# Patient Record
Sex: MALE | Race: WHITE | NOT HISPANIC OR LATINO | Employment: PART TIME | URBAN - METROPOLITAN AREA
[De-identification: names, ages, dates, MRNs, and addresses within clinical notes are randomized per-mention and may not be internally consistent; named-entity substitution may affect disease eponyms.]

---

## 2024-07-09 ENCOUNTER — LAB (OUTPATIENT)
Dept: LAB | Facility: CLINIC | Age: 49
End: 2024-07-09
Payer: COMMERCIAL

## 2024-07-09 ENCOUNTER — OFFICE VISIT (OUTPATIENT)
Dept: INTERNAL MEDICINE | Facility: CLINIC | Age: 49
End: 2024-07-09
Payer: COMMERCIAL

## 2024-07-09 VITALS
DIASTOLIC BLOOD PRESSURE: 85 MMHG | HEART RATE: 71 BPM | WEIGHT: 228.8 LBS | SYSTOLIC BLOOD PRESSURE: 124 MMHG | OXYGEN SATURATION: 98 %

## 2024-07-09 DIAGNOSIS — K57.32 DIVERTICULITIS OF COLON: ICD-10-CM

## 2024-07-09 DIAGNOSIS — K62.5 RECTAL BLEEDING: ICD-10-CM

## 2024-07-09 DIAGNOSIS — E78.5 HYPERLIPIDEMIA LDL GOAL <100: ICD-10-CM

## 2024-07-09 DIAGNOSIS — G57.81 NEUROPATHY OF RIGHT SURAL NERVE: ICD-10-CM

## 2024-07-09 DIAGNOSIS — Z11.4 SCREENING FOR HIV (HUMAN IMMUNODEFICIENCY VIRUS): ICD-10-CM

## 2024-07-09 DIAGNOSIS — L73.9 FOLLICULITIS: Primary | ICD-10-CM

## 2024-07-09 DIAGNOSIS — Z00.00 ENCOUNTER FOR HEALTH MAINTENANCE EXAMINATION IN ADULT: ICD-10-CM

## 2024-07-09 LAB
ANION GAP SERPL CALCULATED.3IONS-SCNC: 8 MMOL/L (ref 7–15)
BUN SERPL-MCNC: 11.9 MG/DL (ref 6–20)
CALCIUM SERPL-MCNC: 9.7 MG/DL (ref 8.6–10)
CHLORIDE SERPL-SCNC: 101 MMOL/L (ref 98–107)
CHOLEST SERPL-MCNC: 252 MG/DL
CREAT SERPL-MCNC: 0.93 MG/DL (ref 0.67–1.17)
CRP SERPL-MCNC: 7.72 MG/L
DEPRECATED HCO3 PLAS-SCNC: 29 MMOL/L (ref 22–29)
EGFRCR SERPLBLD CKD-EPI 2021: >90 ML/MIN/1.73M2
ERYTHROCYTE [DISTWIDTH] IN BLOOD BY AUTOMATED COUNT: 16.2 % (ref 10–15)
FASTING STATUS PATIENT QL REPORTED: YES
FASTING STATUS PATIENT QL REPORTED: YES
GLUCOSE SERPL-MCNC: 156 MG/DL (ref 70–99)
HCT VFR BLD AUTO: 52.4 % (ref 40–53)
HCV AB SERPL QL IA: NONREACTIVE
HDLC SERPL-MCNC: 42 MG/DL
HGB BLD-MCNC: 17 G/DL (ref 13.3–17.7)
HIV 1+2 AB+HIV1 P24 AG SERPL QL IA: NONREACTIVE
LDLC SERPL CALC-MCNC: 179 MG/DL
MCH RBC QN AUTO: 30.9 PG (ref 26.5–33)
MCHC RBC AUTO-ENTMCNC: 32.4 G/DL (ref 31.5–36.5)
MCV RBC AUTO: 95 FL (ref 78–100)
NONHDLC SERPL-MCNC: 210 MG/DL
PLATELET # BLD AUTO: 317 10E3/UL (ref 150–450)
POTASSIUM SERPL-SCNC: 5.5 MMOL/L (ref 3.4–5.3)
RBC # BLD AUTO: 5.5 10E6/UL (ref 4.4–5.9)
SODIUM SERPL-SCNC: 138 MMOL/L (ref 135–145)
TRIGL SERPL-MCNC: 157 MG/DL
TSH SERPL DL<=0.005 MIU/L-ACNC: 1.22 UIU/ML (ref 0.3–4.2)
WBC # BLD AUTO: 14.5 10E3/UL (ref 4–11)

## 2024-07-09 PROCEDURE — 85027 COMPLETE CBC AUTOMATED: CPT | Performed by: PATHOLOGY

## 2024-07-09 PROCEDURE — 90715 TDAP VACCINE 7 YRS/> IM: CPT

## 2024-07-09 PROCEDURE — 90480 ADMN SARSCOV2 VAC 1/ONLY CMP: CPT

## 2024-07-09 PROCEDURE — 90471 IMMUNIZATION ADMIN: CPT

## 2024-07-09 PROCEDURE — 84443 ASSAY THYROID STIM HORMONE: CPT | Performed by: PATHOLOGY

## 2024-07-09 PROCEDURE — 90746 HEPB VACCINE 3 DOSE ADULT IM: CPT

## 2024-07-09 PROCEDURE — 99204 OFFICE O/P NEW MOD 45 MIN: CPT | Mod: 25

## 2024-07-09 PROCEDURE — 87389 HIV-1 AG W/HIV-1&-2 AB AG IA: CPT | Performed by: INTERNAL MEDICINE

## 2024-07-09 PROCEDURE — 80048 BASIC METABOLIC PNL TOTAL CA: CPT | Performed by: PATHOLOGY

## 2024-07-09 PROCEDURE — 86803 HEPATITIS C AB TEST: CPT | Performed by: INTERNAL MEDICINE

## 2024-07-09 PROCEDURE — 80061 LIPID PANEL: CPT | Performed by: PATHOLOGY

## 2024-07-09 PROCEDURE — 91320 SARSCV2 VAC 30MCG TRS-SUC IM: CPT

## 2024-07-09 PROCEDURE — 99000 SPECIMEN HANDLING OFFICE-LAB: CPT | Performed by: PATHOLOGY

## 2024-07-09 PROCEDURE — 86140 C-REACTIVE PROTEIN: CPT | Performed by: PATHOLOGY

## 2024-07-09 PROCEDURE — 36415 COLL VENOUS BLD VENIPUNCTURE: CPT | Performed by: PATHOLOGY

## 2024-07-09 PROCEDURE — 90472 IMMUNIZATION ADMIN EACH ADD: CPT

## 2024-07-09 RX ORDER — KETOCONAZOLE 20 MG/ML
SHAMPOO TOPICAL DAILY PRN
Qty: 100 ML | Refills: 2 | Status: SHIPPED | OUTPATIENT
Start: 2024-07-09

## 2024-07-09 NOTE — PROGRESS NOTES
Assessment & Plan     Addendum:  Called pt back at 1055 following result of CBC with leukocytosis to 14.5, no diff was done so unclear if L shift. Slight hyperglycemia on labs to 157, but otherwise no indicators of acute infection on labs that have resulted thus far. Did add on a CRP to already drawn specimen but was unable to add on a Procalcitonin. Was able to speak to pt on phone and he is having no symptoms at this time. Given his hemodynamic stability in clinic today including normal HR and BP, lack of overt symptoms, and clinically benign exam, feel that it is appropriate to see patient in clinic next week at 0930. Through shared decision making he and I agreed to wait until next week for any appointment rather than going to the ED for further work up today.  However, I discussed with the pt that if he has a new fever, chills, acutely worsening abdominal pain or other new symptoms, he needs to go to the [facility] ED and have a repeat CT of his Abdomen done with c/f abscess vs other intraabdominal infection given recent h/o diverticulitis with some continued discomfort over the last few weeks. Otherwise, will see next week in clinic.    Jarod Quintero is a 47 yo male coming in to establish care and follow up from recent admission for first episode of diverticulitis. Additionally, discussed concerns for ~1 yr duration of R leg numbness and scalp pruritus. No acute concerns at this time. We addressed health maintenance to do list as below and pt is comfortable with completing ordered vaccines, labs, and referrals. Will see again in one month for general follow up.    (Z00.00) Encounter for medical examination to establish care  (primary encounter diagnosis)  Comment: Pt now with insurance and able to see a physician again. Notes several concerns as below, address HCM issues. Of note, currently is without a car but declined any resources or assistance with transportation.     (Z00.00) Encounter for health maintenance  examination in adult  Comment: Has not had basic labs drawn in several years. Agreeable to those ordered below, will see again in one month and can review together at this time. Will also receive vaccines as below.   Plan: CBC with platelets, Basic metabolic panel  (Ca,        Cl, CO2, Creat, Gluc, K, Na, BUN), TSH with         free T4 reflex, Lipid panel reflex to direct         LDL Non-fasting, Hepatitis C Screen Reflex to         HCV RNA Quant and Genotype, HEPATITIS B, ADULT         20+ (ENGERIX-B/RECOMBIVAX HB), TDAP 10-64Y         (ADACEL,BOOSTRIX), COVID-19 12+ (2023-24)         (PFIZER)    (Z11.4) Screening for HIV (human immunodeficiency virus)  Comment: Never done, pt agreeable to completing today. No known risk factors or exposures.   Plan: HIV Screening    (L73.9) Folliculitis  Comment: H/o of what sounds to be folliculitis of the scalp which has recurred. Ketoconazole has helped in the past and as OTC shampoos are not making a difference now, pt is requesting prescription for Ketoconazole shampoo again. Exam shows dry and flaking scalp c/w folliculitis vs dermatitis. No open wounds, erythema, etc c/f active infection.  Shampoo rx was sent and will check for improvement at follow up visit in one month.   Plan: ketoconazole (NIZORAL) 2 % external shampoo    (G57.81) Neuropathy of right sural nerve  Comment: Pt describes symptoms c/w sciatica vs R sural nerve neuropathy vs peroneal nerve as noted below. Given the distribution of sensory deficits on exam and his chronic low back pain described and paraspinal tenderness on exam. The numbness and burning pain, c/w neuropathy, are worse while he is working but alleviated by rest and not something he states he wants medications for at this time. Though he does cite some concerns for transportation for recurrent appointments, is very interested in PT referral to start strengthening and working on exercises to help with these symptoms. Referral therefore placed  "today. Do not feel that any imaging would change course of treatment at this time.   Follow up in one month on progression of symptoms.   Plan: Physical Therapy  Referral    (K62.5) Rectal bleeding  Comment: Describes intermittent hematochezia and BRBPR over the last few weeks. Was seen and admitted for his first episode of diverticulitis just prior to this (without perforation) and treated with Metronidazole and CTX with good relief of symptoms overall. Has never had a colonoscopy before and given that he is due for screening + severity of recent infection + intermittent BRBPR, agreed to go ahead with colonoscopy.  Follow up as able regarding this -- unsure if will be completed when I see him again in one month.   Plan: Adult GI  Referral - Procedure Only        Nicotine/Tobacco Cessation  He reports that he has been smoking cigarettes. He has never used smokeless tobacco.  Nicotine/Tobacco Cessation Plan  Information offered: Patient not interested at this time    Return in about 4 weeks (around 8/6/2024) for Follow up, with me.    Antonieta Blanco is a 48 year old, presenting for the following health issues:  Establish Care (Pt would like to establish care) and Numbness (Pt reports numbness on right knee that has persisted over the last year)    Pt prefers to go by \"Jarod.\"        7/9/2024     8:24 AM   Additional Questions   Roomed by BROOKE, EMT     ED follow up 5/01/2024  States that he was admitted for about three days and was told this was a \"very severe\" episode of diverticulitis given the severity of his symptoms and that he was \"close\" to \"having a rupture.\" Finished the CTX and Metronidazole. Has never had diverticulitis before. Was recommended to have a colonoscopy by the inpatient team and wanted to discuss this today, has never had one or a FIT test before.  Has seen bright red blood while wiping before and has seen it mixed into the stool as well. Has never had bety bleeding from " his rectum, no pain with bowel movements or rectal pain. Last week did see a less than tablespoons worth of blood while wiping and on his stool. Has also been having intermittent LLQ pain since the diverticulitis, but feels that this is improving. The pain is most noticeable before he has to have a BM. He feels that this discomfort is worse when he eats red meat.   No change in weight, no change in appetite. No night sweats, chills, fevers.   In terms of family history, states that his mother did have polyps removed but these were not cancerous. No family hx of colon cancer known otherwise.     R leg lateral numbness  Has noticed numbness across the lateral aspect of his R leg over the last year, just along the outside of his knee. The numbness is constant -- he has it right now. The numbness stays around the region of his knee, stopping mid-thigh superiorly and just below the knee inferiorly. 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 He is able to sit down and take breaks, but is overall fairly active at work. No radiation down to his toes. Does endorse lower back pain in the lumbar region.   No symptoms in the L leg.     Scalp concern  Was prescribed Ketoconazole in the past for scalp folliculitis. Has run out but notices that his scalp is very dry, flaking, and pruritic. This is patchy throughout his scalp. Has been using Head and Shoulders at home but feels that this is not helping. The ketoconazole helped the most.     ROS as per HPI.      Objective    /85 (BP Location: Right arm, Patient Position: Sitting, Cuff Size: Adult Regular)   Pulse 71   Wt 103.8 kg (228 lb 12.8 oz)   SpO2 98%   There is no height or weight on file to calculate BMI.  Physical Exam  Vitals and nursing note reviewed.   Constitutional:       Appearance: Normal appearance. He is obese. He is diaphoretic. He is not ill-appearing.       Comments: Smells of nicotine. Slightly diaphoretic.   HENT:      Head: Normocephalic.      Mouth/Throat:      Mouth: Mucous membranes are moist.   Eyes:      Extraocular Movements: Extraocular movements intact.      Conjunctiva/sclera: Conjunctivae normal.   Cardiovascular:      Rate and Rhythm: Normal rate and regular rhythm.      Pulses: Normal pulses.      Heart sounds: Normal heart sounds.   Pulmonary:      Effort: Pulmonary effort is normal. No respiratory distress.      Breath sounds: Normal breath sounds. No wheezing.   Abdominal:      General: Bowel sounds are normal. There is no distension.      Palpations: Abdomen is soft.      Tenderness: There is no abdominal tenderness. There is no guarding or rebound.      Comments: Obese   Musculoskeletal:         General: No swelling or tenderness. Normal range of motion.      Cervical back: Normal range of motion.      Right lower leg: No edema.      Left lower leg: No edema.   Skin:     General: Skin is warm.      Capillary Refill: Capillary refill takes 2 to 3 seconds.      Findings: No bruising, erythema or lesion.   Neurological:      Mental Status: He is oriented to person, place, and time. Mental status is at baseline.      Sensory: Sensory deficit (Sensory changes to temperature and pain along distribution of R sural nerve. Sensation intact to light touch. No deficits to other areas of leg, no deficits to L leg.) present.      Motor: No weakness.      Gait: Gait normal.      Deep Tendon Reflexes: Reflexes normal.        Nyla Pastor MD  Internal Medicine Resident, PGY1    Pt was seen and examined with Dr. Pastor.  I agree with his and her documentation as noted above.    My additional comments: None    Rhett Hidalgo MD

## 2024-07-12 ENCOUNTER — HOSPITAL ENCOUNTER (OUTPATIENT)
Facility: CLINIC | Age: 49
Setting detail: OBSERVATION
Discharge: HOME | End: 2024-07-13
Attending: EMERGENCY MEDICINE | Admitting: STUDENT IN AN ORGANIZED HEALTH CARE EDUCATION/TRAINING PROGRAM
Payer: COMMERCIAL

## 2024-07-12 ENCOUNTER — APPOINTMENT (OUTPATIENT)
Dept: CT IMAGING | Facility: CLINIC | Age: 49
End: 2024-07-12
Attending: EMERGENCY MEDICINE
Payer: COMMERCIAL

## 2024-07-12 DIAGNOSIS — R10.32 LEFT LOWER QUADRANT ABDOMINAL PAIN: ICD-10-CM

## 2024-07-12 DIAGNOSIS — K57.32 DIVERTICULITIS OF COLON: Primary | ICD-10-CM

## 2024-07-12 DIAGNOSIS — K52.9 ACUTE COLITIS: ICD-10-CM

## 2024-07-12 LAB
ALBUMIN SERPL BCG-MCNC: 4.2 G/DL (ref 3.5–5.2)
ALP SERPL-CCNC: 116 U/L (ref 40–150)
ALT SERPL W P-5'-P-CCNC: 11 U/L (ref 0–70)
ANION GAP SERPL CALCULATED.3IONS-SCNC: 9 MMOL/L (ref 7–15)
AST SERPL W P-5'-P-CCNC: 16 U/L (ref 0–45)
BASOPHILS # BLD AUTO: 0.1 10E3/UL (ref 0–0.2)
BASOPHILS NFR BLD AUTO: 1 %
BILIRUB SERPL-MCNC: 0.5 MG/DL
BUN SERPL-MCNC: 10.4 MG/DL (ref 6–20)
CALCIUM SERPL-MCNC: 9 MG/DL (ref 8.6–10)
CHLORIDE SERPL-SCNC: 102 MMOL/L (ref 98–107)
CREAT SERPL-MCNC: 0.84 MG/DL (ref 0.67–1.17)
CRP SERPL-MCNC: 7.76 MG/L
DEPRECATED HCO3 PLAS-SCNC: 27 MMOL/L (ref 22–29)
EGFRCR SERPLBLD CKD-EPI 2021: >90 ML/MIN/1.73M2
EOSINOPHIL # BLD AUTO: 0.6 10E3/UL (ref 0–0.7)
EOSINOPHIL NFR BLD AUTO: 6 %
ERYTHROCYTE [DISTWIDTH] IN BLOOD BY AUTOMATED COUNT: 15.9 % (ref 10–15)
GLUCOSE SERPL-MCNC: 110 MG/DL (ref 70–99)
HCT VFR BLD AUTO: 50.7 % (ref 40–53)
HGB BLD-MCNC: 16.3 G/DL (ref 13.3–17.7)
IMM GRANULOCYTES # BLD: 0 10E3/UL
IMM GRANULOCYTES NFR BLD: 0 %
LACTATE SERPL-SCNC: 1.5 MMOL/L (ref 0.7–2)
LYMPHOCYTES # BLD AUTO: 1.9 10E3/UL (ref 0.8–5.3)
LYMPHOCYTES NFR BLD AUTO: 22 %
MCH RBC QN AUTO: 30.9 PG (ref 26.5–33)
MCHC RBC AUTO-ENTMCNC: 32.1 G/DL (ref 31.5–36.5)
MCV RBC AUTO: 96 FL (ref 78–100)
MONOCYTES # BLD AUTO: 0.8 10E3/UL (ref 0–1.3)
MONOCYTES NFR BLD AUTO: 9 %
NEUTROPHILS # BLD AUTO: 5.4 10E3/UL (ref 1.6–8.3)
NEUTROPHILS NFR BLD AUTO: 62 %
NRBC # BLD AUTO: 0 10E3/UL
NRBC BLD AUTO-RTO: 0 /100
PLATELET # BLD AUTO: 319 10E3/UL (ref 150–450)
POTASSIUM SERPL-SCNC: 4.9 MMOL/L (ref 3.4–5.3)
PROCALCITONIN SERPL IA-MCNC: 0.05 NG/ML
PROT SERPL-MCNC: 7.4 G/DL (ref 6.4–8.3)
RADIOLOGIST FLAGS: NORMAL
RBC # BLD AUTO: 5.27 10E6/UL (ref 4.4–5.9)
SODIUM SERPL-SCNC: 138 MMOL/L (ref 135–145)
WBC # BLD AUTO: 8.8 10E3/UL (ref 4–11)

## 2024-07-12 PROCEDURE — 85025 COMPLETE CBC W/AUTO DIFF WBC: CPT | Performed by: EMERGENCY MEDICINE

## 2024-07-12 PROCEDURE — 74177 CT ABD & PELVIS W/CONTRAST: CPT

## 2024-07-12 PROCEDURE — 96374 THER/PROPH/DIAG INJ IV PUSH: CPT | Mod: 59

## 2024-07-12 PROCEDURE — 83605 ASSAY OF LACTIC ACID: CPT | Performed by: EMERGENCY MEDICINE

## 2024-07-12 PROCEDURE — 250N000011 HC RX IP 250 OP 636: Performed by: EMERGENCY MEDICINE

## 2024-07-12 PROCEDURE — G0378 HOSPITAL OBSERVATION PER HR: HCPCS

## 2024-07-12 PROCEDURE — 80053 COMPREHEN METABOLIC PANEL: CPT | Performed by: EMERGENCY MEDICINE

## 2024-07-12 PROCEDURE — 36415 COLL VENOUS BLD VENIPUNCTURE: CPT | Performed by: EMERGENCY MEDICINE

## 2024-07-12 PROCEDURE — 74177 CT ABD & PELVIS W/CONTRAST: CPT | Mod: 26 | Performed by: STUDENT IN AN ORGANIZED HEALTH CARE EDUCATION/TRAINING PROGRAM

## 2024-07-12 PROCEDURE — 96375 TX/PRO/DX INJ NEW DRUG ADDON: CPT

## 2024-07-12 PROCEDURE — 84145 PROCALCITONIN (PCT): CPT | Performed by: EMERGENCY MEDICINE

## 2024-07-12 PROCEDURE — 99285 EMERGENCY DEPT VISIT HI MDM: CPT | Performed by: EMERGENCY MEDICINE

## 2024-07-12 PROCEDURE — 86140 C-REACTIVE PROTEIN: CPT | Performed by: EMERGENCY MEDICINE

## 2024-07-12 PROCEDURE — 99285 EMERGENCY DEPT VISIT HI MDM: CPT | Mod: 25 | Performed by: EMERGENCY MEDICINE

## 2024-07-12 PROCEDURE — 99207 PR APP CREDIT; MD BILLING SHARED VISIT: CPT

## 2024-07-12 PROCEDURE — 99222 1ST HOSP IP/OBS MODERATE 55: CPT | Mod: FS | Performed by: STUDENT IN AN ORGANIZED HEALTH CARE EDUCATION/TRAINING PROGRAM

## 2024-07-12 RX ORDER — METRONIDAZOLE 500 MG/1
500 TABLET ORAL EVERY 8 HOURS
Status: DISCONTINUED | OUTPATIENT
Start: 2024-07-13 | End: 2024-07-13

## 2024-07-12 RX ORDER — CEFTRIAXONE 2 G/1
2 INJECTION, POWDER, FOR SOLUTION INTRAMUSCULAR; INTRAVENOUS ONCE
Status: COMPLETED | OUTPATIENT
Start: 2024-07-12 | End: 2024-07-12

## 2024-07-12 RX ORDER — ACETAMINOPHEN 650 MG/1
650 SUPPOSITORY RECTAL EVERY 4 HOURS PRN
Status: DISCONTINUED | OUTPATIENT
Start: 2024-07-12 | End: 2024-07-13 | Stop reason: HOSPADM

## 2024-07-12 RX ORDER — METRONIDAZOLE 500 MG/100ML
500 INJECTION, SOLUTION INTRAVENOUS ONCE
Status: COMPLETED | OUTPATIENT
Start: 2024-07-12 | End: 2024-07-12

## 2024-07-12 RX ORDER — IOPAMIDOL 755 MG/ML
135 INJECTION, SOLUTION INTRAVASCULAR ONCE
Status: COMPLETED | OUTPATIENT
Start: 2024-07-12 | End: 2024-07-12

## 2024-07-12 RX ORDER — CEFTRIAXONE 2 G/1
2 INJECTION, POWDER, FOR SOLUTION INTRAMUSCULAR; INTRAVENOUS EVERY 24 HOURS
Status: DISCONTINUED | OUTPATIENT
Start: 2024-07-13 | End: 2024-07-13

## 2024-07-12 RX ORDER — ACETAMINOPHEN 325 MG/1
650 TABLET ORAL EVERY 6 HOURS PRN
Status: DISCONTINUED | OUTPATIENT
Start: 2024-07-12 | End: 2024-07-13 | Stop reason: HOSPADM

## 2024-07-12 RX ADMIN — CEFTRIAXONE SODIUM 2 G: 2 INJECTION, POWDER, FOR SOLUTION INTRAMUSCULAR; INTRAVENOUS at 19:06

## 2024-07-12 RX ADMIN — METRONIDAZOLE 500 MG: 500 INJECTION, SOLUTION INTRAVENOUS at 21:32

## 2024-07-12 RX ADMIN — IOPAMIDOL 135 ML: 755 INJECTION, SOLUTION INTRAVENOUS at 14:39

## 2024-07-12 ASSESSMENT — ACTIVITIES OF DAILY LIVING (ADL)
ADLS_ACUITY_SCORE: 31
ADLS_ACUITY_SCORE: 35
ADLS_ACUITY_SCORE: 31
ADLS_ACUITY_SCORE: 35
ADLS_ACUITY_SCORE: 31
ADLS_ACUITY_SCORE: 35

## 2024-07-12 ASSESSMENT — COLUMBIA-SUICIDE SEVERITY RATING SCALE - C-SSRS
1. IN THE PAST MONTH, HAVE YOU WISHED YOU WERE DEAD OR WISHED YOU COULD GO TO SLEEP AND NOT WAKE UP?: NO
6. HAVE YOU EVER DONE ANYTHING, STARTED TO DO ANYTHING, OR PREPARED TO DO ANYTHING TO END YOUR LIFE?: NO
2. HAVE YOU ACTUALLY HAD ANY THOUGHTS OF KILLING YOURSELF IN THE PAST MONTH?: NO

## 2024-07-12 NOTE — H&P
[facility]    History and Physical - Hospitalist Service       Date of Admission:  7/12/2024    Assessment & Plan      Bella Quintero is a 48 year old male admitted on 7/12/2024. He has PMH of HLD, diverticulitis with perforation and peridiverticular phlegmon requiring admission in 5/2024. He was managed with CTX, Metronidazole and NPO diet with good response at that time. He was not able to complete outpatient colonoscopy after discharge.   He was seen by new PCP on 7/9 and complained of intermittent hematochezia for the last several weeks. Labs concerning at that time for leukocystosis of 14.5 and elevated CRP, though was asymptomatic and hemodynamically stable. Last night, he started having LLQ abdominal pain with BMs and slight pain sometimes where laying down similar to during previous diverticulitis episode which prompted ED presentation. Today labs are improved and CT with no sign of perforation, though does support diverticulitis/colitis.    Acute LLQ Abdominal Pain  Diverticulitis/Colitis  Hematochezia  Pt presents after abnormal labs a few days ago with worsening of LLQ pain last night, similar to previous diverticulitis episode from 5/24 which was associated with perforation.  He required admission at that time for IV antibiotics and improved.  Had not been able to obtain outpatient colonoscopy up to this point. CT with no sign of perforation, though does support diverticulitis/colitis with possible phlegmon. Abdominal exam reveals mild TTP in LUQ and LLQ, though no indication of acute abdomen.  - Given one dose of ceftriaxone and metronidazole in the ED, will continue   - Could consider consult to colorectal surgery vs GI in the AM  - Full liquid diet  - Hemoglobin currently stable, repeat in the morning     Nicotine Dependence, 1/2 ppd  No need for NRT at this time      Diet: NPO per Anesthesia Guidelines for Procedure/Surgery Except for: Meds    DVT  Prophylaxis: Low Risk/Ambulatory with no VTE prophylaxis indicated  Warren Catheter: Not present  Lines: None     Cardiac Monitoring: None  Code Status:  Full Code    Clinically Significant Risk Factors Present on Admission                                         Disposition Plan     Medically Ready for Discharge: Anticipated Tomorrow         The patient's care was discussed with the Patient.    Riana Quigley PA-C  Hospitalist Service  [facility]  Securely message with [employer] (more info)  Text page via [facility] Paging/Directory     ______________________________________________________________________    Chief Complaint   Abdominal pain    History is obtained from the patient    History of Present Illness   Bella Quintero is a 48 year old male who has PMH of HLD, diverticulitis with perforation and peridiverticular phlegmon requiring admission in 5/2024. He was managed with CTX, Metronidazole and NPO diet with good response at that time. He was not able to complete outpatient colonoscopy after discharge.     He reports having intermittent hematochezia since he was admitted with diverticulitis, would also have intermittent mild LLQ pain.  Has not felt quite back to normal.  He was seen by new PCP on 7/9 and complained of intermittent hematochezia for the last several weeks. Labs concerning at that time for leukocystosis of 14.5 and elevated CRP, though was asymptomatic and hemodynamically stable.     Last night, he started having LLQ abdominal pain with BMs and slight pain sometimes where laying down similar to during previous diverticulitis episode which prompted ED presentation. Today labs are improved and CT with no sign of perforation, though does support diverticulitis/colitis.    Denies N/V, reports constipation which he has not taken anything for at home.  Has been trying to do more of a liquid diet over the last several days to prevent any flaring, though pain  over the last 24 hours has increased.  He has pain 3-6 out of 10 in the LLQ mostly when he has a bowel movement, though he did notice that last night when he would turn in bed.  Denies fever/chills.    Past Medical History    Past Medical History:   Diagnosis Date    Bilateral low back pain with right-sided sciatica     Diverticulitis of colon 05/01/2024    History of alcohol use disorder     Tobacco use disorder        Past Surgical History   No past surgical history on file.    Prior to Admission Medications   Prior to Admission Medications   Prescriptions Last Dose Informant Patient Reported? Taking?   ketoconazole (NIZORAL) 2 % external shampoo   No No   Sig: Apply topically daily as needed for itching or irritation      Facility-Administered Medications: None        Review of Systems    The 10 point Review of Systems is negative other than noted in the HPI or here.      Physical Exam   Vital Signs: Temp: 98  F (36.7  C) Temp src: Oral BP: 125/70 Pulse: 75   Resp: 20 SpO2: 97 % O2 Device: None (Room air)    Weight: 0 lbs 0 oz    General Appearance: Pleasant, alert, just got to his room in obs  Respiratory: CTAB, no respiratory distress 8  Cardiovascular: RRR, no murmurs  GI: Soft, nondistended abdomen, mild TTP over RUQ, LUQ, LLQ, nonradiating.  No guarding or rebound tenderness    Medical Decision Making       75 MINUTES SPENT BY ME on the date of service doing chart review, history, exam, documentation & further activities per the note.      Data     I have personally reviewed the following data over the past 24 hrs:    8.8  \   16.3   / 319     138 102 10.4 /  110 (H)   4.9 27 0.84 \     ALT: 11 AST: 16 AP: 116 TBILI: 0.5   ALB: 4.2 TOT PROTEIN: 7.4 LIPASE: N/A     Procal: 0.05 CRP: 7.76 (H) Lactic Acid: 1.5         Imaging results reviewed over the past 24 hrs:   Recent Results (from the past 24 hour(s))   CT Abdomen Pelvis w Contrast   Result Value    Radiologist flags Colonoscopy post treatment     Narrative    EXAMINATION: CT ABDOMEN PELVIS W CONTRAST, 7/12/2024 3:04 PM    INDICATION: h/o ruptured diverticulitis w/persistent symptoms and  elevated WBC, r/o intraabdominal abscess    COMPARISON: None.    TECHNIQUE: CT scan of the abdomen and pelvis was performed on  multidetector CT scanner using volumetric acquisition technique and  images were reconstructed in multiple planes with variable thickness  and reviewed on dedicated workstations.     CONTRAST: Isovue 370.    FINDINGS:    Lower thorax: No effusion or consolidation.    Liver: No focal liver lesion. No intrahepatic biliary ductal dilation.       Biliary System: No calcified gallstone. No extrahepatic biliary ductal  dilation.    Pancreas: Within normal limits. No pancreatic ductal dilation.    Spleen: Borderline splenomegaly.    Adrenal glands: No nodule.    Kidneys: No obstructing calculus or hydronephrosis. Too small to  characterize left medial renal cortical hypodensity (6/77).    Pelvis: Urinary bladder is within normal limits.    Gastrointestinal tract: Mural thickening, eccentric appearing with  luminal narrowing and inner wall hyperenhancement, mild pericolonic  streakiness involving about 10 cm segment of sigmoid colon (4/357 and  6/57); ill marginated confluent density is seen along the right  lateral thickened sigmoid colon extending along lateral pelvic fascia  with adjacent fatty streakiness. No drainable fluid collection. Mild  thickening of the sigmoid mesocolon. Normal caliber small and large  bowel. Normal appendix.    Mesentery/peritoneum/retroperitoneum: No free air or fluid.    Lymph nodes: Prominent portacaval and shraddha hepatis lymph nodes,  measuring up to 1.2 and 0.9 cm (4/109, 4/123). Prominent pelvic lymph  node (4/273), possibly reactive.    Vasculature: Scattered atherosclerotic calcification of abdominal  aorta with no aneurysmal dilation.      Bones and soft tissues: Mild degenerative changes of the lumbosacral  spine.  Lucency seen along the anterior root of presumed L2 vertebral  body measuring about 12 mm (7/72), indeterminate.Small umbilical  hernia containing a portion of small bowel wall without evidence of  obstruction.        Impression    IMPRESSION:    1. Eccentric appearing mural thickening with luminal narrowing, in  metabolic enhancement and pericolonic streakiness involving 10 cm  segment of sigmoid colon, concerning for possible  colitis/diverticulitis with few adjacent prominent mesocolic nodes.  Recommend posttreatment colonoscopy to rule out underlying neoplastic  lesion  2. Confluent thickening right lateral to the thickened sigmoid colon  along the lateral pelvic fascia possibly phlegmon; no drainable fluid  collection.  3. Few prominent retroperitoneal lymph nodes, as above, consider  attention on follow-up.  4. Lucency along the anterosuperior L2 vertebral body, indeterminate,  possible demineralization recommend correlation with prior imaging if  available and/or attention on follow-up as clinically desired.  5. Borderline splenomegaly.      [Recommend Follow Up: Colonoscopy post treatment]    This report will be copied to the [facility] to ensure a  provider acknowledges the finding. [facility] Center is available Monday  through Friday 8am-3:30 pm.    I have personally reviewed the examination and initial interpretation  and I agree with the findings.    ANDI KISER MD         SYSTEM ID:  [id number]

## 2024-07-12 NOTE — ED PROVIDER NOTES
[place] EMERGENCY DEPARTMENT ([facility])    7/12/24       ED PROVIDER NOTE        History     Chief Complaint   Patient presents with    Abnormal Labs     HPI  Bella Quintero is a 48 year old male with a past medical history significant for perforated diverticulitis and HLD who presents to the Emergency Department for evaluation of abnormal labs and abdominal pain. Patient states he was admitted in May for three days for diverticulitis rupture and was discharged with antibiotics. He reports feeling fine since then and recently followed up on 7/9 to a PCP to establish care. He had labs drawn which were significant for leukocytosis to 14.5. Patient states his doctor called him about this and he had no symptoms at the time. He was told to return for another appointment next week for CT of abdomen and to present to ED if he has worsening symptoms. He states yesterday he began noticing 6/10 lower left abdominal pain with bowel movements which was similar to his pain when he had his diverticulitis. He only feels this pain with bowel movements and sometimes slightly when laying down. He denies blood in his stool. No recent nausea and vomiting. He believes he had a recent fever but did not measure this. He has not taken anything for the pain. No abdominal surgeries in the past. He reports feeling slightly bloated as well.       Past Medical History  Past Medical History:   Diagnosis Date    Bilateral low back pain with right-sided sciatica     Diverticulitis of colon 05/01/2024    History of alcohol use disorder     Tobacco use disorder      No past surgical history on file.  No current outpatient medications on file.    Allergies   Allergen Reactions    Codeine Swelling and Itching     PN: LW Reaction: EDEMA, GENERALIZED   Tolerated hydromorphone 5/1/24    Penicillins Other (See Comments)     Swelling, burning feeling in hands and feet. , PN: LW Reaction: EDEMA, GENERALIZED     Family History  Family  History   Problem Relation Age of Onset    Colon Polyps Mother     Alcoholism Father     Diabetes Father     Colon Cancer No family hx of      Social History   Social History     Tobacco Use    Smoking status: Every Day     Current packs/day: 1.00     Average packs/day: 1 pack/day for 30.0 years (30.0 ttl pk-yrs)     Types: Cigarettes     Start date: 7/9/1994    Smokeless tobacco: Never    Tobacco comments:     Revisit as pt is willing   Substance Use Topics    Alcohol use: Yes     Alcohol/week: 15.0 standard drinks of alcohol     Types: 15 Glasses of wine per week     Comment: 3 bottles of wine per week. H/o 1L EtOH every other day, self decreased several years ago.    Drug use: Yes     Types: Marijuana      Past medical history, past surgical history, medications, allergies, family history, and social history were reviewed with the patient. No additional pertinent items.     A medically appropriate review of systems was performed with pertinent positives and negatives noted in the HPI, and all other systems negative.    Physical Exam   BP: 125/70  Pulse: 75  Temp: 98  F (36.7  C)  Resp: 20  SpO2: 97 %  Physical Exam  Vitals and nursing note reviewed.   Constitutional:       General: He is not in acute distress.     Appearance: Normal appearance. He is not toxic-appearing.   HENT:      Head: Atraumatic.   Eyes:      General: No scleral icterus.     Conjunctiva/sclera: Conjunctivae normal.   Cardiovascular:      Rate and Rhythm: Normal rate and regular rhythm.      Heart sounds: Normal heart sounds.   Pulmonary:      Effort: Pulmonary effort is normal. No respiratory distress.      Breath sounds: Normal breath sounds.   Abdominal:      General: Bowel sounds are normal. There is no distension.      Palpations: Abdomen is soft.      Tenderness: There is abdominal tenderness in the left lower quadrant. There is no guarding or rebound.      Comments: Mild LLQ tenderness.   Musculoskeletal:         General: No deformity.       Cervical back: Neck supple.   Skin:     General: Skin is warm.   Neurological:      Mental Status: He is alert.         ED Course, Procedures, & Data      Procedures            Results for orders placed or performed during the hospital encounter of 07/12/24   CT Abdomen Pelvis w Contrast     Status: None   Result Value Ref Range    Radiologist flags Colonoscopy post treatment     Narrative    EXAMINATION: CT ABDOMEN PELVIS W CONTRAST, 7/12/2024 3:04 PM    INDICATION: h/o ruptured diverticulitis w/persistent symptoms and  elevated WBC, r/o intraabdominal abscess    COMPARISON: None.    TECHNIQUE: CT scan of the abdomen and pelvis was performed on  multidetector CT scanner using volumetric acquisition technique and  images were reconstructed in multiple planes with variable thickness  and reviewed on dedicated workstations.     CONTRAST: Isovue 370.    FINDINGS:    Lower thorax: No effusion or consolidation.    Liver: No focal liver lesion. No intrahepatic biliary ductal dilation.       Biliary System: No calcified gallstone. No extrahepatic biliary ductal  dilation.    Pancreas: Within normal limits. No pancreatic ductal dilation.    Spleen: Borderline splenomegaly.    Adrenal glands: No nodule.    Kidneys: No obstructing calculus or hydronephrosis. Too small to  characterize left medial renal cortical hypodensity (6/77).    Pelvis: Urinary bladder is within normal limits.    Gastrointestinal tract: Mural thickening, eccentric appearing with  luminal narrowing and inner wall hyperenhancement, mild pericolonic  streakiness involving about 10 cm segment of sigmoid colon (4/357 and  6/57); ill marginated confluent density is seen along the right  lateral thickened sigmoid colon extending along lateral pelvic fascia  with adjacent fatty streakiness. No drainable fluid collection. Mild  thickening of the sigmoid mesocolon. Normal caliber small and large  bowel. Normal  appendix.    Mesentery/peritoneum/retroperitoneum: No free air or fluid.    Lymph nodes: Prominent portacaval and shraddha hepatis lymph nodes,  measuring up to 1.2 and 0.9 cm (4/109, 4/123). Prominent pelvic lymph  node (4/273), possibly reactive.    Vasculature: Scattered atherosclerotic calcification of abdominal  aorta with no aneurysmal dilation.      Bones and soft tissues: Mild degenerative changes of the lumbosacral  spine. Lucency seen along the anterior root of presumed L2 vertebral  body measuring about 12 mm (7/72), indeterminate.Small umbilical  hernia containing a portion of small bowel wall without evidence of  obstruction.        Impression    IMPRESSION:    1. Eccentric appearing mural thickening with luminal narrowing, in  metabolic enhancement and pericolonic streakiness involving 10 cm  segment of sigmoid colon, concerning for possible  colitis/diverticulitis with few adjacent prominent mesocolic nodes.  Recommend posttreatment colonoscopy to rule out underlying neoplastic  lesion  2. Confluent thickening right lateral to the thickened sigmoid colon  along the lateral pelvic fascia possibly phlegmon; no drainable fluid  collection.  3. Few prominent retroperitoneal lymph nodes, as above, consider  attention on follow-up.  4. Lucency along the anterosuperior L2 vertebral body, indeterminate,  possible demineralization recommend correlation with prior imaging if  available and/or attention on follow-up as clinically desired.  5. Borderline splenomegaly.      [Recommend Follow Up: Colonoscopy post treatment]    This report will be copied to the [facility] to ensure a  provider acknowledges the finding. Access Center is available Monday  through Friday 8am-3:30 pm.    I have personally reviewed the examination and initial interpretation  and I agree with the findings.    ANDI KISER MD         SYSTEM ID:  [id number]   Comprehensive metabolic panel     Status: Abnormal   Result Value Ref  Range    Sodium 138 135 - 145 mmol/L    Potassium 4.9 3.4 - 5.3 mmol/L    Carbon Dioxide (CO2) 27 22 - 29 mmol/L    Anion Gap 9 7 - 15 mmol/L    Urea Nitrogen 10.4 6.0 - 20.0 mg/dL    Creatinine 0.84 0.67 - 1.17 mg/dL    GFR Estimate >90 >60 mL/min/1.73m2    Calcium 9.0 8.6 - 10.0 mg/dL    Chloride 102 98 - 107 mmol/L    Glucose 110 (H) 70 - 99 mg/dL    Alkaline Phosphatase 116 40 - 150 U/L    AST 16 0 - 45 U/L    ALT 11 0 - 70 U/L    Protein Total 7.4 6.4 - 8.3 g/dL    Albumin 4.2 3.5 - 5.2 g/dL    Bilirubin Total 0.5 <=1.2 mg/dL   Lactic acid whole blood with 1x repeat in 2 hr when >2     Status: Normal   Result Value Ref Range    Lactic Acid, Initial 1.5 0.7 - 2.0 mmol/L   Procalcitonin     Status: Normal   Result Value Ref Range    Procalcitonin 0.05 <0.50 ng/mL   CRP inflammation     Status: Abnormal   Result Value Ref Range    CRP Inflammation 7.76 (H) <5.00 mg/L   CBC with platelets and differential     Status: Abnormal   Result Value Ref Range    WBC Count 8.8 4.0 - 11.0 10e3/uL    RBC Count 5.27 4.40 - 5.90 10e6/uL    Hemoglobin 16.3 13.3 - 17.7 g/dL    Hematocrit 50.7 40.0 - 53.0 %    MCV 96 78 - 100 fL    MCH 30.9 26.5 - 33.0 pg    MCHC 32.1 31.5 - 36.5 g/dL    RDW 15.9 (H) 10.0 - 15.0 %    Platelet Count 319 150 - 450 10e3/uL    % Neutrophils 62 %    % Lymphocytes 22 %    % Monocytes 9 %    % Eosinophils 6 %    % Basophils 1 %    % Immature Granulocytes 0 %    NRBCs per 100 WBC 0 <1 /100    Absolute Neutrophils 5.4 1.6 - 8.3 10e3/uL    Absolute Lymphocytes 1.9 0.8 - 5.3 10e3/uL    Absolute Monocytes 0.8 0.0 - 1.3 10e3/uL    Absolute Eosinophils 0.6 0.0 - 0.7 10e3/uL    Absolute Basophils 0.1 0.0 - 0.2 10e3/uL    Absolute Immature Granulocytes 0.0 <=0.4 10e3/uL    Absolute NRBCs 0.0 10e3/uL   Basic metabolic panel     Status: Abnormal   Result Value Ref Range    Sodium 137 135 - 145 mmol/L    Potassium 4.2 3.4 - 5.3 mmol/L    Chloride 102 98 - 107 mmol/L    Carbon Dioxide (CO2) 26 22 - 29 mmol/L    Anion  Gap 9 7 - 15 mmol/L    Urea Nitrogen 9.6 6.0 - 20.0 mg/dL    Creatinine 0.78 0.67 - 1.17 mg/dL    GFR Estimate >90 >60 mL/min/1.73m2    Calcium 8.7 8.6 - 10.0 mg/dL    Glucose 111 (H) 70 - 99 mg/dL   CBC with platelets     Status: Abnormal   Result Value Ref Range    WBC Count 7.3 4.0 - 11.0 10e3/uL    RBC Count 4.93 4.40 - 5.90 10e6/uL    Hemoglobin 15.4 13.3 - 17.7 g/dL    Hematocrit 46.0 40.0 - 53.0 %    MCV 93 78 - 100 fL    MCH 31.2 26.5 - 33.0 pg    MCHC 33.5 31.5 - 36.5 g/dL    RDW 16.0 (H) 10.0 - 15.0 %    Platelet Count 275 150 - 450 10e3/uL   CBC with platelets differential     Status: Abnormal    Narrative    The following orders were created for panel order CBC with platelets differential.  Procedure                               Abnormality         Status                     ---------                               -----------         ------                     CBC with platelets and d...[[id number]]  Abnormal            Final result                 Please view results for these tests on the individual orders.     Medications   acetaminophen (TYLENOL) tablet 650 mg (has no administration in time range)     Or   acetaminophen (TYLENOL) Suppository 650 mg (has no administration in time range)   melatonin tablet 5 mg (has no administration in time range)   sodium chloride (PF) 0.9% PF flush 84 mL (84 mLs Intravenous $Given 7/12/24 1439)   iopamidol (ISOVUE-370) solution 135 mL (135 mLs Intravenous $Given 7/12/24 1439)   cefTRIAXone (ROCEPHIN) 2 g vial to attach to  ml bag for ADULTS or NS 50 ml bag for PEDS (2 g Intravenous $New Bag 7/12/24 1906)   metroNIDAZOLE (FLAGYL) infusion 500 mg (500 mg Intravenous $New Bag 7/12/24 2132)     Labs Ordered and Resulted from Time of ED Arrival to Time of ED Departure   COMPREHENSIVE METABOLIC PANEL - Abnormal       Result Value    Sodium 138      Potassium 4.9      Carbon Dioxide (CO2) 27      Anion Gap 9      Urea Nitrogen 10.4      Creatinine 0.84      GFR  Estimate >90      Calcium 9.0      Chloride 102      Glucose 110 (*)     Alkaline Phosphatase 116      AST 16      ALT 11      Protein Total 7.4      Albumin 4.2      Bilirubin Total 0.5     CRP INFLAMMATION - Abnormal    CRP Inflammation 7.76 (*)    CBC WITH PLATELETS AND DIFFERENTIAL - Abnormal    WBC Count 8.8      RBC Count 5.27      Hemoglobin 16.3      Hematocrit 50.7      MCV 96      MCH 30.9      MCHC 32.1      RDW 15.9 (*)     Platelet Count 319      % Neutrophils 62      % Lymphocytes 22      % Monocytes 9      % Eosinophils 6      % Basophils 1      % Immature Granulocytes 0      NRBCs per 100 WBC 0      Absolute Neutrophils 5.4      Absolute Lymphocytes 1.9      Absolute Monocytes 0.8      Absolute Eosinophils 0.6      Absolute Basophils 0.1      Absolute Immature Granulocytes 0.0      Absolute NRBCs 0.0     LACTIC ACID WHOLE BLOOD WITH 1X REPEAT IN 2 HR WHEN >2 - Normal    Lactic Acid, Initial 1.5     PROCALCITONIN - Normal    Procalcitonin 0.05       CT Abdomen Pelvis w Contrast   Final Result   IMPRESSION:      1. Eccentric appearing mural thickening with luminal narrowing, in   metabolic enhancement and pericolonic streakiness involving 10 cm   segment of sigmoid colon, concerning for possible   colitis/diverticulitis with few adjacent prominent mesocolic nodes.   Recommend posttreatment colonoscopy to rule out underlying neoplastic   lesion   2. Confluent thickening right lateral to the thickened sigmoid colon   along the lateral pelvic fascia possibly phlegmon; no drainable fluid   collection.   3. Few prominent retroperitoneal lymph nodes, as above, consider   attention on follow-up.   4. Lucency along the anterosuperior L2 vertebral body, indeterminate,   possible demineralization recommend correlation with prior imaging if   available and/or attention on follow-up as clinically desired.   5. Borderline splenomegaly.         [Recommend Follow Up: Colonoscopy post treatment]      This report will  be copied to the [place] to ensure a   provider acknowledges the finding. Access Center is available Monday   through Friday 8am-3:30 pm.      I have personally reviewed the examination and initial interpretation   and I agree with the findings.      ANDI KISER MD            SYSTEM ID:  [id number]             Critical care was not performed.     Medical Decision Making  The patient's presentation was of moderate complexity (an acute complicated injury).    The patient's evaluation involved:  ordering and/or review of 3+ test(s) in this encounter (see separate area of note for details)    The patient's management necessitated high risk (a decision regarding hospitalization).    Assessment & Plan    49 yo male here with LLQ abd pain s/p ruptured diverticulitis 6 wks prior treated conservatively with abx. He now has pain with bowel movements as well. Labs and CT ordered per recommendation of primary and exam and history.    Labs show CRP elevated at 7.76, otherwise unremarkable with normal WBC and lactate.  CT abd/pelvis shows:  1. Eccentric appearing mural thickening with luminal narrowing, in  metabolic enhancement and pericolonic streakiness involving 10 cm  segment of sigmoid colon, concerning for possible  colitis/diverticulitis with few adjacent prominent mesocolic nodes.  Recommend posttreatment colonoscopy to rule out underlying neoplastic  lesion  2. Confluent thickening right lateral to the thickened sigmoid colon  along the lateral pelvic fascia possibly phlegmon; no drainable fluid  collection.  3. Few prominent retroperitoneal lymph nodes, as above, consider  attention on follow-up.  4. Lucency along the anterosuperior L2 vertebral body, indeterminate,  possible demineralization recommend correlation with prior imaging if  available and/or attention on follow-up as clinically desired.  5. Borderline splenomegaly.    He will be admitted to medicine on observation for treatment of his  colitis and will likely be discharged with outpatient follow up for colonoscopy.    I have reviewed the nursing notes. I have reviewed the findings, diagnosis, plan and need for follow up with the patient.    Current Discharge Medication List          Final diagnoses:   Acute colitis   Left lower quadrant abdominal pain   IRIP, am serving as a trained medical scribe to document services personally performed by Jose Kaiser MD, based on the provider's statements to me.      I, Jose Kaiser MD, was physically present and have reviewed and verified the accuracy of this note documented by RIP BURLESON.     Jose Kaiser MD  [facility] EMERGENCY DEPARTMENT  7/12/2024        Jose Kaiser MD  07/13/24 1017

## 2024-07-12 NOTE — ED TRIAGE NOTES
Triage Assessment (Adult)       Row Name 07/12/24 1207          Triage Assessment    Airway WDL WDL        Respiratory WDL    Respiratory WDL WDL        Skin Circulation/Temperature WDL    Skin Circulation/Temperature WDL WDL        Cardiac WDL    Cardiac WDL WDL        Peripheral/Neurovascular WDL    Peripheral Neurovascular WDL WDL        Cognitive/Neuro/Behavioral WDL    Cognitive/Neuro/Behavioral WDL WDL

## 2024-07-12 NOTE — ED TRIAGE NOTES
Pt arrives ambulatory to triage w/ c/o abnormal labs, abd pain. Pt states recent hospitalization for diverticulitis w/ perf- follow up outpatient appt labs shown to be abnormal. Notably, pt states K+ and WBC high. Abd pain LLQ, happening prior to and while having bm.

## 2024-07-13 VITALS
SYSTOLIC BLOOD PRESSURE: 107 MMHG | DIASTOLIC BLOOD PRESSURE: 64 MMHG | TEMPERATURE: 98.1 F | HEART RATE: 60 BPM | OXYGEN SATURATION: 95 % | RESPIRATION RATE: 18 BRPM

## 2024-07-13 LAB
ANION GAP SERPL CALCULATED.3IONS-SCNC: 9 MMOL/L (ref 7–15)
BUN SERPL-MCNC: 9.6 MG/DL (ref 6–20)
CALCIUM SERPL-MCNC: 8.7 MG/DL (ref 8.6–10)
CHLORIDE SERPL-SCNC: 102 MMOL/L (ref 98–107)
CREAT SERPL-MCNC: 0.78 MG/DL (ref 0.67–1.17)
DEPRECATED HCO3 PLAS-SCNC: 26 MMOL/L (ref 22–29)
EGFRCR SERPLBLD CKD-EPI 2021: >90 ML/MIN/1.73M2
ERYTHROCYTE [DISTWIDTH] IN BLOOD BY AUTOMATED COUNT: 16 % (ref 10–15)
GLUCOSE SERPL-MCNC: 111 MG/DL (ref 70–99)
HCT VFR BLD AUTO: 46 % (ref 40–53)
HGB BLD-MCNC: 15.4 G/DL (ref 13.3–17.7)
MCH RBC QN AUTO: 31.2 PG (ref 26.5–33)
MCHC RBC AUTO-ENTMCNC: 33.5 G/DL (ref 31.5–36.5)
MCV RBC AUTO: 93 FL (ref 78–100)
PLATELET # BLD AUTO: 275 10E3/UL (ref 150–450)
POTASSIUM SERPL-SCNC: 4.2 MMOL/L (ref 3.4–5.3)
RBC # BLD AUTO: 4.93 10E6/UL (ref 4.4–5.9)
SODIUM SERPL-SCNC: 137 MMOL/L (ref 135–145)
WBC # BLD AUTO: 7.3 10E3/UL (ref 4–11)

## 2024-07-13 PROCEDURE — 250N000013 HC RX MED GY IP 250 OP 250 PS 637

## 2024-07-13 PROCEDURE — 85027 COMPLETE CBC AUTOMATED: CPT

## 2024-07-13 PROCEDURE — 250N000011 HC RX IP 250 OP 636

## 2024-07-13 PROCEDURE — 82374 ASSAY BLOOD CARBON DIOXIDE: CPT

## 2024-07-13 PROCEDURE — 99239 HOSP IP/OBS DSCHRG MGMT >30: CPT | Mod: FS | Performed by: PHYSICIAN ASSISTANT

## 2024-07-13 PROCEDURE — 36415 COLL VENOUS BLD VENIPUNCTURE: CPT

## 2024-07-13 PROCEDURE — G0378 HOSPITAL OBSERVATION PER HR: HCPCS

## 2024-07-13 PROCEDURE — 96376 TX/PRO/DX INJ SAME DRUG ADON: CPT

## 2024-07-13 PROCEDURE — 99207 PR APP CREDIT; MD BILLING SHARED VISIT: CPT | Mod: FS | Performed by: STUDENT IN AN ORGANIZED HEALTH CARE EDUCATION/TRAINING PROGRAM

## 2024-07-13 RX ADMIN — METRONIDAZOLE 500 MG: 500 TABLET ORAL at 05:41

## 2024-07-13 RX ADMIN — CEFTRIAXONE SODIUM 2 G: 2 INJECTION, POWDER, FOR SOLUTION INTRAMUSCULAR; INTRAVENOUS at 05:41

## 2024-07-13 ASSESSMENT — ACTIVITIES OF DAILY LIVING (ADL)
ADLS_ACUITY_SCORE: 31

## 2024-07-13 NOTE — PROGRESS NOTES
Patient discharged from the hospital to home at 1330, transportation (cab) arranged at 1345. PIV removed, discharge instructions reviewed and given to pt. Pt to  his discharge prescription from 2nd floor pharmacy.    The patient does have evidence of prediabetes on her current lab work. Her blood sugar and hemoglobin A1c have not improved despite diet and exercise. We will start her on the once daily metformin as indicated which may also help with weight loss. We will monitor her closely. We will see her back as scheduled and she will get a CMP and hemoglobin A1c prior to that visit.

## 2024-07-13 NOTE — DISCHARGE SUMMARY
[facility]  Hospitalist Discharge Summary      Date of Admission:  7/12/2024  Date of Discharge:  7/13/2024  Discharging Provider: Manuela Villeda PA-C  Discharge Service: Hospitalist Service    Discharge Diagnoses   Acute abdominal pain  Diverticulitis  Hematochezia    Clinically Significant Risk Factors          Follow-ups Needed After Discharge   Follow up with primary care provider as scheduled next week.     Needs colonoscopy after completion of antibiotics      Discharge Disposition   Discharged to home  Condition at discharge: Stable    Hospital Course   Bella Quintero is a 48 year old man with a history of tobacco use, dyslipidemia, and recent admission in May for diverticulitis c/b perforation and peridiverticular phlegmon. He was now admitted to Medicine Observation with acute onset LLQ pain and hematochezia consistent with recurrent bout of diverticulitis. CT demonstrated eccentric mural thickening with luminal narrowing involving 10 cm segment of sigmoid colon, few adjacent prominent mesocolic nodes, confluence thickening lateral to thickened sigmoid colon with no drainable fluid collection. Pain resolved the following morning and he was tolerating a regular diet. Started on IV antibiotics and discharged on Augmentin to complete a 10-day course.     He will need a colonoscopy after antibiotic course completed. He already has a referral from his PCP. Has PCP follow-up scheduled on 7/16.     Consultations This Hospital Stay   None    Code Status   Full Code    Time Spent on this Encounter   I, Manuela Villeda PA-C, personally saw the patient today and spent greater than 30 minutes discharging this patient.       DAMION Brady [facility] [address] OBSERVATION  [address] [address]  Phone: [phone]  Fax:  [phone]  ______________________________________________________________________    Physical Exam   Vital Signs: Temp: 98.3  F (36.8  C) Temp src: Oral BP: 97/64 Pulse: 57   Resp: 18 SpO2: 93 % O2 Device: None (Room air)    Weight: 0 lbs 0 oz  Constitutional: Awake and alert, in no apparent distress.   Eyes: Sclera clear, anicteric   Respiratory: Breathing non-labored. CTAB  Cardiovascular:  RRR, normal S1/S2. No rubs or murmurs. No peripheral edema.   GI: Soft, non-tender, non-distended. Normoactive bowel sounds.   Skin:  Good color. No jaundice. No visible rashes, lesions, or bruising of concern.   Neurologic: Alert and oriented.       Primary Care Physician   Nyla Pastor    Discharge Orders      Reason for your hospital stay    Dear Bella Quintero,    You were hospitalized at [facility] with abdominal pain caused by a recurrent bout of diverticulitis. Your CT scan shows inflammatory changes but no abscess. You will need to complete a course of antibiotics. After your antibiotics are done you need to have a colonoscopy to make sure the inflammation is gone and to rule out any possibility of cancer.  You should continue to improve but if you develop fever, shortness of breath, light headedness, chest pain or otherwise worsen please seek medical attention.      Take care!    Manuela Villeda PA-C   Hospitalist Service     Activity    Your activity upon discharge: activity as tolerated     Adult [facility] Follow-up and recommended labs and tests    Follow up with primary care provider as scheduled next week.     Please have a colonoscopy done at the next available appointment    Appointments on [place] and/or [facility] (with [facility] or [facility] provider or service). Call [phone] if you haven't heard regarding these appointments within 7 days of discharge.     Diet    Follow this diet upon discharge: Continue a regular diet. Work on increasing fiber in your diet and  continue with the positive changes you have made already.       Significant Results and Procedures   ROUTINE IP LABS (Last four results)  Recent Labs   Lab 07/13/24  0536 07/12/24  1236 07/09/24  0948    138 138   POTASSIUM 4.2 4.9 5.5*   CHLORIDE 102 102 101   CO2 26 27 29   ANIONGAP 9 9 8   * 110* 156*   BUN 9.6 10.4 11.9   CR 0.78 0.84 0.93   NORMA 8.7 9.0 9.7   PROTTOTAL  --  7.4  --    ALBUMIN  --  4.2  --    BILITOTAL  --  0.5  --    ALKPHOS  --  116  --    AST  --  16  --    ALT  --  11  --      Recent Labs   Lab 07/13/24 0536 07/12/24  1236 07/09/24  0948   WBC 7.3 8.8 14.5*   RBC 4.93 5.27 5.50   HGB 15.4 16.3 17.0   HCT 46.0 50.7 52.4   MCV 93 96 95   MCH 31.2 30.9 30.9   MCHC 33.5 32.1 32.4   RDW 16.0* 15.9* 16.2*    319 317     No lab results found in last 7 days.         Discharge Medications   Current Discharge Medication List        START taking these medications    Details   amoxicillin-clavulanate (AUGMENTIN) 875-125 MG tablet Take 1 tablet by mouth 2 times daily  Qty: 19 tablet, Refills: 0    Associated Diagnoses: Diverticulitis of colon           CONTINUE these medications which have NOT CHANGED    Details   ketoconazole (NIZORAL) 2 % external shampoo Apply topically daily as needed for itching or irritation  Qty: 100 mL, Refills: 2    Associated Diagnoses: Folliculitis           Allergies   Allergies   Allergen Reactions    Codeine Swelling and Itching     PN: LW Reaction: EDEMA, GENERALIZED   Tolerated hydromorphone 5/1/24    Penicillins Other (See Comments)     Swelling, burning feeling in hands and feet. , PN: LW Reaction: EDEMA, GENERALIZED

## 2024-07-13 NOTE — PROGRESS NOTES
Goal Outcome Evaluation:    -diagnostic tests and consults completed and resulted Not met  -vital signs normal or at patient baseline Met  -tolerating oral intake to maintain hydration Met  -adequate pain control on oral analgesics Met- Pt denies pain  -tolerating oral antibiotics or has plans for home infusion setup Not met  -returns to baseline functional status Not met  -safe disposition plan has been identified Not met

## 2024-07-13 NOTE — PROGRESS NOTES
Care Management Discharge Note    Discharge Date: 07/13/2024     Discharge Disposition: Home    Discharge Services: None    Discharge DME: None    Discharge Transportation: Taxi    Additional Information:  This writer received a message from bedside RN that pt is medically ready for discharge home today but needs help with arranging a ride. Pt typically uses [employer] but they are closed on the weekend. Pt states he does not have the ability to pay for a ride home. Per bedside RN, pt is independent and safe to take a standard cab ride. Bedside RN confirmed home address and pt's cell phone number.    [employer]  Phone: [phone]  Cab ride arranged to home. Bedside RN aware.     CC will continue to monitor patient's medical condition and progress towards discharge.  Madeline Fernando RN BSN  6C Unit Care Coordinator  Phone number: [phone]  Pager: [phone]

## 2024-07-13 NOTE — PROGRESS NOTES
Goal Outcome Evaluation:     -diagnostic tests and consults completed and resulted: Met  -vital signs normal or at patient baseline Met  -tolerating oral intake to maintain hydration Met  -adequate pain control on oral analgesics Met- Pt denies pain  -tolerating oral antibiotics or has plans for home infusion setup: Met  -returns to baseline functional status: Met  -safe disposition plan has been identified: Met: discharging to home today once transportation is arranged.

## 2024-07-15 ENCOUNTER — PATIENT OUTREACH (OUTPATIENT)
Dept: CARE COORDINATION | Facility: CLINIC | Age: 49
End: 2024-07-15
Payer: COMMERCIAL

## 2024-07-15 NOTE — PROGRESS NOTES
Clinic Care Coordination Contact  Transitions of Care Outreach    Chief Complaint   Patient presents with    Clinic Care Coordination - Post Hospital       Most Recent Admission Date: 7/12/2024   Most Recent Admission Diagnosis: Acute colitis - K52.9  Left lower quadrant abdominal pain - R10.32     Most Recent Discharge Date: 7/13/2024   Most Recent Discharge Diagnosis: Acute colitis - K52.9  Left lower quadrant abdominal pain - R10.32  Diverticulitis of colon - K57.32     Transitions of Care Assessment    Discharge Assessment  How are you doing now that you are home?: RN called and spoke with patient. Pt states that he is doing alright. Pt has all of his medications, declines further care coordination needs. Pt has appt scheduled with PCP tomorrow and RN reviewed with patient- no concerns.  How are your symptoms? (Red Flag symptoms escalate to triage hotline per guidelines): Improved  Do you know how to contact your clinic care team if you have future questions or changes to your health status? : Yes  Does the patient have their discharge instructions? : Yes  Does the patient have questions regarding their discharge instructions? : No  Were you started on any new medications or were there changes to any of your previous medications? : Yes  Does the patient have all of their medications?: Yes  Do you have questions regarding any of your medications? : No  Do you have all of your needed medical supplies or equipment (DME)?  (i.e. oxygen tank, CPAP, cane, etc.): Yes                Follow up Plan     Discharge Follow-Up  Discharge follow up appointment scheduled in alignment with recommended follow up timeframe or Transitions of Risk Category? (Low = within 30 days; Moderate= within 14 days; High= within 7 days): Yes  Discharge Follow Up Appointment Date: 07/16/24  Discharge Follow Up Appointment Scheduled with?: Primary Care Provider    Future Appointments   Date Time Provider Department Center   7/16/2024  9:30 AM  Nyla Pastor MD [facility]       Outpatient Plan as outlined on AVS reviewed with patient.      For any urgent concerns, please contact our 24 hour nurse triage line: [phone]       VAUGHN CARRILLO RN

## 2024-07-16 ENCOUNTER — OFFICE VISIT (OUTPATIENT)
Dept: INTERNAL MEDICINE | Facility: CLINIC | Age: 49
End: 2024-07-16
Payer: COMMERCIAL

## 2024-07-16 VITALS
SYSTOLIC BLOOD PRESSURE: 151 MMHG | OXYGEN SATURATION: 95 % | DIASTOLIC BLOOD PRESSURE: 95 MMHG | HEART RATE: 78 BPM | WEIGHT: 232.2 LBS

## 2024-07-16 DIAGNOSIS — F17.200 TOBACCO USE DISORDER: ICD-10-CM

## 2024-07-16 DIAGNOSIS — F10.90 ALCOHOL USE DISORDER: ICD-10-CM

## 2024-07-16 DIAGNOSIS — K57.32 DIVERTICULITIS OF COLON: Primary | ICD-10-CM

## 2024-07-16 DIAGNOSIS — E78.5 HYPERLIPIDEMIA LDL GOAL <100: ICD-10-CM

## 2024-07-16 DIAGNOSIS — Z12.11 SCREEN FOR COLON CANCER: ICD-10-CM

## 2024-07-16 PROCEDURE — 99496 TRANSJ CARE MGMT HIGH F2F 7D: CPT | Mod: GC

## 2024-07-16 RX ORDER — ATORVASTATIN CALCIUM 10 MG/1
20 TABLET, FILM COATED ORAL DAILY
Qty: 30 TABLET | Refills: 3 | Status: SHIPPED | OUTPATIENT
Start: 2024-07-16

## 2024-07-16 NOTE — PROGRESS NOTES
Assessment & Plan     Jarod is a 49 yo returning today for hospital follow up following his second admission in three months with diverticulitis of the sigmoid colon (see below). We also discussed his cholesterol and alcohol use briefly. Overall, we agreed to take things one step at a time for him with several follow ups planned in the future - he is glad is able to see a provider now that he has reliable insurance and feels comfortable coming here.     Diverticulitis of colon  Screen for colon cancer - working on scheduling per pt, referral placed 7/9/2024  Pt with 2 episodes of diverticulitis 5/2024 and again 7/2024, both requiring admission and IV antibiotics. He is currently finishing his rx for Augmentin with EoT 7/20 and, while he is experiencing some post-treatment bloating, is otherwise doing well and tolerating food well. He is having no more diarrhea or hematochezia. Jarod acknowledges the importance of scheduling his colonoscopy now that this has occurred twice in the last three months; he has the number to call to schedule his and will do so today after his appt. He will MyChart me when this is done so we are on the same page with progression of his care. No other concerns at this time, discussed with him the importance of completing his course of antibiotics and good dietary choices; he has handouts from the hospital on diets iso diverticulitis and is working on following this.     Hyperlipidemia LDL goal <100  Pt with elevated lipids (see below) on recent labs. Thinks he has been treated for this in the past but is not sure, acknowledges that his diet needs work and he could be more active. Would prefer to start treatment with a medication in addition to working on lifestyle changes after we discussed his ASCVD risk together (15.7% - Intermediate) in addition to his smoking and diet.   Will recheck his lipids again in three months (10/2024). Plan to check A1c at that time as well.   -     atorvastatin  (LIPITOR) 10 MG tablet; Take 2 tablets (20 mg) by mouth daily    Alcohol use disorder  Discussed with Jarod his alcohol use, particularly in the context of his recent colonic diagnoses, blood pressure today, and his cholesterol levels. He understands the risk that increased alcohol intake poses to his health, in general, including the toxic effects that this has throughout his body. We reviewed his current plan for cutting back together and I congratulated him on his progress thus far - he seems to have good insight into how to make these changes and the importance of doing so.   Would like to revisit this discuss again in one month as we work on his health maintenance together now that he is routinely able to follow with a physician.   He declined any referrals to speak with someone about his substance use. His previously elevated use seems to align with the stress he was dealing with related to his 's worsening health and now that this has improved slightly he is more aware of his use and willing to decrease it.   -- Consider PHQ9 at next visit given discussion surrounding substance use and stress  -- Discuss with him therapy to help manage his stressors better   -- Review guidelines for EtOH use with pt again and try to form action plan together     Tobacco use disorder  He declined to discuss this further today given that he would prefer to work on one thing at a time when it comes to decreasing his substance use, which is reasonable. He understands that his long term tobacco use poses elevated risks to his health and does want to eventually discuss cessation - he vocalizes willingness to do so at our next appointment once he has his current health conditions under better control.   -- Work on smoking cessation plan, consider Chantix or other adjuncts to assist with cessation   -- Give info on quit resources at next visit     Follow up plan for 8/2024:  Return in about 4 weeks (around 8/13/2024) for with  resident colleague to discuss 1) physical therapy and R knee discomfort 2) alcohol use and planning for decrease 3) tobacco use disorder and planning for cessation/help with cessation. Please discuss PCV20 with patient at this visit. Dr. Pastor inpatient and will not be able to see patient. Would like to see patient in October 2023 to recheck lifestyle and lipids - please schedule this with JarodChong Blanco is a 48 year old, presenting for the following health issues:      7/16/2024     9:27 AM   Additional Questions   Roomed by R     Hospital follow up 7/:  Saw pt in clinic to establish care 7/9 after his first hospitalization with diverticulitis 5/2024. Had ordered a colonoscopy for pt at that time in addition to basic labs. He was asymptomatic at his visit but labs suprisingly returned with leukocytosis to 14.5. Upon calling pt, he was asymptomatic but agreed to either 1) come into clinic the following week for CT of his abdomen given c/f forming abscess + recheck of his symptoms or 2) if symptoms formed over the interim to be seen at the [facility] ED. Unfortunately, he presented to the [facility] ED 7/12 with worsening LLQ pain and subjective fevers in addition to hematochezia. CT of his abdomen as below showing sigmoid diverticulitis without abscess. Pt was started on IV antibiotics and by 7/13 his pain was resolved and he was tolerating PO. He was discharged on PO Augmentin x10 days (EoT 7/20) with instructions for follow up here and scheduling a colonoscopy sooner rather than later given recurrence.     Hospital Follow-up Visit:    Hospital/Nursing Home/IP Rehab Facility: [facility]  Date of Admission: 7/12     Date of Discharge: 7/13   Reason(s) for Admission: Diverticulitis   Was the patient in the ICU or did the patient experience delirium during hospitalization?  No  Do you have any other stressors you would like to discuss with your provider? Health  Concerns    Problems taking medications regularly:  None  Medication changes since discharge: Augmentin   Problems adhering to non-medication therapy:  None    Summary of hospitalization:  [facility] discharge summary reviewed  Diagnostic Tests/Treatments reviewed.    CT Abdomen Pelvis w Contrast 7/12/2024  IMPRESSION:     1. Eccentric appearing mural thickening with luminal narrowing, in  metabolic enhancement and pericolonic streakiness involving 10 cm  segment of sigmoid colon, concerning for possible  colitis/diverticulitis with few adjacent prominent mesocolic nodes.  Recommend posttreatment colonoscopy to rule out underlying neoplastic  lesion  2. Confluent thickening right lateral to the thickened sigmoid colon  along the lateral pelvic fascia possibly phlegmon; no drainable fluid  collection.  3. Few prominent retroperitoneal lymph nodes, as above, consider  attention on follow-up.  4. Lucency along the anterosuperior L2 vertebral body, indeterminate,  possible demineralization recommend correlation with prior imaging if  available and/or attention on follow-up as clinically desired.  5. Borderline splenomegaly.        [Recommend Follow Up: Colonoscopy post treatment]     This report will be copied to the [facility] to ensure a  provider acknowledges the finding. [facility] is available Monday  through Friday 8am-3:30 pm.     I have personally reviewed the examination and initial interpretation  and I agree with the findings.     ANDI KISER MD   Follow up needed: Patient will be calling to schedule the colonoscopy today or tomorrow - he has the number to call. The plan is to schedule it 4-6 weeks out to let things calm down and then revisit these results together.   Other Healthcare Providers Involved in Patient s Care:         None  Update since discharge: improved.     Hyperlipidemia   Pt had lipid panel drawn at [facility] establishing care 7/9 as below. Thinks he was  prescribed Lipitor in the distant past but is not currently taking. No immediate family hx of hyperlipidemia or ACS that he is aware of. He notes that his current diet could be better given that he eats at work often. Additionally, he is working on improving his alcohol intake given his recent episodes of diverticulitis in addition to recognizing his 's health issues. Pt would like to focus on both lifestyle changes and starting a medication given his risk factors and cholesterol levels.     Lab Results   Component Value Date    CHOL 252 07/09/2024     Lab Results   Component Value Date    HDL 42 07/09/2024     Lab Results   Component Value Date     07/09/2024     Lab Results   Component Value Date    TRIG 157 07/09/2024     The 10-year ASCVD risk score (Garry SANTOS, et al., 2019) is: 15.7%    Values used to calculate the score:      Age: 48 years      Sex: Male      Is Non- : No      Diabetic: No      Tobacco smoker: Yes      Systolic Blood Pressure: 151 mmHg      Is BP treated: No      HDL Cholesterol: 42 mg/dL      Total Cholesterol: 252 mg/dL         Alcohol Use  Was drinking 3 bottles of wine per week prior to his episodes of diverticulitis but has cut down on his drinking after his two recurrent episodes of diverticulitis and is down to 1-2 bottles of wine per week when he is not on antibiotics. 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     His  had necrotizing pancreatitis in the past and now is dealing with the after effects from this including diabetes. This was very stressful for the patient, in addition to his 's drug use. Now, his  has stopped using drugs and is seeing his doctors more regularly. Because of this, [place]'s stress has lessened  significantly and that decline in stress has also decreased his desire to drink alcohol as much.    Tobacco use  Would like to discuss this at a later time.         Objective    BP (!) 151/95 (BP Location: Right arm, Patient Position: Sitting, Cuff Size: Adult Regular)   Pulse 78   Wt 105.3 kg (232 lb 3.2 oz)   SpO2 95%   There is no height or weight on file to calculate BMI.    Physical Exam  Vitals and nursing note reviewed.   Constitutional:       Appearance: Normal appearance. He is obese.      Comments: Smells of tobacco   Cardiovascular:      Rate and Rhythm: Normal rate and regular rhythm.      Pulses: Normal pulses.      Heart sounds: Normal heart sounds.   Pulmonary:      Effort: Pulmonary effort is normal.      Breath sounds: Normal breath sounds. No wheezing.   Abdominal:      General: Abdomen is flat. Bowel sounds are normal. There is no distension.      Palpations: Abdomen is soft.      Tenderness: There is no abdominal tenderness. There is no guarding or rebound.   Neurological:      General: No focal deficit present.      Mental Status: He is alert.   Psychiatric:         Mood and Affect: Mood normal.         Behavior: Behavior normal.         Thought Content: Thought content normal.              Nyla Pastor MD  Internal Medicine Resident, PGY1

## 2024-07-17 RX ORDER — ATORVASTATIN CALCIUM 10 MG/1
10 TABLET, FILM COATED ORAL DAILY
Qty: 30 TABLET | Refills: 3 | Status: SHIPPED | OUTPATIENT
Start: 2024-07-17

## 2024-07-18 ENCOUNTER — HOSPITAL ENCOUNTER (OUTPATIENT)
Facility: AMBULATORY SURGERY CENTER | Age: 49
End: 2024-07-18
Attending: INTERNAL MEDICINE | Admitting: INTERNAL MEDICINE
Payer: COMMERCIAL

## 2024-07-18 ENCOUNTER — TELEPHONE (OUTPATIENT)
Dept: GASTROENTEROLOGY | Facility: CLINIC | Age: 49
End: 2024-07-18
Payer: COMMERCIAL

## 2024-07-18 DIAGNOSIS — K57.32 DIVERTICULITIS OF COLON: Primary | ICD-10-CM

## 2024-07-18 NOTE — LETTER
2024      Bella Quintero  [address]  [facility] [address]              Golytely Extended Bowel Prep   Prep instructions for your colonoscopy   For prep questions, please call: [facility] [address] [place] [address], [address] Floor, [place] [address] - [phone] option 2    Bowel prep was sent 2024 to    [place], [place] - [address] [place] [address] [id number]    Please read these instructions carefully at least 7 days prior to your colonoscopy procedure. Be sure to follow all directions completely. The inside of your colon must be clean to allow for a complete examination for the presence of any growths, polyps, and/or abnormalities, as well as their biopsy or removal. A number of tips are included in order to make this part of the procedure as comfortable as possible.    Getting ready      prescription at the pharmacy. Endoscopy team will order this about 2 weeks before to your scheduled procedure. Included in the kit will be the followin  Dulcolax (Bisacodyl) 5mg tablets  Two containers of Polyethylene glycol (Golytely, Colyte, Nulytely, Gavilyte-G)    A nurse will call you to go over your appointment details and prep instructions. Not completing the nurse call could result with your appointment being cancelled.    You must arrange for an adult to drive you home after your exam. Your colonoscopy cannot be done unless you have a ride. If you need to use public transportation, someone must ride with you and stay with you for up to 24 hours.       7 days before procedure     Consult with your prescribing provider about stopping any:     Diabetic/Weight Loss Injectable Medication GLP-1 agonist (such as Exenatide (Byetta, Bydureon),  Mounjaro (Tirzepatide).  Ozempic (Semaglutide). Semaglutide. Symlin (Pamlintide), Tanzeum (Albiglutide). Tirzepatide-Weight Management (Zepbound), Trulicity (Dulaglutide), Victoza (Saxenda, Liraglutide),  Wegovy (Semaglutide) please follow below guidelines for holding:    For weekly injection HOLD 7 days before procedure.  For once or twice a day injection HOLD the day before procedure and day of procedure.  For oral, daily dosing (Rybelsus) HOLD 7 days before procedure.    Blood thinning and/or anti-platelet medications: such as Coumadin, Plavix, Xarelto, Eliquis, Lovenox or others, these medications may need to be stopped temporarily before your procedure.     If you take insulin for diabetes, ask your prescribing provider for instructions on how to manage this medication while preparing for a colonoscopy.      Stop taking iron (ferrous sulfate), multivitamins that contain iron, and/or fiber supplements (Metamucil, Benefiber, Psyllium husk powder, Fibercon, Bran, etc.).      Stop eating whole kernel corn, popcorn, nuts, and foods that contain seeds. These can stay in the colon for many days, and they can clog up the colonoscope.    Begin a low-fiber diet. (See examples below). No Olestra (a fat substitute).   Consume no more than 10-15 grams of fiber each day.         LOW FIBER DIET   You may have: Do not have:    Starches: White bread, rolls, biscuits, croissants, [place] toast, white flour tortillas, waffles, pancakes, [place] toast; white rice, noodles, pasta, macaroni; cooked and peeled potatoes; plain crackers, saltines; cooked farina or cream of rice; puffed rice, corn flakes, Rice Krispies, Special K      Vegetables: tender cooked and canned, vegetable broths     Fruits and fruit juices: Strained fruit juice, canned fruit without seeds or skin (not pineapple), applesauce, pear sauce, ripe bananas, melons (not watermelon)     Milk products: Milk (plain or flavored), cheese, cottage cheese, yogurt (no berries), custard, ice cream       Proteins: Tender, well-cooked ground beef, lamb, veal, ham, pork, chicken, turkey, fish or organ meat, Tofu, eggs, creamy peanut butter      Fats and condiments: Margarine, butter,  oils, mayonnaise, sour cream, salad dressing, plain gravy; spices, cooked herbs; sugar, clear jelly, honey, syrup      Snacks, sweets and drinks: Pretzels, hard candy; plain cakes and cookies (no nuts or seeds); gelatin, plain pudding, sherbet, Popsicles; coffee, tea, carbonated ( fizzy ) drinks     Starches: Breads or rolls that contain nuts, seeds or fruit; whole wheat or whole grain breads that contain more than 2 grams of fiber per serving; cornbread; corn or whole wheat tortillas; potatoes with skin; brown rice, wild rice, quinoa, kasha (buckwheat), and oatmeal      Vegetables: Any raw or steamed vegetables; vegetables with seeds; corn in any form      Fruits and fruit juices: Prunes, prune juice, raisins and other dried fruits, berries and other fruits with seeds, canned pineapple juices with pulp such as orange, grapefruit, pineapple or tomato juice     Milk products: Any yogurt with nuts, seeds or berries      Proteins: Tough, fibrous meats with gristle; cooked dried beans, peas or lentils; crunchy peanut butter     Fats and condiments: Pickles, olives, relish, horseradish; jam, marmalade, preserves      Snacks, sweets and drinks: Popcorn, nuts, seeds, granola, coconut, candies made with nuts or seeds; all desserts that contain nuts, seeds, raisins and other dried fruits, coconut, whole grains or bran.       2 days before procedure       You may have a light, low fiber breakfast and lunch. Begin a clear liquid diet AFTER 1 PM. Do not eat solid foods. (See examples below).    Drink at least eight to ten 8-ounce glasses of water throughout the day  ? ? ? ? ? ? ? ?    Fill one container of Golytely with a full gallon of warm water. Cover and shake until well mixed. Chill in refrigerator until it is time to drink solution.     CLEAR LIQUID DIET:  You can have: Do not have:    Water, tea, coffee (no milk or cream)   Soda pop, Gatorade (not red or purple)   Coconut water   Jell-O, Popsicles (no milk or fruit  pieces - not red or purple)   Fat-free soup broth or bouillon   Plain hard candy, such as clear life savers (not red or purple)   Clear juices and fruit-flavored drinks, such as apple juice, white grape juice, Hi-C, and Chip-Aid (not red or purple)    Milk or milk products such as ice cream, malts or shakes, or coffee creamer   Red or purple drinks of any kind such as cranberry juice, grape juice, or Chip-Aid. Avoid red or purple Jell-O, Popsicles, sorbet, sherbet and candy.   Juices with pulp such as orange, grapefruit, pineapple, or tomato juice   Cream soups of any kind   Alcohol and beer   Protein drinks or protein powder     Step 1     At 4 PM, take 2 Dulcolax (Bisacodyl) tablets.  At 5 PM, start drinking one 8-ounce glass of Golytely mixture every 15 minutes until the container is HALF empty. Drink each glass quickly. Store the rest in the refrigerator.   Continue to drink clear liquids.      Reminders While Drinking Laxatives:     After you start drinking the solution, stay near a toilet. You may have watery stools (diarrhea), mild cramping, bloating, and nausea. You may want to use Vaseline on the skin around your anus after each bowel movement or use wet wipes to prevent irritation. Bowel movements will be liquid and dark in color at first and then should turn clear yellow in color. Drinking the prepared solution may make you cold, wearing warm clothing can be helpful.    Some find it helpful to:  For added flavor, include a crystal light lemonade packet in each glass of Golytely, rather than mixing it into the entire prepared mixture.  In between each glass, try sucking on a lemon or a piece of hard candy to help diminish the flavor of the preparation.  Drinking from a straw can help minimize the taste of the prepared mixture.    If you have nausea or vomiting during drinking the solution, rinse your mouth with water and take a 15-30 minute break and then continue drinking solution.         1 day before  procedure       Continue on a clear liquid diet. Do not eat solid foods.    Drink at least eight to ten 8-ounce glasses of water throughout the day  ? ? ? ? ? ? ? ?    Step 2     At 4 PM, take 2 Dulcolax (Bisacodyl) tablets.  At 5 PM, start drinking the 2nd half of Golytely mixture. Drink one 8-ounce glass of Golytely mixture every 15 minutes until the container is empty.  Drink each glass quickly.   Continue to drink clear liquids.    Before you go to bed mix the second container of Golytely and place in the refrigerator for the morning.     Day of procedure     Step 3     6 hours before your arrival time, start drinking one 8-ounce glass of Golytely mixture every 15 minutes until the container is HALF empty. You will not drink the entire container. The remaining solution can be discarded.     2 hours before your arrival time stop drinking all liquids, including water.   Do not smoke or swallow anything, including water or gum for at least 2 hours before your arrival time. This is a safety issue. Your procedure could be cancelled if you do not follow directions.  No chewing tobacco 6 hours prior to procedure arrival time.     You may take your necessary morning medications with sips of water (4 ounces).   Do not take diabetes medicine by mouth until after your exam.  If you have asthma, bring your inhalers.  Please perform your nebulizer treatments and airway clearance therapy in the morning prior to the procedure (if applicable).    Arrive with a responsible adult who can drive you home and stay with you for a minimum of 24 hours. The medications used during the procedure will make you sleepy, so you won't be able to drive yourself home.   You cannot use public transportation, ride-share services, or non-medical taxi services without a responsible caregiver. Medical transport services are okay, but a caregiver must be there to receive you at your destination.  Please check in with your  when you arrive.  Drivers should stay on campus.    Expect to be at the procedure center for about 1.5-2.5 hours.    Do not wear jewelry (i.e. earrings, rings, necklaces, watches, etc.). Leave your purse, billfold, credit cards, and other valuables at home.      Bring insurance card and ID.         Answers to Commonly Asked Questions     How soon can I eat after the procedure?  You may resume your normal diet when you feel ready, unless advised otherwise by the doctor performing your procedure. We recommend starting with a light meal.   Do not drink alcohol for 24 hours after your procedure.  You may resume normal activities (work, exercise, etc.) after 24 hours.    How will I feel after the procedure?  It is normal to feel bloated and gassy after your procedure. Walking will help move the air through your colon. You can take non-aspirin pain relievers that contain acetaminophen (Tylenol).  If you are having sedation, we require a responsible adult to take you home for your safety. The sedation medicines used to relax you during the procedure can impair your judgement and reaction time and make you forgetful and possibly a little unsteady.  Do not drive, make any important decisions, or sign any legal documents for 24 hours after your procedure.    When will I get my test results?  You should have your procedure results and any lab results (if applicable) by letter, [employer] message, or phone call within 2 weeks. If you have any questions, please call the doctor that referred you for the procedure.    How do I know if my colon is cleaned out?   After completing the bowel prep, your bowel movements should be all liquid and yellow. Your bowel movements will look similar to urine in the toilet. If there are pieces of stool (poop) in the toilet, or if you can't see to the bottom of the toilet, please call our office for advice. Call [phone] and ask to speak with a nurse.    Why is the Golytely bowel prep taken in several steps?   The  stool is flushed out by a large wave of fluid going through the colon. Just sipping a large volume of the solution will not achieve the desired result. Studies have shown that two smaller waves (or more in some cases) are better than one large one.      What if I need to cancel or reschedule my procedure?  Contact our endoscopy scheduling team at [phone], option 2. Monday through Friday, 7:00am-5:00pm.

## 2024-07-18 NOTE — TELEPHONE ENCOUNTER
"Endoscopy Scheduling Screen    Have you had a positive Covid test in the last 14 days?  No    What is your communication preference for Instructions and/or Bowel Prep?   Mail/USPS    What insurance is in the chart?  Other:  [employer]    Ordering/Referring Provider: NITA TREVINO    (If ordering provider performs procedure, schedule with ordering provider unless otherwise instructed. )    BMI: There is no height or weight on file to calculate BMI.     Sedation Ordered  moderate sedation.   If patient BMI > 50 do not schedule in [facility].    If patient BMI > 45 do not schedule at [facility].    Are you taking methadone or Suboxone?  No    Have you had difficulties, pain, or discomfort during past endoscopy procedures?  No    Are you taking any prescription medications for pain 3 or more times per week?   NO, No RN review required.    Do you have a history of malignant hyperthermia?  No       Have you been diagnosed or told you have pulmonary hypertension?   No    Do you have an LVAD?  No    Have you been told you have moderate to severe sleep apnea?  Yes (RN Review required for scheduling unless scheduling in Hospital.)    Have you been told you have COPD, asthma, or any other lung disease?  No    Do you have any heart conditions?  No     Have you ever had or are you waiting for an organ transplant?  No. Continue scheduling, no site restrictions.    Have you had a stroke or transient ischemic attack (TIA aka \"mini stroke\" in the last 6 months?   No    Have you been diagnosed with or been told you have cirrhosis of the liver?   No    Are you currently on dialysis?   No    Do you need assistance transferring?   No    BMI: There is no height or weight on file to calculate BMI.     Is patients BMI > 40 and scheduling location [facility]?  No    Do you take an injectable medication for weight loss or diabetes (excluding insulin)?  No    Do you take the medication Naltrexone?  No    Do you take blood thinners?  Yes     Are " you taking Effient/Prasugrel?  No, you must contact your prescribing provider for direction on holding or bridging with a different medication.       Prep   Are you currently on dialysis or do you have chronic kidney disease?  No    Do you have a diagnosis of diabetes?  No    Do you have a diagnosis of cystic fibrosis (CF)?  No    On a regular basis do you go 3 -5 days between bowel movements?  No    BMI > 40?  No    Preferred Pharmacy:  No Pharmacies Listed    [address] [phone] [[id number]]     Final Scheduling Details     Procedure scheduled  Colonoscopy    Surgeon:  Tiffani     Date of procedure:  10/18/24     Pre-OP / PAC:   No - Not required for this site.    Location  [facility] - Patient preference.    Sedation   MAC/Deep Sedation - Per RN assessment.      Patient Reminders:   You will receive a call from a Nurse to review instructions and health history.  This assessment must be completed prior to your procedure.  Failure to complete the Nurse assessment may result in the procedure being cancelled.      On the day of your procedure, please designate an adult(s) who can drive you home stay with you for the next 24 hours. The medicines used in the exam will make you sleepy. You will not be able to drive.      You cannot take public transportation, ride share services, or non-medical taxi service without a responsible caregiver.  Medical transport services are allowed with the requirement that a responsible caregiver will receive you at your destination.  We require that drivers and caregivers are confirmed prior to your procedure.

## 2024-07-18 NOTE — TELEPHONE ENCOUNTER
Pre Assessment RN Review    Per patient's chart, he has not had a sleep study done in the past. Patient reports to scheduling he does not use home O2. Sleep apnea possible per pt chart, but no info to corroborate. 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    [facility] approved. MAC due to hx drug use.

## 2024-07-28 ENCOUNTER — HEALTH MAINTENANCE LETTER (OUTPATIENT)
Age: 49
End: 2024-07-28

## 2024-08-03 ENCOUNTER — HOSPITAL ENCOUNTER (EMERGENCY)
Facility: CLINIC | Age: 49
Discharge: HOME | End: 2024-08-03
Attending: EMERGENCY MEDICINE | Admitting: EMERGENCY MEDICINE
Payer: COMMERCIAL

## 2024-08-03 ENCOUNTER — APPOINTMENT (OUTPATIENT)
Dept: CT IMAGING | Facility: CLINIC | Age: 49
End: 2024-08-03
Attending: EMERGENCY MEDICINE
Payer: COMMERCIAL

## 2024-08-03 VITALS
TEMPERATURE: 98.3 F | RESPIRATION RATE: 18 BRPM | DIASTOLIC BLOOD PRESSURE: 82 MMHG | SYSTOLIC BLOOD PRESSURE: 122 MMHG | OXYGEN SATURATION: 97 % | HEART RATE: 76 BPM

## 2024-08-03 DIAGNOSIS — R10.84 GENERALIZED ABDOMINAL PAIN: ICD-10-CM

## 2024-08-03 LAB
ALBUMIN SERPL BCG-MCNC: 4.1 G/DL (ref 3.5–5.2)
ALBUMIN UR-MCNC: NEGATIVE MG/DL
ALP SERPL-CCNC: 96 U/L (ref 40–150)
ALT SERPL W P-5'-P-CCNC: 11 U/L (ref 0–70)
ANION GAP SERPL CALCULATED.3IONS-SCNC: 9 MMOL/L (ref 7–15)
APPEARANCE UR: CLEAR
AST SERPL W P-5'-P-CCNC: 13 U/L (ref 0–45)
BASOPHILS # BLD AUTO: 0.1 10E3/UL (ref 0–0.2)
BASOPHILS NFR BLD AUTO: 1 %
BILIRUB SERPL-MCNC: 0.4 MG/DL
BILIRUB UR QL STRIP: NEGATIVE
BUN SERPL-MCNC: 11.6 MG/DL (ref 6–20)
CALCIUM SERPL-MCNC: 8.8 MG/DL (ref 8.8–10.4)
CHLORIDE SERPL-SCNC: 104 MMOL/L (ref 98–107)
COLOR UR AUTO: ABNORMAL
CREAT SERPL-MCNC: 0.73 MG/DL (ref 0.67–1.17)
EGFRCR SERPLBLD CKD-EPI 2021: >90 ML/MIN/1.73M2
EOSINOPHIL # BLD AUTO: 0.3 10E3/UL (ref 0–0.7)
EOSINOPHIL NFR BLD AUTO: 4 %
ERYTHROCYTE [DISTWIDTH] IN BLOOD BY AUTOMATED COUNT: 14.8 % (ref 10–15)
GLUCOSE SERPL-MCNC: 101 MG/DL (ref 70–99)
GLUCOSE UR STRIP-MCNC: NEGATIVE MG/DL
HCO3 SERPL-SCNC: 26 MMOL/L (ref 22–29)
HCT VFR BLD AUTO: 47.9 % (ref 40–53)
HGB BLD-MCNC: 15.7 G/DL (ref 13.3–17.7)
HGB UR QL STRIP: NEGATIVE
IMM GRANULOCYTES # BLD: 0.1 10E3/UL
IMM GRANULOCYTES NFR BLD: 1 %
KETONES UR STRIP-MCNC: NEGATIVE MG/DL
LACTATE SERPL-SCNC: 1.1 MMOL/L (ref 0.7–2)
LEUKOCYTE ESTERASE UR QL STRIP: NEGATIVE
LYMPHOCYTES # BLD AUTO: 2.2 10E3/UL (ref 0.8–5.3)
LYMPHOCYTES NFR BLD AUTO: 24 %
MCH RBC QN AUTO: 31.3 PG (ref 26.5–33)
MCHC RBC AUTO-ENTMCNC: 32.8 G/DL (ref 31.5–36.5)
MCV RBC AUTO: 95 FL (ref 78–100)
MONOCYTES # BLD AUTO: 0.7 10E3/UL (ref 0–1.3)
MONOCYTES NFR BLD AUTO: 8 %
MUCOUS THREADS #/AREA URNS LPF: PRESENT /LPF
NEUTROPHILS # BLD AUTO: 5.8 10E3/UL (ref 1.6–8.3)
NEUTROPHILS NFR BLD AUTO: 62 %
NITRATE UR QL: NEGATIVE
NRBC # BLD AUTO: 0 10E3/UL
NRBC BLD AUTO-RTO: 0 /100
PH UR STRIP: 6.5 [PH] (ref 5–7)
PLATELET # BLD AUTO: 302 10E3/UL (ref 150–450)
POTASSIUM SERPL-SCNC: 4.6 MMOL/L (ref 3.4–5.3)
PROT SERPL-MCNC: 7.1 G/DL (ref 6.4–8.3)
RBC # BLD AUTO: 5.02 10E6/UL (ref 4.4–5.9)
RBC URINE: 0 /HPF
SODIUM SERPL-SCNC: 139 MMOL/L (ref 135–145)
SP GR UR STRIP: 1.01 (ref 1–1.03)
SQUAMOUS EPITHELIAL: <1 /HPF
UROBILINOGEN UR STRIP-MCNC: NORMAL MG/DL
WBC # BLD AUTO: 9.3 10E3/UL (ref 4–11)
WBC URINE: 0 /HPF

## 2024-08-03 PROCEDURE — 99284 EMERGENCY DEPT VISIT MOD MDM: CPT | Mod: 25 | Performed by: EMERGENCY MEDICINE

## 2024-08-03 PROCEDURE — 85025 COMPLETE CBC W/AUTO DIFF WBC: CPT | Performed by: EMERGENCY MEDICINE

## 2024-08-03 PROCEDURE — 74177 CT ABD & PELVIS W/CONTRAST: CPT | Mod: 26 | Performed by: STUDENT IN AN ORGANIZED HEALTH CARE EDUCATION/TRAINING PROGRAM

## 2024-08-03 PROCEDURE — 250N000011 HC RX IP 250 OP 636: Performed by: EMERGENCY MEDICINE

## 2024-08-03 PROCEDURE — 81001 URINALYSIS AUTO W/SCOPE: CPT | Performed by: EMERGENCY MEDICINE

## 2024-08-03 PROCEDURE — 83605 ASSAY OF LACTIC ACID: CPT | Performed by: EMERGENCY MEDICINE

## 2024-08-03 PROCEDURE — 36415 COLL VENOUS BLD VENIPUNCTURE: CPT | Performed by: EMERGENCY MEDICINE

## 2024-08-03 PROCEDURE — 80053 COMPREHEN METABOLIC PANEL: CPT | Performed by: EMERGENCY MEDICINE

## 2024-08-03 PROCEDURE — 99284 EMERGENCY DEPT VISIT MOD MDM: CPT | Performed by: EMERGENCY MEDICINE

## 2024-08-03 PROCEDURE — 74177 CT ABD & PELVIS W/CONTRAST: CPT

## 2024-08-03 RX ORDER — METRONIDAZOLE 500 MG/1
500 TABLET ORAL 2 TIMES DAILY
Qty: 10 TABLET | Refills: 0 | Status: SHIPPED | OUTPATIENT
Start: 2024-08-03 | End: 2024-08-08

## 2024-08-03 RX ORDER — IOPAMIDOL 755 MG/ML
135 INJECTION, SOLUTION INTRAVASCULAR ONCE
Status: COMPLETED | OUTPATIENT
Start: 2024-08-03 | End: 2024-08-03

## 2024-08-03 RX ORDER — CIPROFLOXACIN 500 MG/1
500 TABLET, FILM COATED ORAL 2 TIMES DAILY
Qty: 6 TABLET | Refills: 0 | Status: SHIPPED | OUTPATIENT
Start: 2024-08-03 | End: 2024-08-08

## 2024-08-03 RX ADMIN — IOPAMIDOL 135 ML: 755 INJECTION, SOLUTION INTRAVENOUS at 13:43

## 2024-08-03 ASSESSMENT — COLUMBIA-SUICIDE SEVERITY RATING SCALE - C-SSRS
6. HAVE YOU EVER DONE ANYTHING, STARTED TO DO ANYTHING, OR PREPARED TO DO ANYTHING TO END YOUR LIFE?: NO
2. HAVE YOU ACTUALLY HAD ANY THOUGHTS OF KILLING YOURSELF IN THE PAST MONTH?: NO
1. IN THE PAST MONTH, HAVE YOU WISHED YOU WERE DEAD OR WISHED YOU COULD GO TO SLEEP AND NOT WAKE UP?: NO

## 2024-08-03 ASSESSMENT — ACTIVITIES OF DAILY LIVING (ADL)
ADLS_ACUITY_SCORE: 35

## 2024-08-03 NOTE — DISCHARGE INSTRUCTIONS
Please make an appointment to follow up with Your Primary Care Provider as you have planned to discuss your colonoscopy.    If you develop fevers, worsening pain or if you have any further concerns return to the emergency department

## 2024-08-03 NOTE — ED TRIAGE NOTES
Patient presents with lower abd pain that started Wednesday morning, patient has a history of diverticulitis and this is a typical presentation for him.  No nausea, vomiting, diarrhea, fevers or chills.  Last seen about a month ago for same.     Triage Assessment (Adult)       Row Name 08/03/24 1050          Triage Assessment    Airway WDL WDL        Respiratory WDL    Respiratory WDL WDL        Skin Circulation/Temperature WDL    Skin Circulation/Temperature WDL WDL        Cardiac WDL    Cardiac WDL WDL        Peripheral/Neurovascular WDL    Peripheral Neurovascular WDL WDL        Cognitive/Neuro/Behavioral WDL    Cognitive/Neuro/Behavioral WDL WDL

## 2024-08-03 NOTE — ED PROVIDER NOTES
[place] EMERGENCY DEPARTMENT ([facility])    8/03/24       ED PROVIDER NOTE  History     Chief Complaint   Patient presents with    Abdominal Pain     The history is provided by the patient and medical records.     Bella Quintero is a 48 year old male with a past medical history of diverticulitis of colon, acute respiratory failure with hypoxia, and aspiration pneumonitis who presents to the emergency department for abdominal pain. Patient reports LLQ abdominal pain since Wednesday morning. His abdomen feels bloated and as if there is a lot of pressure in the LLQ of abdomen. He has had a light appetite due to the pain and notes a difficulty of bowel movements. He states he was previously admitted for diverticulitis earlier this month and current abdominal pain feels similar. However, current abdominal pain is lower than previous abdominal pain. He was last on antibiotics a few weeks ago and states symptoms went away when he was on amoxicillin. Denies fevers, chills, body aches, nausea, vomiting, urinary symptoms, and diarrhea.     Per chart review, patient was admitted from 5/1/24 - 5/3/24 and 7/12/24 - 7/13/24 for abdominal pain related to diverticulitis with perforation. He has a upcoming colonoscopy scheduled on 10/18/24.     CT ABDOMEN PELVIS W CONTRAST 7/12/2024  Impression:   1. Eccentric appearing mural thickening with luminal narrowing, in metabolic enhancement and pericolonic streakiness involving 10 cm segment of sigmoid colon, concerning for possible colitis/diverticulitis with few adjacent prominent mesocolic nodes. Recommend posttreatment colonoscopy to rule out underlying neoplastic lesion  2. Confluent thickening right lateral to the thickened sigmoid colon along the lateral pelvic fascia possibly phlegmon; no drainable fluid collection.  3. Few prominent retroperitoneal lymph nodes, as above, consider attention on follow-up.  4. Lucency along the anterosuperior L2 vertebral  body, indeterminate, possible demineralization recommend correlation with prior imaging if available and/or attention on follow-up as clinically desired.  5. Borderline splenomegaly.     Past Medical History  Past Medical History:   Diagnosis Date    Bilateral low back pain with right-sided sciatica     Diverticulitis of colon 05/01/2024    History of alcohol use disorder     Tobacco use disorder      No past surgical history on file.  amoxicillin-clavulanate (AUGMENTIN) 875-125 MG tablet  atorvastatin (LIPITOR) 10 MG tablet  atorvastatin (LIPITOR) 10 MG tablet  ketoconazole (NIZORAL) 2 % external shampoo      Allergies   Allergen Reactions    Codeine Swelling and Itching     PN: LW Reaction: EDEMA, GENERALIZED   Tolerated hydromorphone 5/1/24    Penicillins Other (See Comments)     Swelling, burning feeling in hands and feet. , PN: LW Reaction: EDEMA, GENERALIZED     Family History  Family History   Problem Relation Age of Onset    Colon Polyps Mother     Alcoholism Father     Diabetes Father     Colon Cancer No family hx of      Social History   Social History     Tobacco Use    Smoking status: Every Day     Current packs/day: 1.00     Average packs/day: 1 pack/day for 30.1 years (30.1 ttl pk-yrs)     Types: Cigarettes     Start date: 7/9/1994    Smokeless tobacco: Never    Tobacco comments:     Revisit as pt is willing   Substance Use Topics    Alcohol use: Yes     Alcohol/week: 15.0 standard drinks of alcohol     Types: 15 Glasses of wine per week     Comment: 3 bottles of wine per week. H/o 1L EtOH every other day, self decreased several years ago.    Drug use: Yes     Types: Marijuana      Past medical history, past surgical history, medications, allergies, family history, and social history were reviewed with the patient. No additional pertinent items.   ROS: 14 point ROS neg other than the symptoms noted above in the HPI.     Physical Exam   BP: 117/77  Pulse: 75  Temp: 98.3  F (36.8  C)  Resp: 18  SpO2: 97  %  Physical Exam  Physical Exam   Constitutional: oriented to person, place, and time. appears well-developed and well-nourished.   HENT:   Head: Normocephalic and atraumatic.   Neck: Normal range of motion.   Pulmonary/Chest: Effort normal. No respiratory distress.   Cardiac: No murmurs, rubs, gallops. RRR.  Abdominal: Abdomen soft, nontender, nondistended. No rebound tenderness.  MSK: Long bones without deformity or evidence of trauma  Neurological: alert and oriented to person, place, and time.   Skin: Skin is warm and dry.   Psychiatric:  normal mood and affect.  behavior is normal. Thought content normal.    ED Course, Procedures, & Data      Procedures          Results for orders placed or performed during the hospital encounter of 08/03/24   CT Abdomen Pelvis w Contrast     Status: None (Preliminary result)    Impression    RESIDENT PRELIMINARY INTERPRETATION  IMPRESSION:   1. Continued eccentric wall thickening in the sigmoid colon compared  to 7/12/2024. No drainable fluid collection. Findings could represent  ongoing sigmoid diverticulitis/colitis, however findings remain  concerning for potential neoplasia. Posttreatment colonoscopy is  recommended.  2. Unchanged prominent pelvic and portacaval lymph nodes.   Comprehensive metabolic panel     Status: Abnormal   Result Value Ref Range    Sodium 139 135 - 145 mmol/L    Potassium 4.6 3.4 - 5.3 mmol/L    Carbon Dioxide (CO2) 26 22 - 29 mmol/L    Anion Gap 9 7 - 15 mmol/L    Urea Nitrogen 11.6 6.0 - 20.0 mg/dL    Creatinine 0.73 0.67 - 1.17 mg/dL    GFR Estimate >90 >60 mL/min/1.73m2    Calcium 8.8 8.8 - 10.4 mg/dL    Chloride 104 98 - 107 mmol/L    Glucose 101 (H) 70 - 99 mg/dL    Alkaline Phosphatase 96 40 - 150 U/L    AST 13 0 - 45 U/L    ALT 11 0 - 70 U/L    Protein Total 7.1 6.4 - 8.3 g/dL    Albumin 4.1 3.5 - 5.2 g/dL    Bilirubin Total 0.4 <=1.2 mg/dL   UA with Microscopic reflex to Culture     Status: Abnormal    Specimen: Urine, Midstream   Result  Value Ref Range    Color Urine Light Yellow Colorless, Straw, Light Yellow, Yellow    Appearance Urine Clear Clear    Glucose Urine Negative Negative mg/dL    Bilirubin Urine Negative Negative    Ketones Urine Negative Negative mg/dL    Specific Gravity Urine 1.011 1.003 - 1.035    Blood Urine Negative Negative    pH Urine 6.5 5.0 - 7.0    Protein Albumin Urine Negative Negative mg/dL    Urobilinogen Urine Normal Normal, 2.0 mg/dL    Nitrite Urine Negative Negative    Leukocyte Esterase Urine Negative Negative    Mucus Urine Present (A) None Seen /LPF    RBC Urine 0 <=2 /HPF    WBC Urine 0 <=5 /HPF    Squamous Epithelials Urine <1 <=1 /HPF    Narrative    Urine Culture not indicated   Lactic acid whole blood with 1x repeat in 2 hr when >2     Status: Normal   Result Value Ref Range    Lactic Acid, Initial 1.1 0.7 - 2.0 mmol/L   CBC with platelets and differential     Status: None   Result Value Ref Range    WBC Count 9.3 4.0 - 11.0 10e3/uL    RBC Count 5.02 4.40 - 5.90 10e6/uL    Hemoglobin 15.7 13.3 - 17.7 g/dL    Hematocrit 47.9 40.0 - 53.0 %    MCV 95 78 - 100 fL    MCH 31.3 26.5 - 33.0 pg    MCHC 32.8 31.5 - 36.5 g/dL    RDW 14.8 10.0 - 15.0 %    Platelet Count 302 150 - 450 10e3/uL    % Neutrophils 62 %    % Lymphocytes 24 %    % Monocytes 8 %    % Eosinophils 4 %    % Basophils 1 %    % Immature Granulocytes 1 %    NRBCs per 100 WBC 0 <1 /100    Absolute Neutrophils 5.8 1.6 - 8.3 10e3/uL    Absolute Lymphocytes 2.2 0.8 - 5.3 10e3/uL    Absolute Monocytes 0.7 0.0 - 1.3 10e3/uL    Absolute Eosinophils 0.3 0.0 - 0.7 10e3/uL    Absolute Basophils 0.1 0.0 - 0.2 10e3/uL    Absolute Immature Granulocytes 0.1 <=0.4 10e3/uL    Absolute NRBCs 0.0 10e3/uL   CBC with platelets differential     Status: None    Narrative    The following orders were created for panel order CBC with platelets differential.  Procedure                               Abnormality         Status                     ---------                                -----------         ------                     CBC with platelets and d...[[id number]]                      Final result                 Please view results for these tests on the individual orders.     Medications - No data to display  Labs Ordered and Resulted from Time of ED Arrival to Time of ED Departure - No data to display  No orders to display          Critical care was not performed.     Medical Decision Making  The patient's presentation was of moderate complexity (a chronic illness mild to moderate exacerbation, progression, or side effect of treatment).    The patient's evaluation involved:  review of external note(s) from 1 sources (hospital discharge summary from 7/12/2024)  ordering and/or review of 3+ test(s) in this encounter (see separate area of note for details)  independent interpretation of testing performed by another health professional (CT abdomen with questionable diverticulitis.)    The patient's management necessitated moderate risk (prescription drug management including medications given in the ED).    Assessment & Plan    MDM  Patient is in with abdominal pain consistent with prior episodes of diverticulitis.  His white blood count is not elevated.  He is afebrile.  He does have questionable findings on his CT scan.  Discussed getting some antibiotics and to observe if he feels that this would make him feel better consistent with prior episodes.  Also discussed that this may be a mass.  He does have colonoscopy scheduled.  He will contact his primary care provider to discuss moving up his colonoscopy.  Patient agrees with plan.    I have reviewed the nursing notes. I have reviewed the findings, diagnosis, plan and need for follow up with the patient.    New Prescriptions    No medications on file       Final diagnoses:   Generalized abdominal pain   IBritney, am serving as a trained medical scribe to document services personally performed by Austin Hayes MD, based  on the provider's statements to me.     I, Austin Hayes MD, was physically present and have reviewed and verified the accuracy of this note documented by Britney Jarrell.     Austin Hayes MD   [facility] EMERGENCY DEPARTMENT  8/3/2024     Austin Hayes MD  08/03/24 1512

## 2024-08-23 ENCOUNTER — HOSPITAL ENCOUNTER (EMERGENCY)
Facility: CLINIC | Age: 49
Discharge: HOME | End: 2024-08-23
Attending: EMERGENCY MEDICINE | Admitting: EMERGENCY MEDICINE
Payer: COMMERCIAL

## 2024-08-23 ENCOUNTER — TRANSCRIBE ORDERS (OUTPATIENT)
Dept: OTHER | Age: 49
End: 2024-08-23

## 2024-08-23 VITALS
WEIGHT: 232 LBS | HEIGHT: 67 IN | DIASTOLIC BLOOD PRESSURE: 89 MMHG | RESPIRATION RATE: 20 BRPM | HEART RATE: 57 BPM | OXYGEN SATURATION: 96 % | SYSTOLIC BLOOD PRESSURE: 127 MMHG | TEMPERATURE: 97.7 F | BODY MASS INDEX: 36.41 KG/M2

## 2024-08-23 DIAGNOSIS — K52.9 ACUTE COLITIS: ICD-10-CM

## 2024-08-23 DIAGNOSIS — R10.32 LEFT LOWER QUADRANT ABDOMINAL PAIN: ICD-10-CM

## 2024-08-23 DIAGNOSIS — K63.9 SIGMOID THICKENING: Primary | ICD-10-CM

## 2024-08-23 LAB
ALBUMIN SERPL BCG-MCNC: 4.1 G/DL (ref 3.5–5.2)
ALBUMIN UR-MCNC: 10 MG/DL
ALP SERPL-CCNC: 122 U/L (ref 40–150)
ALT SERPL W P-5'-P-CCNC: 17 U/L (ref 0–70)
ANION GAP SERPL CALCULATED.3IONS-SCNC: 9 MMOL/L (ref 7–15)
APPEARANCE UR: CLEAR
AST SERPL W P-5'-P-CCNC: 14 U/L (ref 0–45)
BASOPHILS # BLD AUTO: 0.1 10E3/UL (ref 0–0.2)
BASOPHILS NFR BLD AUTO: 1 %
BILIRUB SERPL-MCNC: 0.5 MG/DL
BILIRUB UR QL STRIP: NEGATIVE
BUN SERPL-MCNC: 12.2 MG/DL (ref 6–20)
CALCIUM SERPL-MCNC: 8.6 MG/DL (ref 8.8–10.4)
CHLORIDE SERPL-SCNC: 104 MMOL/L (ref 98–107)
COLOR UR AUTO: YELLOW
CREAT SERPL-MCNC: 0.79 MG/DL (ref 0.67–1.17)
EGFRCR SERPLBLD CKD-EPI 2021: >90 ML/MIN/1.73M2
EOSINOPHIL # BLD AUTO: 0.4 10E3/UL (ref 0–0.7)
EOSINOPHIL NFR BLD AUTO: 4 %
ERYTHROCYTE [DISTWIDTH] IN BLOOD BY AUTOMATED COUNT: 14 % (ref 10–15)
GLUCOSE SERPL-MCNC: 122 MG/DL (ref 70–99)
GLUCOSE UR STRIP-MCNC: NEGATIVE MG/DL
HCO3 SERPL-SCNC: 26 MMOL/L (ref 22–29)
HCT VFR BLD AUTO: 47.2 % (ref 40–53)
HGB BLD-MCNC: 15.4 G/DL (ref 13.3–17.7)
HGB UR QL STRIP: NEGATIVE
IMM GRANULOCYTES # BLD: 0.1 10E3/UL
IMM GRANULOCYTES NFR BLD: 1 %
KETONES UR STRIP-MCNC: NEGATIVE MG/DL
LACTATE SERPL-SCNC: 1.2 MMOL/L (ref 0.7–2)
LEUKOCYTE ESTERASE UR QL STRIP: NEGATIVE
LIPASE SERPL-CCNC: 19 U/L (ref 13–60)
LYMPHOCYTES # BLD AUTO: 1.8 10E3/UL (ref 0.8–5.3)
LYMPHOCYTES NFR BLD AUTO: 15 %
MCH RBC QN AUTO: 31.3 PG (ref 26.5–33)
MCHC RBC AUTO-ENTMCNC: 32.6 G/DL (ref 31.5–36.5)
MCV RBC AUTO: 96 FL (ref 78–100)
MONOCYTES # BLD AUTO: 1 10E3/UL (ref 0–1.3)
MONOCYTES NFR BLD AUTO: 8 %
MUCOUS THREADS #/AREA URNS LPF: PRESENT /LPF
NEUTROPHILS # BLD AUTO: 8.4 10E3/UL (ref 1.6–8.3)
NEUTROPHILS NFR BLD AUTO: 71 %
NITRATE UR QL: NEGATIVE
NRBC # BLD AUTO: 0 10E3/UL
NRBC BLD AUTO-RTO: 0 /100
PH UR STRIP: 6 [PH] (ref 5–7)
PLATELET # BLD AUTO: 253 10E3/UL (ref 150–450)
POTASSIUM SERPL-SCNC: 4.5 MMOL/L (ref 3.4–5.3)
PROT SERPL-MCNC: 6.9 G/DL (ref 6.4–8.3)
RBC # BLD AUTO: 4.92 10E6/UL (ref 4.4–5.9)
RBC URINE: 0 /HPF
SODIUM SERPL-SCNC: 139 MMOL/L (ref 135–145)
SP GR UR STRIP: 1.02 (ref 1–1.03)
UROBILINOGEN UR STRIP-MCNC: NORMAL MG/DL
WBC # BLD AUTO: 11.7 10E3/UL (ref 4–11)
WBC URINE: 0 /HPF

## 2024-08-23 PROCEDURE — 99284 EMERGENCY DEPT VISIT MOD MDM: CPT | Mod: 25 | Performed by: EMERGENCY MEDICINE

## 2024-08-23 PROCEDURE — 36415 COLL VENOUS BLD VENIPUNCTURE: CPT | Performed by: EMERGENCY MEDICINE

## 2024-08-23 PROCEDURE — 99284 EMERGENCY DEPT VISIT MOD MDM: CPT | Performed by: EMERGENCY MEDICINE

## 2024-08-23 PROCEDURE — 85041 AUTOMATED RBC COUNT: CPT | Performed by: EMERGENCY MEDICINE

## 2024-08-23 PROCEDURE — 250N000011 HC RX IP 250 OP 636: Performed by: EMERGENCY MEDICINE

## 2024-08-23 PROCEDURE — 250N000013 HC RX MED GY IP 250 OP 250 PS 637: Performed by: EMERGENCY MEDICINE

## 2024-08-23 PROCEDURE — 96361 HYDRATE IV INFUSION ADD-ON: CPT | Performed by: EMERGENCY MEDICINE

## 2024-08-23 PROCEDURE — 81001 URINALYSIS AUTO W/SCOPE: CPT | Performed by: EMERGENCY MEDICINE

## 2024-08-23 PROCEDURE — 83605 ASSAY OF LACTIC ACID: CPT | Performed by: EMERGENCY MEDICINE

## 2024-08-23 PROCEDURE — 96374 THER/PROPH/DIAG INJ IV PUSH: CPT | Performed by: EMERGENCY MEDICINE

## 2024-08-23 PROCEDURE — 83690 ASSAY OF LIPASE: CPT | Performed by: EMERGENCY MEDICINE

## 2024-08-23 PROCEDURE — 80053 COMPREHEN METABOLIC PANEL: CPT | Performed by: EMERGENCY MEDICINE

## 2024-08-23 PROCEDURE — 96375 TX/PRO/DX INJ NEW DRUG ADDON: CPT | Performed by: EMERGENCY MEDICINE

## 2024-08-23 PROCEDURE — 258N000003 HC RX IP 258 OP 636: Performed by: EMERGENCY MEDICINE

## 2024-08-23 RX ORDER — CIPROFLOXACIN 500 MG/1
500 TABLET, FILM COATED ORAL ONCE
Status: COMPLETED | OUTPATIENT
Start: 2024-08-23 | End: 2024-08-23

## 2024-08-23 RX ORDER — METRONIDAZOLE 500 MG/1
500 TABLET ORAL 3 TIMES DAILY
Qty: 30 TABLET | Refills: 0 | Status: SHIPPED | OUTPATIENT
Start: 2024-08-23 | End: 2024-09-02

## 2024-08-23 RX ORDER — ONDANSETRON 2 MG/ML
4 INJECTION INTRAMUSCULAR; INTRAVENOUS ONCE
Status: COMPLETED | OUTPATIENT
Start: 2024-08-23 | End: 2024-08-23

## 2024-08-23 RX ORDER — KETOROLAC TROMETHAMINE 15 MG/ML
10 INJECTION, SOLUTION INTRAMUSCULAR; INTRAVENOUS ONCE
Status: COMPLETED | OUTPATIENT
Start: 2024-08-23 | End: 2024-08-23

## 2024-08-23 RX ORDER — METRONIDAZOLE 500 MG/1
500 TABLET ORAL ONCE
Status: COMPLETED | OUTPATIENT
Start: 2024-08-23 | End: 2024-08-23

## 2024-08-23 RX ORDER — CIPROFLOXACIN 500 MG/1
500 TABLET, FILM COATED ORAL 2 TIMES DAILY
Qty: 20 TABLET | Refills: 0 | Status: SHIPPED | OUTPATIENT
Start: 2024-08-23 | End: 2024-09-02

## 2024-08-23 RX ORDER — ACETAMINOPHEN 500 MG
1000 TABLET ORAL ONCE
Status: COMPLETED | OUTPATIENT
Start: 2024-08-23 | End: 2024-08-23

## 2024-08-23 RX ADMIN — METRONIDAZOLE 500 MG: 500 TABLET ORAL at 12:40

## 2024-08-23 RX ADMIN — CIPROFLOXACIN 500 MG: 500 TABLET, COATED ORAL at 12:40

## 2024-08-23 RX ADMIN — KETOROLAC TROMETHAMINE 10 MG: 15 INJECTION, SOLUTION INTRAMUSCULAR; INTRAVENOUS at 12:29

## 2024-08-23 RX ADMIN — SODIUM CHLORIDE, POTASSIUM CHLORIDE, SODIUM LACTATE AND CALCIUM CHLORIDE 1000 ML: 600; 310; 30; 20 INJECTION, SOLUTION INTRAVENOUS at 12:38

## 2024-08-23 RX ADMIN — ONDANSETRON 4 MG: 2 INJECTION INTRAMUSCULAR; INTRAVENOUS at 12:28

## 2024-08-23 RX ADMIN — ACETAMINOPHEN 1000 MG: 500 TABLET ORAL at 12:29

## 2024-08-23 ASSESSMENT — ACTIVITIES OF DAILY LIVING (ADL)
ADLS_ACUITY_SCORE: 33
ADLS_ACUITY_SCORE: 35

## 2024-08-23 ASSESSMENT — COLUMBIA-SUICIDE SEVERITY RATING SCALE - C-SSRS
2. HAVE YOU ACTUALLY HAD ANY THOUGHTS OF KILLING YOURSELF IN THE PAST MONTH?: NO
6. HAVE YOU EVER DONE ANYTHING, STARTED TO DO ANYTHING, OR PREPARED TO DO ANYTHING TO END YOUR LIFE?: NO
1. IN THE PAST MONTH, HAVE YOU WISHED YOU WERE DEAD OR WISHED YOU COULD GO TO SLEEP AND NOT WAKE UP?: NO

## 2024-08-23 NOTE — ED TRIAGE NOTES
"Triage Assessment & Note:    /86   Pulse 84   Temp 97.8  F (36.6  C) (Oral)   Resp 16   Ht 1.702 m (5' 7\")   Wt 105.2 kg (232 lb)   SpO2 97%   BMI 36.34 kg/m        Patient presents with: Pt with hx of diverticulitis comes ambulatory to triage with increase abdominal pain/ cramping. No reports of fever, cough, SOB, CP, or travel.    Home Treatments/Remedies: Home medications    Febrile / Afebrile: afebrile    Duration of C/o: 24+ hrs    Adrienne Karimi RN  August 23, 2024              "

## 2024-08-23 NOTE — ED PROVIDER NOTES
"    [place] EMERGENCY DEPARTMENT ([facility])    8/23/24       ED PROVIDER NOTE     History     Chief Complaint   Patient presents with    Abdominal Pain     The history is provided by the patient and medical records.     Bella Quintero is a 48 year old male with history of diverticulitis in May 2024 complicated by perforation and peridiverticular phlegmon who presents to the emergency department with 2 days of worsening left lower quadrant pain which reminds him of prior diverticulitis flares which have been demonstrated on CT. No fever, chills, blood in stool, or vomiting.     Epic records reviewed. He was recently on Medicine observation from 7/12-7/13/2024 for acute left lower quadrant abdominal pain, hematochezia with diverticulitis.CT demonstrated eccentric mural thickening with luminal narrowing involving 10 cm segment of sigmoid colon, few adjacent prominent mesocolic nodes, confluence thickening lateral to thickened sigmoid colon with no drainable fluid collection. Pain resolved the following morning and he was tolerating a regular diet. Started on IV antibiotics and discharged on Augmentin to complete a 10-day course.  He was discharged with instructions to have a colonoscopy after completion of antibiotic course.      Physical Exam   BP: 127/86  Pulse: 84  Temp: 97.8  F (36.6  C)  Resp: 16  Height: 170.2 cm (5' 7\")  Weight: 105.2 kg (232 lb)  SpO2: 97 %  Physical Exam    Overall well-appearing. No tenderness in right upper or lower quadrants, or left upper quadrant. Mild tenderness to deep palpation of the left lower quadrant. No CVA tenderness     ED Course, Procedures, & Data      Procedures            Results for orders placed or performed during the hospital encounter of 08/23/24   Comprehensive metabolic panel     Status: Abnormal   Result Value Ref Range    Sodium 139 135 - 145 mmol/L    Potassium 4.5 3.4 - 5.3 mmol/L    Carbon Dioxide (CO2) 26 22 - 29 mmol/L    Anion Gap 9 7 - 15 " mmol/L    Urea Nitrogen 12.2 6.0 - 20.0 mg/dL    Creatinine 0.79 0.67 - 1.17 mg/dL    GFR Estimate >90 >60 mL/min/1.73m2    Calcium 8.6 (L) 8.8 - 10.4 mg/dL    Chloride 104 98 - 107 mmol/L    Glucose 122 (H) 70 - 99 mg/dL    Alkaline Phosphatase 122 40 - 150 U/L    AST 14 0 - 45 U/L    ALT 17 0 - 70 U/L    Protein Total 6.9 6.4 - 8.3 g/dL    Albumin 4.1 3.5 - 5.2 g/dL    Bilirubin Total 0.5 <=1.2 mg/dL   Lactic acid whole blood with 1x repeat in 2 hr when >2     Status: Normal   Result Value Ref Range    Lactic Acid, Initial 1.2 0.7 - 2.0 mmol/L   Lipase     Status: Normal   Result Value Ref Range    Lipase 19 13 - 60 U/L   UA with Microscopic reflex to Culture     Status: Abnormal    Specimen: Urine, Clean Catch   Result Value Ref Range    Color Urine Yellow Colorless, Straw, Light Yellow, Yellow    Appearance Urine Clear Clear    Glucose Urine Negative Negative mg/dL    Bilirubin Urine Negative Negative    Ketones Urine Negative Negative mg/dL    Specific Gravity Urine 1.025 1.003 - 1.035    Blood Urine Negative Negative    pH Urine 6.0 5.0 - 7.0    Protein Albumin Urine 10 (A) Negative mg/dL    Urobilinogen Urine Normal Normal, 2.0 mg/dL    Nitrite Urine Negative Negative    Leukocyte Esterase Urine Negative Negative    Mucus Urine Present (A) None Seen /LPF    RBC Urine 0 <=2 /HPF    WBC Urine 0 <=5 /HPF    Narrative    Urine Culture not indicated   CBC with platelets and differential     Status: Abnormal   Result Value Ref Range    WBC Count 11.7 (H) 4.0 - 11.0 10e3/uL    RBC Count 4.92 4.40 - 5.90 10e6/uL    Hemoglobin 15.4 13.3 - 17.7 g/dL    Hematocrit 47.2 40.0 - 53.0 %    MCV 96 78 - 100 fL    MCH 31.3 26.5 - 33.0 pg    MCHC 32.6 31.5 - 36.5 g/dL    RDW 14.0 10.0 - 15.0 %    Platelet Count 253 150 - 450 10e3/uL    % Neutrophils 71 %    % Lymphocytes 15 %    % Monocytes 8 %    % Eosinophils 4 %    % Basophils 1 %    % Immature Granulocytes 1 %    NRBCs per 100 WBC 0 <1 /100    Absolute Neutrophils 8.4 (H)  1.6 - 8.3 10e3/uL    Absolute Lymphocytes 1.8 0.8 - 5.3 10e3/uL    Absolute Monocytes 1.0 0.0 - 1.3 10e3/uL    Absolute Eosinophils 0.4 0.0 - 0.7 10e3/uL    Absolute Basophils 0.1 0.0 - 0.2 10e3/uL    Absolute Immature Granulocytes 0.1 <=0.4 10e3/uL    Absolute NRBCs 0.0 10e3/uL   CBC with platelets differential     Status: Abnormal    Narrative    The following orders were created for panel order CBC with platelets differential.  Procedure                               Abnormality         Status                     ---------                               -----------         ------                     CBC with platelets and d...[[id number]]  Abnormal            Final result                 Please view results for these tests on the individual orders.     Medications   lactated ringers BOLUS 1,000 mL (0 mLs Intravenous Stopped 8/23/24 1323)   ondansetron (ZOFRAN) injection 4 mg (4 mg Intravenous $Given 8/23/24 1228)   acetaminophen (TYLENOL) tablet 1,000 mg (1,000 mg Oral $Given 8/23/24 1229)   ketorolac (TORADOL) injection 10 mg (10 mg Intravenous $Given 8/23/24 1229)   metroNIDAZOLE (FLAGYL) tablet 500 mg (500 mg Oral $Given 8/23/24 1240)   ciprofloxacin (CIPRO) tablet 500 mg (500 mg Oral $Given 8/23/24 1240)     Labs Ordered and Resulted from Time of ED Arrival to Time of ED Departure   COMPREHENSIVE METABOLIC PANEL - Abnormal       Result Value    Sodium 139      Potassium 4.5      Carbon Dioxide (CO2) 26      Anion Gap 9      Urea Nitrogen 12.2      Creatinine 0.79      GFR Estimate >90      Calcium 8.6 (*)     Chloride 104      Glucose 122 (*)     Alkaline Phosphatase 122      AST 14      ALT 17      Protein Total 6.9      Albumin 4.1      Bilirubin Total 0.5     ROUTINE UA WITH MICROSCOPIC REFLEX TO CULTURE - Abnormal    Color Urine Yellow      Appearance Urine Clear      Glucose Urine Negative      Bilirubin Urine Negative      Ketones Urine Negative      Specific Gravity Urine 1.025      Blood Urine Negative       pH Urine 6.0      Protein Albumin Urine 10 (*)     Urobilinogen Urine Normal      Nitrite Urine Negative      Leukocyte Esterase Urine Negative      Mucus Urine Present (*)     RBC Urine 0      WBC Urine 0     CBC WITH PLATELETS AND DIFFERENTIAL - Abnormal    WBC Count 11.7 (*)     RBC Count 4.92      Hemoglobin 15.4      Hematocrit 47.2      MCV 96      MCH 31.3      MCHC 32.6      RDW 14.0      Platelet Count 253      % Neutrophils 71      % Lymphocytes 15      % Monocytes 8      % Eosinophils 4      % Basophils 1      % Immature Granulocytes 1      NRBCs per 100 WBC 0      Absolute Neutrophils 8.4 (*)     Absolute Lymphocytes 1.8      Absolute Monocytes 1.0      Absolute Eosinophils 0.4      Absolute Basophils 0.1      Absolute Immature Granulocytes 0.1      Absolute NRBCs 0.0     LACTIC ACID WHOLE BLOOD WITH 1X REPEAT IN 2 HR WHEN >2 - Normal    Lactic Acid, Initial 1.2     LIPASE - Normal    Lipase 19       No orders to display          Critical care was not performed.     Medical Decision Making  The patient's presentation was of high complexity (a chronic illness severe exacerbation, progression, or side effect of treatment).    The patient's evaluation involved:  review of external note(s) from 1 sources (prior hospital admission discharge summary)  review of 1 test result(s) ordered prior to this encounter (see separate area of note for details)  strong consideration of a test (CT abdomen pelvis) that was ultimately deferred  ordering and/or review of 2 test(s) in this encounter (see separate area of note for details)    The patient's management necessitated moderate risk (prescription drug management including medications given in the ED).    Assessment & Plan      History and physical exam consistent with third flare of diverticulitis in the past 5 months. Last antibiotic course was Augmentin. Will treat pain and nausea and start patient on Cipro and Flagyl. I reviewed the past CT imaging from  8/3/2024 showing potential concern for neoplasm in sigmoid colon, superimposed infection is also reported today with early return of symptoms.   I strongly considered a repeat CT abdomen pelvis and discussed with the patient the rationale of not repeating this for a third time 5 months.     We will place on Cipro and Flagyl and will refer to Surgery/Surgical Oncology for assessment of other intervention as patient has colonoscopy with Gastroenterology in 1 to 2 months.     Prior to discharge, I reviewed patient's lab test showing no signs of severe electrolyte abnormality or organ failure.  Mildly elevated white count which is consistent with diverticulitis.  Urinalysis without blood    This part of the document was transcribed by Chana Ratliff, Medical Scribe.      I have reviewed the nursing notes. I have reviewed the findings, diagnosis, plan and need for follow up with the patient.    Discharge Medication List as of 8/23/2024  1:32 PM        START taking these medications    Details   ciprofloxacin (CIPRO) 500 MG tablet Take 1 tablet (500 mg) by mouth 2 times daily for 10 days., Disp-20 tablet, R-0, E-Prescribe      metroNIDAZOLE (FLAGYL) 500 MG tablet Take 1 tablet (500 mg) by mouth 3 times daily for 10 days., Disp-30 tablet, R-0, E-PrescribeEat yogurt or cottage cheese daily to prevent diarrhea that can be caused by taking this medication.             Final diagnoses:   Acute colitis   Left lower quadrant abdominal pain       Sterling Christy MD     [facility] EMERGENCY DEPARTMENT  8/23/2024        Sterling Christy MD  08/23/24 3433

## 2024-08-23 NOTE — DISCHARGE INSTRUCTIONS
A referral has been placed for surgical oncology to meet with you in an office setting.  Your CT scan from August 4 shows a potential neoplasm   Until then, please take 10 days of antibiotics as prescribed.   For pain, please take 975-1000mg acetaminophen (tylenol) every 8 hours -- do not take more than 3000mg in a 24 hour period.  You can also take 600mg ibuprofen (motrin/advil) every 6 hours for pain  Separately, please call today to schedule a follow-up appointment with your primary medical doctor in 3-5 days for reevaluation, which is important after today's emergency department visit.   Please return to emergency department for fever>104F, persistent vomiting, worsening chest pain, shortness of breath

## 2024-08-26 ENCOUNTER — TELEPHONE (OUTPATIENT)
Dept: SURGERY | Facility: CLINIC | Age: 49
End: 2024-08-26
Payer: COMMERCIAL

## 2024-08-26 NOTE — TELEPHONE ENCOUNTER
[facility]    Phone Message    May a detailed message be left on voicemail: yes     Reason for Call: Appointment Intake    Referring Provider Name: Sterling Crhisty MD  Diagnosis and/or Symptoms: Sigmoid thickening [K63.9]    Patient is being referred for sigmoid thickening. Dx is not in guidelines. Please review and follow up with patient. Thanks!     Action Taken: Message routed to:  [facility]    Travel Screening: Not Applicable     Date of Service:

## 2024-08-26 NOTE — TELEPHONE ENCOUNTER
CT Abdomen/Pelvis:    8/2:  IMPRESSION:   1. Continued eccentric wall thickening in the sigmoid colon compared  to 7/12/2024. No drainable fluid collection. Findings are concerning  for neoplasm. Superimposed infection is slightly improved..  2. Unchanged prominent pelvic and portacaval lymph nodes.    7/12:  1. Eccentric appearing mural thickening with luminal narrowing, in  metabolic enhancement and pericolonic streakiness involving 10 cm  segment of sigmoid colon, concerning for possible  colitis/diverticulitis with few adjacent prominent mesocolic nodes.  Recommend posttreatment colonoscopy to rule out underlying neoplastic  lesion  2. Confluent thickening right lateral to the thickened sigmoid colon  along the lateral pelvic fascia possibly phlegmon; no drainable fluid  collection.  3. Few prominent retroperitoneal lymph nodes, as above, consider  attention on follow-up.  4. Lucency along the anterosuperior L2 vertebral body, indeterminate,  possible demineralization recommend correlation with prior imaging if  available and/or attention on follow-up as clinically desired.  5. Borderline splenomegaly.     Scheduled flex sig with next available surgeon

## 2024-08-26 NOTE — TELEPHONE ENCOUNTER
Diagnosis, Referred by & from: Sigmoid Thickening on CT, Flex Sig   Appt date: 9/3/2024   NOTES STATUS DETAILS   OFFICE NOTE from referring provider N/A    OFFICE NOTE from other specialist [facility]:  7/16/24, 7/9/24 - [facility] OV with Dr. Pastor   DISCHARGE SUMMARY from hospital Care Everywhere / [facility]:  7/12/24 - Admission with Dr. Bennett    [facility]:  5/1/24 - Admission with Dr. Sanchez   DISCHARGE REPORT from the ER [facility]:  8/23/24 - ED OV with Dr. Christy  8/3/24 - ED OV with Dr. Hayes   OPERATIVE REPORT N/A    MEDICATION LIST Internal    LABS N/A    DIAGNOSTIC PROCEDURES N/A    IMAGING (DISC & REPORT)      CT Received / [facility]:  8/3/24 - CT Abd/Pelvis  7/12/24 - CT Abd/Pelvis    [facility]:  5/1/24 - CT Abd/Pelvis     Records Requested  08/26/24    Facility  [facility]  Fax: [phone]   Outcome * 8/26/24 1:24 PM Faxed urgent request to [facility] for images to be pushed to [place] [facility]s. - Any    * 8/26/24 2:48 PM Images received from [facility] and attached to the patient in PACs. - Any

## 2024-08-28 NOTE — TELEPHONE ENCOUNTER
Diagnosis, Referred by & from: Sigmoid Thickening   Appt date: 10/29/2024   NOTES STATUS DETAILS   OFFICE NOTE from referring provider N/A    OFFICE NOTE from other specialist [facility]:  9/12/24 - PCC OV with Dr. Almanza  7/16/24, 7/9/24 - PCC OV with Dr. Pastor   DISCHARGE SUMMARY from hospital Care Everywhere / [facility]:  7/12/24 - Admission with Dr. Bennett     [facility]:  5/1/24 - Admission with Dr. Sanchez   DISCHARGE REPORT from the ER [facility]:  8/23/24 - ED OV with Dr. Christy  8/3/24 - ED OV with Dr. Hayes   OPERATIVE REPORT N/A    MEDICATION LIST Internal    LABS     BIOPSIES/PATHOLOGY RELATED TO DIAGNOSIS [facility]:  ([facility]) 10/18/24 - Colon Biopsy   DIAGNOSTIC PROCEDURES     COLONOSCOPY (most recent all time after 5 years) [facility]:  ([facility]) 10/18/24 - Colonoscopy   IMAGING (DISC & REPORT)      CT Received / [facility]:  8/3/24 - CT Abd/Pelvis  7/12/24 - CT Abd/Pelvis     [facility]:  5/1/24 - CT Abd/Pelvis

## 2024-09-03 ENCOUNTER — PRE VISIT (OUTPATIENT)
Dept: SURGERY | Facility: CLINIC | Age: 49
End: 2024-09-03

## 2024-09-12 ENCOUNTER — OFFICE VISIT (OUTPATIENT)
Dept: INTERNAL MEDICINE | Facility: CLINIC | Age: 49
End: 2024-09-12
Payer: COMMERCIAL

## 2024-09-12 VITALS
HEART RATE: 74 BPM | OXYGEN SATURATION: 97 % | WEIGHT: 237.5 LBS | SYSTOLIC BLOOD PRESSURE: 131 MMHG | DIASTOLIC BLOOD PRESSURE: 84 MMHG | BODY MASS INDEX: 37.2 KG/M2

## 2024-09-12 DIAGNOSIS — K57.32 DIVERTICULITIS OF COLON: ICD-10-CM

## 2024-09-12 DIAGNOSIS — Z12.11 SCREEN FOR COLON CANCER: Primary | ICD-10-CM

## 2024-09-12 PROCEDURE — 99214 OFFICE O/P EST MOD 30 MIN: CPT | Mod: GC

## 2024-09-12 RX ORDER — SENNOSIDES A AND B 8.6 MG/1
1 TABLET, FILM COATED ORAL DAILY PRN
Qty: 30 TABLET | Refills: 0 | Status: SHIPPED | OUTPATIENT
Start: 2024-09-12

## 2024-09-12 RX ORDER — SENNOSIDES A AND B 8.6 MG/1
1 TABLET, FILM COATED ORAL DAILY PRN
Qty: 30 TABLET | Refills: 0 | Status: SHIPPED | OUTPATIENT
Start: 2024-09-12 | End: 2024-09-12

## 2024-09-12 RX ORDER — POLYETHYLENE GLYCOL 3350 17 G/17G
1 POWDER, FOR SOLUTION ORAL DAILY
Qty: 510 G | Refills: 2 | Status: SHIPPED | OUTPATIENT
Start: 2024-09-12

## 2024-09-12 RX ORDER — POLYETHYLENE GLYCOL 3350 17 G/17G
17 POWDER, FOR SOLUTION ORAL DAILY
Status: DISCONTINUED | OUTPATIENT
Start: 2024-09-12 | End: 2024-09-12

## 2024-09-12 NOTE — PROGRESS NOTES
"  Assessment & Plan     Screen for colon cancer  Diverticulitis of colon  Anxiety  Recent hx of diverticulitis, requiring 3 courses of cipro/flagyl in the past 4 months with a 5/2024 CT showing potential perforation (no surgery performed). Most recently had hematochezia prior to 8/23 ED visit, resolved during subsequent 10 day course of cipro/flagyl. Currently, pt continues to have moderate-severe cramping that has interfered with work hours. Worse with stress of work and taking care of sick partner, somewhat better on weekends. Endorses bristol stool chart stage 1 everyday. Has only tried mag citrate 1x. Agreeable to trying senna/miralax. Counseled pt to take 1cap of miralax and 1 tablet senna per day to start, can increase to 1cap BID of miralax if needed. Pt verbalized understanding of staying hydrated and returning to clinic or ED if worsening abd pain, bleeding, severe nausea/vomiting/diarrhea.  Due to hx of unintentional narcotic overdose in 6/6/23, will not offer PRN anxiety medication until after seeing how miralax/senna works for him as pt's description of anxiety is mainly related to his Bms.  - Adult GI  Referral - Procedure Only; Future  - senna (SENOKOT) 8.6 MG tablet; Take 1 tablet by mouth daily as needed for constipation.  - polyethylene glycol (MIRALAX) 17 GM/Dose powder; Take 17 g (1 Capful) by mouth daily.    This patient has been staffed with Dr. Gwen Almanza  PGY-1  Internal Medicine  [facility]      BMI  Estimated body mass index is 37.2 kg/m  as calculated from the following:    Height as of 8/23/24: 1.702 m (5' 7\").    Weight as of this encounter: 107.7 kg (237 lb 8 oz).             No follow-ups on file.    Antonieta Blanco is a 48 year old, presenting for the following health issues:  Follow Up (Diverticulitis - cramping. Colonscopy is scheduled for 10/18. His  was sick recently. This is exacerbating his anxiety.) and General Visit      9/12/2024     7:16 AM "   Additional Questions   Roomed by Zelalem     Here for follow-up of diverticulitis. Main issues currently are cramping, interfering with work. Denies worsening of cramping with eating meals, pain can come without stimulus. Feels like he needs to defecate. If he sits for 5 minutes, feelings pass without needing to go. Feels like symptoms are worse during the week when he is working, symptoms are greatly resolved on weekends when he does not work. Has taken mag citrate once, unclear if it was effective or not.     When he does defecate, he occasionally has bright red blood on the stool itself, not mixed in. No melena. Last episode was around the time of the 8/23/24, got cipro/flagyl which resolved the hematochezia at that time.    Also uses OTC claritin for seasonal allergies.     History of Present Illness       Reason for visit:  Cramping and anxiety    He eats 2-3 servings of fruits and vegetables daily.He consumes 2 sweetened beverage(s) daily.He exercises with enough effort to increase his heart rate 20 to 29 minutes per day.  He is missing 1 dose(s) of medications per week.                     Objective    /84 (BP Location: Right arm, Patient Position: Sitting, Cuff Size: Adult Large)   Pulse 74   Wt 107.7 kg (237 lb 8 oz)   SpO2 97%   BMI 37.20 kg/m    Body mass index is 37.2 kg/m .  Physical Exam  Constitutional:       Appearance: Normal appearance.   HENT:      Nose: Nose normal.      Mouth/Throat:      Mouth: Mucous membranes are moist.      Pharynx: Oropharynx is clear.   Eyes:      Extraocular Movements: Extraocular movements intact.      Pupils: Pupils are equal, round, and reactive to light.   Cardiovascular:      Rate and Rhythm: Normal rate and regular rhythm.   Pulmonary:      Effort: Pulmonary effort is normal.      Breath sounds: Normal breath sounds.   Abdominal:      General: Abdomen is flat. There is no distension.      Palpations: Abdomen is soft. There is no mass.      Tenderness:  There is abdominal tenderness. There is no guarding or rebound.      Comments: Mild-moderate TTP in LLQ with light and deep palpation.   Genitourinary:     Comments: Deferred GI/ exam at this time  Musculoskeletal:         General: No swelling. Normal range of motion.   Skin:     General: Skin is warm and dry.      Capillary Refill: Capillary refill takes less than 2 seconds.   Neurological:      General: No focal deficit present.      Mental Status: He is alert and oriented to person, place, and time. Mental status is at baseline.   Psychiatric:         Mood and Affect: Mood normal.         Behavior: Behavior normal.         Thought Content: Thought content normal.                    Signed Electronically by: Donnie Almanza MD    Pt was seen and examined with Dr. Almanza.  I agree with his documentation as noted above.    My additional comments: None    Rhett Hidalgo MD

## 2024-09-27 RX ORDER — BISACODYL 5 MG/1
TABLET, DELAYED RELEASE ORAL
Qty: 4 TABLET | Refills: 0 | Status: SHIPPED | OUTPATIENT
Start: 2024-09-27

## 2024-09-27 NOTE — TELEPHONE ENCOUNTER
Extended Golytely Bowel Prep  recommended due to pt recently prescribed senna and miralax. Instructions were sent via letter. Bowel prep was sent 9/27/2024 to    [place], [place] - [facility] [phone]

## 2024-10-12 NOTE — PROGRESS NOTES
Colon and Rectal Surgery Clinic Note     RE:      Bella Quintero.  :   1975.  IVETH:   9/3/2024.     Reason for visit: diverticulitis      HPI: Bella Quintero is a 48 year old male who presents today for diverticulitis. He has a past medical history of diverticulitis, alcohol (3 bottles of wine per week) and tobacco use (current smoker). He was admitted on 2024 and 2024 for acute complicated diverticulitis. Treated with IV antibiotics with transition to PO antibiotics. Follow up CT Abdomen/Pelvis on 8/3/2024 demonstrated continued eccentric wall thickening in the sigmoid colon, findings concerning for neoplasm and unchanged prominent pelvic and portacaval lymph nodes. He presented to the ED again on 2024 for LLQ pain. He was prescribed PO cipro/flagyl. He rescheduled his colonoscopy due to a recent diverticulitis flare.     CT Abdomen/Pelvis on 10/24/2024 demonstrated continued but slightly decreased eccentric right wall thickening and enhancement of the sigmoid colon with adjacent pericolonic inflammatory changes, favoring acute on chronic diverticulitis versus sigmoid colonic neoplasm with superimposed inflammatory changes. CEA 3.0.      Today Bella reports LLQ pain since April. Passing gas and stool.     CT Abdomen/Pelvis (2024):  IMPRESSION:    1. Marked midsigmoid colonic wall thickening and pericolonic inflammatory fat stranding most suggestive of perforated diverticulitis and peridiverticular phlegmon. No bety free air. No drainable abscess. Underlying malignancy felt unlikely.     Close follow-up recommended to ensure resolution.     2. Otherwise unremarkable CT.      CT Abdomen/Pelvis (2024):  IMPRESSION:     1. Eccentric appearing mural thickening with luminal narrowing, in  metabolic enhancement and pericolonic streakiness involving 10 cm  segment of sigmoid colon, concerning for possible  colitis/diverticulitis with few adjacent prominent mesocolic  nodes.  Recommend posttreatment colonoscopy to rule out underlying neoplastic  lesion  2. Confluent thickening right lateral to the thickened sigmoid colon  along the lateral pelvic fascia possibly phlegmon; no drainable fluid  collection.  3. Few prominent retroperitoneal lymph nodes, as above, consider  attention on follow-up.  4. Lucency along the anterosuperior L2 vertebral body, indeterminate,  possible demineralization recommend correlation with prior imaging if  available and/or attention on follow-up as clinically desired.  5. Borderline splenomegaly.     CT Abdomen/Pelvis (8/3/2024):  IMPRESSION:   1. Continued eccentric wall thickening in the sigmoid colon compared  to 7/12/2024. No drainable fluid collection. Findings are concerning  for neoplasm. Superimposed infection is slightly improved..  2. Unchanged prominent pelvic and portacaval lymph nodes.    CT Abdomen/Pelvis (10/24/2024):  IMPRESSION:   1. Continued but slightly decreased eccentric right wall thickening  and enhancement of the sigmoid colon with adjacent pericolonic  inflammatory changes, favoring acute on chronic diverticulitis versus  sigmoid colonic neoplasm with superimposed inflammatory changes.  2. Borderline enlarged portacaval lymph node, likely reactive.    CEA (10/24/2024):  Component      Latest Ref Rng 10/24/2024  6:36 AM   CEA      ng/mL 3.0        Medical history:  Diverticulitis   Alcohol use   Tobacco use      Surgical history:  -none     Family history:  Family History         Family History   Problem Relation Age of Onset    Colon Polyps Mother      Alcoholism Father      Diabetes Father      Colon Cancer No family hx of           Medications:  Current Outpatient Prescriptions          Current Outpatient Medications   Medication Sig Dispense Refill    amoxicillin-clavulanate (AUGMENTIN) 875-125 MG tablet Take 1 tablet by mouth 2 times daily 19 tablet 0    atorvastatin (LIPITOR) 10 MG tablet Take 1 tablet (10 mg) by mouth  "daily 30 tablet 3    atorvastatin (LIPITOR) 10 MG tablet Take 2 tablets (20 mg) by mouth daily 30 tablet 3    ciprofloxacin (CIPRO) 500 MG tablet Take 1 tablet (500 mg) by mouth 2 times daily for 10 days. 20 tablet 0    ketoconazole (NIZORAL) 2 % external shampoo Apply topically daily as needed for itching or irritation 100 mL 2    metroNIDAZOLE (FLAGYL) 500 MG tablet Take 1 tablet (500 mg) by mouth 3 times daily for 10 days. 30 tablet 0            Allergies:  Allergies         Allergies   Allergen Reactions    Codeine Swelling and Itching       PN: LW Reaction: EDEMA, GENERALIZED   Tolerated hydromorphone 5/1/24    Penicillins Other (See Comments)       Swelling, burning feeling in hands and feet. , PN: LW Reaction: EDEMA, GENERALIZED            Social history:   Social History            Tobacco Use    Smoking status: Every Day       Current packs/day: 1.00       Average packs/day: 1 pack/day for 30.1 years (30.1 ttl pk-yrs)       Types: Cigarettes       Start date: 7/9/1994    Smokeless tobacco: Never    Tobacco comments:       Revisit as pt is willing   Substance Use Topics    Alcohol use: Yes       Alcohol/week: 15.0 standard drinks of alcohol       Types: 15 Glasses of wine per week       Comment: 3 bottles of wine per week. H/o 1L EtOH every other day, self decreased several years ago.      Marital status: .     ROS:  A complete review of systems was performed with the patient and all systems negative except as per HPI.     Physical Examination:    /82 (BP Location: Left arm, Patient Position: Sitting, Cuff Size: Adult Large)   Pulse 88   Ht 5' 7\"   Wt 230 lb 9.6 oz   SpO2 96%   BMI 36.12 kg/m    General: Well hydrated. No acute distress.  CV: RRR  Lung: Non-labored breathing on RA  Abdomen: Soft, NT, obese habitus, umbilical hernia. No inguinal adenopathy palpated.  Perianal external examination:  Perianal skin: intact.  Lesions: No.  Eversion of buttocks: There was not evidence of an anal " "fissure. Details: N/A.  Skin tags or external hemorrhoids: No.  Digital rectal examination: Was performed.   Sphincter tone: Good.  Palpable lesions: No.  Other: None.       Procedures:  Flexible sigmoidoscopy: after obtaining informed consent and performing a \"time out\", an adult flexible sigmoidoscope was introduced through the anus and passed up to the mid sigmoid colon. The quality of the prep was fair. Findings: large, circumferential mass starting at 14 cm from the AV. Able to pass . No additional abnormalities were seen. Total scope time: 10 minutes. The patient tolerated the procedure well.     ASSESSMENT     This is a 48 year old M with a large rectosigmoid mass. There is concerns for possible impending obstruction as well as microperforation, thus upfront surgery is indicated. We discussed the benefits of a robot LAR. All questions were answered. Risks, benefits, and alternatives of operative treatment were thoroughly discussed with the patient, he understands these well and agrees to proceed.     All pertinent labs and imaging were personally reviewed by me.        PLAN  - Quit smoking and drinking  - Healthy life style and weight loss were encouraged  - To OR for robotic sigmoidectomy  - Urology to place stents  - [facility]/abx bowel prep  - Preop teaching and eval     60 minutes spent on the date of the encounter doing chart review, history and exam, imaging review, documentation and further activities as noted above, excluding the procedure.     The Division of Colon and Rectal Surgery at [facility] is committed to advancing discovery and innovation in human health through research. Jaquiheydirachel Ted Quintero is willing to be contacted by our research team if they qualify for any future research studies:  Yes     Frandy Kauffman MD    Division of Colon and Rectal Surgery  [facility]     Referring Provider:  Sterling Christy MD  [address] [place] [address]" [place] [address]      Primary Care Provider:  Nyla Pastor

## 2024-10-13 ENCOUNTER — HOSPITAL ENCOUNTER (EMERGENCY)
Facility: CLINIC | Age: 49
Discharge: HOME | End: 2024-10-13
Attending: EMERGENCY MEDICINE | Admitting: EMERGENCY MEDICINE
Payer: COMMERCIAL

## 2024-10-13 ENCOUNTER — APPOINTMENT (OUTPATIENT)
Dept: CT IMAGING | Facility: CLINIC | Age: 49
End: 2024-10-13
Attending: EMERGENCY MEDICINE
Payer: COMMERCIAL

## 2024-10-13 VITALS
HEART RATE: 82 BPM | TEMPERATURE: 98.2 F | DIASTOLIC BLOOD PRESSURE: 78 MMHG | WEIGHT: 230 LBS | RESPIRATION RATE: 18 BRPM | HEIGHT: 67 IN | BODY MASS INDEX: 36.1 KG/M2 | OXYGEN SATURATION: 100 % | SYSTOLIC BLOOD PRESSURE: 130 MMHG

## 2024-10-13 DIAGNOSIS — K57.32 SIGMOID DIVERTICULITIS: ICD-10-CM

## 2024-10-13 LAB
ALBUMIN SERPL BCG-MCNC: 4.1 G/DL (ref 3.5–5.2)
ALP SERPL-CCNC: 109 U/L (ref 40–150)
ALT SERPL W P-5'-P-CCNC: 13 U/L (ref 0–70)
ANION GAP SERPL CALCULATED.3IONS-SCNC: 10 MMOL/L (ref 7–15)
AST SERPL W P-5'-P-CCNC: 16 U/L (ref 0–45)
BASOPHILS # BLD AUTO: 0.1 10E3/UL (ref 0–0.2)
BASOPHILS NFR BLD AUTO: 1 %
BILIRUB SERPL-MCNC: 0.4 MG/DL
BUN SERPL-MCNC: 10.8 MG/DL (ref 6–20)
CALCIUM SERPL-MCNC: 9 MG/DL (ref 8.8–10.4)
CHLORIDE SERPL-SCNC: 101 MMOL/L (ref 98–107)
CREAT SERPL-MCNC: 0.75 MG/DL (ref 0.67–1.17)
EGFRCR SERPLBLD CKD-EPI 2021: >90 ML/MIN/1.73M2
EOSINOPHIL # BLD AUTO: 0.4 10E3/UL (ref 0–0.7)
EOSINOPHIL NFR BLD AUTO: 4 %
ERYTHROCYTE [DISTWIDTH] IN BLOOD BY AUTOMATED COUNT: 13.7 % (ref 10–15)
GLUCOSE SERPL-MCNC: 103 MG/DL (ref 70–99)
HCO3 SERPL-SCNC: 26 MMOL/L (ref 22–29)
HCT VFR BLD AUTO: 45.9 % (ref 40–53)
HGB BLD-MCNC: 14.8 G/DL (ref 13.3–17.7)
IMM GRANULOCYTES # BLD: 0.1 10E3/UL
IMM GRANULOCYTES NFR BLD: 0 %
LIPASE SERPL-CCNC: 19 U/L (ref 13–60)
LYMPHOCYTES # BLD AUTO: 2 10E3/UL (ref 0.8–5.3)
LYMPHOCYTES NFR BLD AUTO: 18 %
MCH RBC QN AUTO: 31.5 PG (ref 26.5–33)
MCHC RBC AUTO-ENTMCNC: 32.2 G/DL (ref 31.5–36.5)
MCV RBC AUTO: 98 FL (ref 78–100)
MONOCYTES # BLD AUTO: 0.9 10E3/UL (ref 0–1.3)
MONOCYTES NFR BLD AUTO: 8 %
NEUTROPHILS # BLD AUTO: 7.8 10E3/UL (ref 1.6–8.3)
NEUTROPHILS NFR BLD AUTO: 69 %
NRBC # BLD AUTO: 0 10E3/UL
NRBC BLD AUTO-RTO: 0 /100
PLATELET # BLD AUTO: 361 10E3/UL (ref 150–450)
POTASSIUM SERPL-SCNC: 4.4 MMOL/L (ref 3.4–5.3)
PROT SERPL-MCNC: 7.4 G/DL (ref 6.4–8.3)
RBC # BLD AUTO: 4.7 10E6/UL (ref 4.4–5.9)
SODIUM SERPL-SCNC: 137 MMOL/L (ref 135–145)
WBC # BLD AUTO: 11.2 10E3/UL (ref 4–11)

## 2024-10-13 PROCEDURE — 250N000011 HC RX IP 250 OP 636: Performed by: EMERGENCY MEDICINE

## 2024-10-13 PROCEDURE — 74177 CT ABD & PELVIS W/CONTRAST: CPT

## 2024-10-13 PROCEDURE — 80053 COMPREHEN METABOLIC PANEL: CPT | Performed by: EMERGENCY MEDICINE

## 2024-10-13 PROCEDURE — 85025 COMPLETE CBC W/AUTO DIFF WBC: CPT | Performed by: EMERGENCY MEDICINE

## 2024-10-13 PROCEDURE — 99285 EMERGENCY DEPT VISIT HI MDM: CPT | Mod: 25 | Performed by: EMERGENCY MEDICINE

## 2024-10-13 PROCEDURE — 99284 EMERGENCY DEPT VISIT MOD MDM: CPT | Performed by: EMERGENCY MEDICINE

## 2024-10-13 PROCEDURE — 250N000013 HC RX MED GY IP 250 OP 250 PS 637: Performed by: EMERGENCY MEDICINE

## 2024-10-13 PROCEDURE — 83690 ASSAY OF LIPASE: CPT | Performed by: EMERGENCY MEDICINE

## 2024-10-13 PROCEDURE — 74177 CT ABD & PELVIS W/CONTRAST: CPT | Mod: 26 | Performed by: RADIOLOGY

## 2024-10-13 PROCEDURE — 36415 COLL VENOUS BLD VENIPUNCTURE: CPT | Performed by: EMERGENCY MEDICINE

## 2024-10-13 RX ORDER — IOPAMIDOL 755 MG/ML
135 INJECTION, SOLUTION INTRAVASCULAR ONCE
Status: COMPLETED | OUTPATIENT
Start: 2024-10-13 | End: 2024-10-13

## 2024-10-13 RX ORDER — METRONIDAZOLE 500 MG/1
500 TABLET ORAL 3 TIMES DAILY
Qty: 21 TABLET | Refills: 0 | Status: SHIPPED | OUTPATIENT
Start: 2024-10-13 | End: 2024-10-20

## 2024-10-13 RX ORDER — CIPROFLOXACIN 500 MG/1
500 TABLET, FILM COATED ORAL ONCE
Status: COMPLETED | OUTPATIENT
Start: 2024-10-13 | End: 2024-10-13

## 2024-10-13 RX ORDER — METRONIDAZOLE 500 MG/1
500 TABLET ORAL ONCE
Status: COMPLETED | OUTPATIENT
Start: 2024-10-13 | End: 2024-10-13

## 2024-10-13 RX ORDER — CIPROFLOXACIN 500 MG/1
500 TABLET, FILM COATED ORAL 2 TIMES DAILY
Qty: 14 TABLET | Refills: 0 | Status: SHIPPED | OUTPATIENT
Start: 2024-10-13 | End: 2024-10-20

## 2024-10-13 RX ADMIN — CIPROFLOXACIN 500 MG: 500 TABLET ORAL at 17:22

## 2024-10-13 RX ADMIN — IOPAMIDOL 135 ML: 755 INJECTION, SOLUTION INTRAVENOUS at 16:15

## 2024-10-13 RX ADMIN — METRONIDAZOLE 500 MG: 500 TABLET ORAL at 17:22

## 2024-10-13 ASSESSMENT — ACTIVITIES OF DAILY LIVING (ADL)
ADLS_ACUITY_SCORE: 35

## 2024-10-13 ASSESSMENT — COLUMBIA-SUICIDE SEVERITY RATING SCALE - C-SSRS
6. HAVE YOU EVER DONE ANYTHING, STARTED TO DO ANYTHING, OR PREPARED TO DO ANYTHING TO END YOUR LIFE?: NO
1. IN THE PAST MONTH, HAVE YOU WISHED YOU WERE DEAD OR WISHED YOU COULD GO TO SLEEP AND NOT WAKE UP?: NO
2. HAVE YOU ACTUALLY HAD ANY THOUGHTS OF KILLING YOURSELF IN THE PAST MONTH?: NO

## 2024-10-13 NOTE — ED TRIAGE NOTES
"Triage Assessment & Note:    /78   Pulse 82   Temp 98.2  F (36.8  C) (Oral)   Resp 18   Ht 1.702 m (5' 7\")   Wt 104.3 kg (230 lb)   SpO2 100%   BMI 36.02 kg/m      Patient presents with: PT c/o abd pain in the setting of diverticulitis. PT denies rectal bleeding, N/V.     Home Treatments/Remedies: Prescribed meds     Febrile / Afebrile? Afebrile     Duration of C/o:  4 days    Scott Mcneal RN  October 13, 2024       Triage Assessment (Adult)       Row Name 10/13/24 1421          Triage Assessment    Airway WDL WDL        Respiratory WDL    Respiratory WDL WDL        Cardiac WDL    Cardiac WDL WDL                     "

## 2024-10-13 NOTE — ED PROVIDER NOTES
ED Provider Note  [facility]      History     Chief Complaint   Patient presents with    Abdominal Pain     The history is provided by medical records and the patient.     Bella Quintero is a 48 year old male with history of diverticulitis c/b perforation and peridiverticular phlegmon who presents to the ED with abdominal pain.     Per review of chart patient with recent history of diverticulitis, requiring 3 courses of cipro/flagyl since 5/2024 with CT in May showing potential perforation (no surgery performed). Most recently had hematochezia prior to 8/23 ED visit, resolved during subsequent 10 day course of cipro/flagyl. He does have colonoscopy scheduled for 10/18.     Past Medical History  Past Medical History:   Diagnosis Date    Bilateral low back pain with right-sided sciatica     Diverticulitis of colon 05/01/2024    History of alcohol use disorder     Tobacco use disorder      No past surgical history on file.  ciprofloxacin (CIPRO) 500 MG tablet  metroNIDAZOLE (FLAGYL) 500 MG tablet  amoxicillin-clavulanate (AUGMENTIN) 875-125 MG tablet  atorvastatin (LIPITOR) 10 MG tablet  atorvastatin (LIPITOR) 10 MG tablet  bisacodyl (DULCOLAX) 5 MG EC tablet  ketoconazole (NIZORAL) 2 % external shampoo  polyethylene glycol (GOLYTELY) 236 g suspension  polyethylene glycol (MIRALAX) 17 GM/Dose powder  senna (SENOKOT) 8.6 MG tablet      Allergies   Allergen Reactions    Codeine Swelling and Itching     PN: LW Reaction: EDEMA, GENERALIZED   Tolerated hydromorphone 5/1/24    Penicillins Other (See Comments)     Swelling, burning feeling in hands and feet. , PN: LW Reaction: EDEMA, GENERALIZED     Family History  Family History   Problem Relation Age of Onset    Colon Polyps Mother     Alcoholism Father     Diabetes Father     Colon Cancer No family hx of      Social History   Social History     Tobacco Use    Smoking status: Every Day     Current packs/day: 1.00     Average packs/day: 1  "pack/day for 30.3 years (30.3 ttl pk-yrs)     Types: Cigarettes     Start date: 7/9/1994    Smokeless tobacco: Never    Tobacco comments:     Revisit as pt is willing   Substance Use Topics    Alcohol use: Yes     Alcohol/week: 15.0 standard drinks of alcohol     Types: 15 Glasses of wine per week     Comment: 3 bottles of wine per week. H/o 1L EtOH every other day, self decreased several years ago.    Drug use: Yes     Types: Marijuana      A complete review of systems was performed with pertinent positives and negatives noted in the HPI, and all other systems negative.    Physical Exam   BP: 130/78  Pulse: 82  Temp: 98.2  F (36.8  C)  Resp: 18  Height: 170.2 cm (5' 7\")  Weight: 104.3 kg (230 lb)  SpO2: 100 %  Physical Exam  Vitals and nursing note reviewed.   Constitutional:       General: He is not in acute distress.     Appearance: Normal appearance. He is not toxic-appearing.   HENT:      Head: Atraumatic.   Eyes:      General: No scleral icterus.     Conjunctiva/sclera: Conjunctivae normal.   Cardiovascular:      Rate and Rhythm: Normal rate.      Heart sounds: Normal heart sounds.   Pulmonary:      Effort: Pulmonary effort is normal. No respiratory distress.      Breath sounds: Normal breath sounds.   Abdominal:      Palpations: Abdomen is soft.      Tenderness: There is abdominal tenderness in the left lower quadrant. There is no guarding or rebound.   Musculoskeletal:         General: No deformity.      Cervical back: Neck supple.   Skin:     General: Skin is warm and dry.   Neurological:      General: No focal deficit present.      Mental Status: He is alert and oriented to person, place, and time.   Psychiatric:         Mood and Affect: Mood normal.         Behavior: Behavior normal.             ED Course, Procedures, & Data      Procedures                Results for orders placed or performed during the hospital encounter of 10/13/24   CT Abdomen Pelvis w Contrast     Status: None    Narrative    " EXAMINATION: CT ABDOMEN PELVIS W CONTRAST, 10/13/2024 4:27 PM    INDICATION: Abd pain LLQ, hx of prior diverticulitis    COMPARISON STUDY: CT abdomen and pelvis 8/3/2024, 7/12/2024, and  5/1/2024 exams    TECHNIQUE: CT scan of the abdomen and pelvis was performed on  multidetector CT scanner using volumetric acquisition technique and  images were reconstructed in multiple planes with variable thickness  and reviewed on dedicated workstations.     CONTRAST: Isovue 370 injected IV.    CT scan radiation dose is optimized to minimum requisite dose using  automated dose modulation techniques.    FINDINGS:  Lower Chest:  Lower chest is within normal limits. Normal size heart. Distal  esophagus is within normal limits. Trachea and main bronchi are  patent.    Liver:  Mild hazy attenuation of the liver parenchyma without masses.    Biliary System:  Gallbladder is mildly thickened, nonspecific, and likely reactive. No  biliary duct dilation.    Spleen:  Normal spleen without masses.    Pancreas:  Pancreas is within normal limits without main pancreatic ductal  dilatation or masses.    Adrenal Glands:  Normal adrenal glands without abnormalities.    Kidneys:  Normal kidneys without hydronephrosis or obstructing renal calculi.    Gastrointestinal Tract:  Appendix is within normal limits. Nondilated small bowel loops.  Redundant loops of colon. Increased eccentric right enhancement and  wall thickening of the distal sigmoid colon/rectosigmoid junction and  increased right pericolonic fat stranding and soft tissue thickening  compared to 8/3/2024, and 7/12/2024 exams. No pericolonic extraluminal  air or collection. No evidence of bowel obstruction.    Mesentery/Peritoneum/Retroperitoneum:  No pneumoperitoneum or free fluid.    Pelvis/Reproductive Organs:  Urinary bladder is moderately distended and within normal limits.  Reproductive organs are within normal limits.    Lymph Nodes:  No significant abdominal or pelvic  lymphadenopathy.    Vasculature:  Patent aorta and branching vasculature. Patent hepatic veins, IVC, and  portal venous system.    Osseous Structures:  No acute osseous abnormalities. Minimal multilevel degenerative  changes of the spine.    Soft Tissues:  Soft tissues are within normal limits.      Impression    IMPRESSION:  Increased eccentric right wall thickening of the sigmoid colon and  increased pericolonic inflammatory changes compared to 8/3/2024 and  7/12/2024 CT exams. Findings favor acute on chronic diverticulitis.  Findings may also represent sigmoid colonic neoplasm with superimposed  inflammatory changes.    I have personally reviewed the examination and initial interpretation  and I agree with the findings.    ALDA DONG          SYSTEM ID:  [id number]   Comprehensive metabolic panel     Status: Abnormal   Result Value Ref Range    Sodium 137 135 - 145 mmol/L    Potassium 4.4 3.4 - 5.3 mmol/L    Carbon Dioxide (CO2) 26 22 - 29 mmol/L    Anion Gap 10 7 - 15 mmol/L    Urea Nitrogen 10.8 6.0 - 20.0 mg/dL    Creatinine 0.75 0.67 - 1.17 mg/dL    GFR Estimate >90 >60 mL/min/1.73m2    Calcium 9.0 8.8 - 10.4 mg/dL    Chloride 101 98 - 107 mmol/L    Glucose 103 (H) 70 - 99 mg/dL    Alkaline Phosphatase 109 40 - 150 U/L    AST 16 0 - 45 U/L    ALT 13 0 - 70 U/L    Protein Total 7.4 6.4 - 8.3 g/dL    Albumin 4.1 3.5 - 5.2 g/dL    Bilirubin Total 0.4 <=1.2 mg/dL   Lipase     Status: Normal   Result Value Ref Range    Lipase 19 13 - 60 U/L   CBC with platelets and differential     Status: Abnormal   Result Value Ref Range    WBC Count 11.2 (H) 4.0 - 11.0 10e3/uL    RBC Count 4.70 4.40 - 5.90 10e6/uL    Hemoglobin 14.8 13.3 - 17.7 g/dL    Hematocrit 45.9 40.0 - 53.0 %    MCV 98 78 - 100 fL    MCH 31.5 26.5 - 33.0 pg    MCHC 32.2 31.5 - 36.5 g/dL    RDW 13.7 10.0 - 15.0 %    Platelet Count 361 150 - 450 10e3/uL    % Neutrophils 69 %    % Lymphocytes 18 %    % Monocytes 8 %    % Eosinophils 4 %    % Basophils 1  %    % Immature Granulocytes 0 %    NRBCs per 100 WBC 0 <1 /100    Absolute Neutrophils 7.8 1.6 - 8.3 10e3/uL    Absolute Lymphocytes 2.0 0.8 - 5.3 10e3/uL    Absolute Monocytes 0.9 0.0 - 1.3 10e3/uL    Absolute Eosinophils 0.4 0.0 - 0.7 10e3/uL    Absolute Basophils 0.1 0.0 - 0.2 10e3/uL    Absolute Immature Granulocytes 0.1 <=0.4 10e3/uL    Absolute NRBCs 0.0 10e3/uL   CBC with Platelets & Differential     Status: Abnormal    Narrative    The following orders were created for panel order CBC with Platelets & Differential.  Procedure                               Abnormality         Status                     ---------                               -----------         ------                     CBC with platelets and d...[[id number]]  Abnormal            Final result                 Please view results for these tests on the individual orders.     Medications   iopamidol (ISOVUE-370) solution 135 mL (135 mLs Intravenous $Given 10/13/24 1615)   sodium chloride (PF) 0.9% PF flush 84 mL (84 mLs Intracatheter $Given 10/13/24 1615)   ciprofloxacin (CIPRO) tablet 500 mg (500 mg Oral $Given 10/13/24 1722)   metroNIDAZOLE (FLAGYL) tablet 500 mg (500 mg Oral $Given 10/13/24 1722)     Labs Ordered and Resulted from Time of ED Arrival to Time of ED Departure   COMPREHENSIVE METABOLIC PANEL - Abnormal       Result Value    Sodium 137      Potassium 4.4      Carbon Dioxide (CO2) 26      Anion Gap 10      Urea Nitrogen 10.8      Creatinine 0.75      GFR Estimate >90      Calcium 9.0      Chloride 101      Glucose 103 (*)     Alkaline Phosphatase 109      AST 16      ALT 13      Protein Total 7.4      Albumin 4.1      Bilirubin Total 0.4     CBC WITH PLATELETS AND DIFFERENTIAL - Abnormal    WBC Count 11.2 (*)     RBC Count 4.70      Hemoglobin 14.8      Hematocrit 45.9      MCV 98      MCH 31.5      MCHC 32.2      RDW 13.7      Platelet Count 361      % Neutrophils 69      % Lymphocytes 18      % Monocytes 8      % Eosinophils 4       % Basophils 1      % Immature Granulocytes 0      NRBCs per 100 WBC 0      Absolute Neutrophils 7.8      Absolute Lymphocytes 2.0      Absolute Monocytes 0.9      Absolute Eosinophils 0.4      Absolute Basophils 0.1      Absolute Immature Granulocytes 0.1      Absolute NRBCs 0.0     LIPASE - Normal    Lipase 19       CT Abdomen Pelvis w Contrast   Final Result   IMPRESSION:   Increased eccentric right wall thickening of the sigmoid colon and   increased pericolonic inflammatory changes compared to 8/3/2024 and   7/12/2024 CT exams. Findings favor acute on chronic diverticulitis.   Findings may also represent sigmoid colonic neoplasm with superimposed   inflammatory changes.      I have personally reviewed the examination and initial interpretation   and I agree with the findings.      ALDA DONG DO            SYSTEM ID:  [id number]             Critical care was not performed.     Medical Decision Making  The patient's presentation was of moderate complexity (an acute illness with systemic symptoms).    The patient's evaluation involved:  review of external note(s) from 1 sources (see separate area of note for details)  review of 3+ test result(s) ordered prior to this encounter (see separate area of note for details)  ordering and/or review of 3+ test(s) in this encounter (see separate area of note for details)    The patient's management necessitated moderate risk (prescription drug management including medications given in the ED).    Assessment & Plan    Bella Quintero is a 48 year old male with history of diverticulitis c/b perforation and peridiverticular phlegmon who presents to the ED with abdominal pain.  Patient is nontoxic-appearing department, his vital signs within normal limits.  He has tenderness in the left lower quadrant without rebound or guarding.  Patient worked up with labs that showed a mild leukocytosis to 11, further worked up with CT scan of the abdomen pelvis which showed signs  of acute on chronic diverticulitis.  Findings may also represent sigmoid colonic neoplasm and patient informed of this, has outpatient scheduled colonoscopy to further assess.  Patient treated with antibiotics and discharged with outpatient follow-up and return precautions.    I have reviewed the nursing notes. I have reviewed the findings, diagnosis, plan and need for follow up with the patient.    Discharge Medication List as of 10/13/2024  5:24 PM        START taking these medications    Details   ciprofloxacin (CIPRO) 500 MG tablet Take 1 tablet (500 mg) by mouth 2 times daily for 7 days., Disp-14 tablet, R-0, E-Prescribe      metroNIDAZOLE (FLAGYL) 500 MG tablet Take 1 tablet (500 mg) by mouth 3 times daily for 7 days., Disp-21 tablet, R-0, E-PrescribeEat yogurt or cottage cheese daily to prevent diarrhea that can be caused by taking this medication.             Final diagnoses:   Sigmoid diverticulitis       Westley Dial MD  [facility] EMERGENCY DEPARTMENT  10/13/2024        Westley Dial MD  10/17/24 1041

## 2024-10-16 ENCOUNTER — TELEPHONE (OUTPATIENT)
Dept: SURGERY | Facility: CLINIC | Age: 49
End: 2024-10-16
Payer: COMMERCIAL

## 2024-10-16 ENCOUNTER — PATIENT OUTREACH (OUTPATIENT)
Dept: SURGERY | Facility: CLINIC | Age: 49
End: 2024-10-16
Payer: COMMERCIAL

## 2024-10-16 DIAGNOSIS — K63.9 SIGMOID THICKENING: Primary | ICD-10-CM

## 2024-10-16 DIAGNOSIS — R59.1 LYMPHADENOPATHY: ICD-10-CM

## 2024-10-16 NOTE — TELEPHONE ENCOUNTER
Left Voicemail (1st Attempt) and Sent [employer] (1st Attempt) for the patient to call back and schedule the following:          Appointment Information / Please Schedule This Appointment For:     Timing: before visit on 10/29     Imaging Appointments: CT  Abdomen Pelvis and labs     Imaging Questions: N/A

## 2024-10-16 NOTE — PROGRESS NOTES
Pt states he currently has a diverticulitis flare on antibiotics. He has to cancel his colonoscopy on 10/18. Discussed with Dr. Kauffman. Plan for visit on 10/29 with CT A/P and CEA (if possible).

## 2024-10-17 ENCOUNTER — TELEPHONE (OUTPATIENT)
Dept: GASTROENTEROLOGY | Facility: CLINIC | Age: 49
End: 2024-10-17
Payer: COMMERCIAL

## 2024-10-17 ENCOUNTER — ANESTHESIA EVENT (OUTPATIENT)
Dept: SURGERY | Facility: AMBULATORY SURGERY CENTER | Age: 49
End: 2024-10-17

## 2024-10-17 PROBLEM — J96.01 ACUTE RESPIRATORY FAILURE WITH HYPOXIA (H): Status: ACTIVE | Noted: 2023-06-06

## 2024-10-17 PROBLEM — I95.9 HYPOTENSION: Status: ACTIVE | Noted: 2023-06-06

## 2024-10-17 PROBLEM — J69.0 ASPIRATION PNEUMONITIS (H): Status: ACTIVE | Noted: 2023-06-06

## 2024-10-17 PROBLEM — T40.411A: Status: ACTIVE | Noted: 2023-06-06

## 2024-10-17 RX ORDER — NALOXONE HYDROCHLORIDE 0.4 MG/ML
0.1 INJECTION, SOLUTION INTRAMUSCULAR; INTRAVENOUS; SUBCUTANEOUS
Status: CANCELLED | OUTPATIENT
Start: 2024-10-17

## 2024-10-17 RX ORDER — LIDOCAINE 40 MG/G
CREAM TOPICAL
Status: CANCELLED | OUTPATIENT
Start: 2024-10-17

## 2024-10-17 RX ORDER — HALOPERIDOL 5 MG/ML
1 INJECTION INTRAMUSCULAR
Status: CANCELLED | OUTPATIENT
Start: 2024-10-17

## 2024-10-17 RX ORDER — OXYCODONE HYDROCHLORIDE 5 MG/1
10 TABLET ORAL
Status: CANCELLED | OUTPATIENT
Start: 2024-10-17

## 2024-10-17 RX ORDER — ACETAMINOPHEN 325 MG/1
975 TABLET ORAL ONCE
Status: CANCELLED | OUTPATIENT
Start: 2024-10-17 | End: 2024-10-17

## 2024-10-17 RX ORDER — DEXAMETHASONE SODIUM PHOSPHATE 10 MG/ML
4 INJECTION, SOLUTION INTRAMUSCULAR; INTRAVENOUS
Status: CANCELLED | OUTPATIENT
Start: 2024-10-17

## 2024-10-17 RX ORDER — SODIUM CHLORIDE, SODIUM LACTATE, POTASSIUM CHLORIDE, CALCIUM CHLORIDE 600; 310; 30; 20 MG/100ML; MG/100ML; MG/100ML; MG/100ML
INJECTION, SOLUTION INTRAVENOUS CONTINUOUS
Status: CANCELLED | OUTPATIENT
Start: 2024-10-17

## 2024-10-17 RX ORDER — DIMENHYDRINATE 50 MG/ML
25 INJECTION, SOLUTION INTRAMUSCULAR; INTRAVENOUS
Status: CANCELLED | OUTPATIENT
Start: 2024-10-17

## 2024-10-17 RX ORDER — ONDANSETRON 4 MG/1
4 TABLET, ORALLY DISINTEGRATING ORAL EVERY 30 MIN PRN
Status: CANCELLED | OUTPATIENT
Start: 2024-10-17

## 2024-10-17 RX ORDER — ONDANSETRON 2 MG/ML
4 INJECTION INTRAMUSCULAR; INTRAVENOUS EVERY 30 MIN PRN
Status: CANCELLED | OUTPATIENT
Start: 2024-10-17

## 2024-10-17 RX ORDER — OXYCODONE HYDROCHLORIDE 5 MG/1
5 TABLET ORAL
Status: CANCELLED | OUTPATIENT
Start: 2024-10-17

## 2024-10-17 NOTE — TELEPHONE ENCOUNTER
Caller: Bella    Reason for Reschedule/Cancellation   (please be detailed, any staff messages or encounters to note?): Pt has diverticulitis and was told to reschedule      Prior to reschedule please review:  Ordering Provider: NITA TREVINO   Sedation Determined: MAC  Does patient have any ASC Exclusions, please identify?: no - Pt stated they are  not on home oxygen      Notes on Cancelled Procedure:  Procedure: Lower Endoscopy [Colonoscopy]   Date: 10/18/24  [facility] [address] [place] [address], [address] [place] [address]  Surgeon: DEVI      Rescheduled: Yes,   Procedure: Lower Endoscopy [Colonoscopy]    Date: 12/26/24   Location: [facility]; [address] [place] [address], [address] [place] [address]   Surgeon: ABRAHAM   Sedation Level Scheduled  MAC ,  Reason for Sedation Level per order   Instructions updated and sent: yes, mychart     Does patient need PAC or Pre -Op Rescheduled? : no       Did you cancel or rescheduled an EUS procedure? No.

## 2024-10-18 ENCOUNTER — ANESTHESIA (OUTPATIENT)
Dept: SURGERY | Facility: AMBULATORY SURGERY CENTER | Age: 49
End: 2024-10-18

## 2024-10-18 ENCOUNTER — TELEPHONE (OUTPATIENT)
Dept: SURGERY | Facility: CLINIC | Age: 49
End: 2024-10-18
Payer: COMMERCIAL

## 2024-10-18 NOTE — TELEPHONE ENCOUNTER
Left Voicemail (2nd Attempt) and Sent [employer] (2nd Attempt) for the patient to call back and schedule the following:        Appointment Information / Please Schedule This Appointment For:     Timing: before visit on 10/29     Imaging Appointments: CT  Abdomen Pelvis and labs     Imaging Questions: N/A

## 2024-10-22 ENCOUNTER — TELEPHONE (OUTPATIENT)
Dept: SURGERY | Facility: CLINIC | Age: 49
End: 2024-10-22
Payer: COMMERCIAL

## 2024-10-22 NOTE — TELEPHONE ENCOUNTER
Patient confirmed scheduled appointment:  Date: 10/24/24  Time: 630 am/800 am  Visit type: Labs  Provider: Lab/Imaging  Location: [facility]  Testing/imaging: CEA/CT CAP  Additional notes: n/a

## 2024-10-24 ENCOUNTER — ANCILLARY PROCEDURE (OUTPATIENT)
Dept: CT IMAGING | Facility: CLINIC | Age: 49
End: 2024-10-24
Attending: SURGERY
Payer: COMMERCIAL

## 2024-10-24 ENCOUNTER — LAB (OUTPATIENT)
Dept: LAB | Facility: CLINIC | Age: 49
End: 2024-10-24
Payer: COMMERCIAL

## 2024-10-24 DIAGNOSIS — K63.9 SIGMOID THICKENING: ICD-10-CM

## 2024-10-24 DIAGNOSIS — R59.1 LYMPHADENOPATHY: ICD-10-CM

## 2024-10-24 LAB — CEA SERPL-MCNC: 3 NG/ML

## 2024-10-24 PROCEDURE — 74177 CT ABD & PELVIS W/CONTRAST: CPT | Mod: GC | Performed by: RADIOLOGY

## 2024-10-24 PROCEDURE — 82378 CARCINOEMBRYONIC ANTIGEN: CPT | Performed by: SURGERY

## 2024-10-24 PROCEDURE — 36415 COLL VENOUS BLD VENIPUNCTURE: CPT | Performed by: PATHOLOGY

## 2024-10-24 PROCEDURE — 99000 SPECIMEN HANDLING OFFICE-LAB: CPT | Performed by: PATHOLOGY

## 2024-10-24 RX ORDER — IOPAMIDOL 755 MG/ML
112 INJECTION, SOLUTION INTRAVASCULAR ONCE
Status: COMPLETED | OUTPATIENT
Start: 2024-10-24 | End: 2024-10-24

## 2024-10-24 RX ADMIN — IOPAMIDOL 112 ML: 755 INJECTION, SOLUTION INTRAVASCULAR at 07:54

## 2024-10-24 NOTE — DISCHARGE INSTRUCTIONS

## 2024-10-29 ENCOUNTER — TELEPHONE (OUTPATIENT)
Dept: GASTROENTEROLOGY | Facility: CLINIC | Age: 49
End: 2024-10-29

## 2024-10-29 ENCOUNTER — TELEPHONE (OUTPATIENT)
Dept: SURGERY | Facility: CLINIC | Age: 49
End: 2024-10-29

## 2024-10-29 ENCOUNTER — PRE VISIT (OUTPATIENT)
Dept: SURGERY | Facility: CLINIC | Age: 49
End: 2024-10-29

## 2024-10-29 ENCOUNTER — OFFICE VISIT (OUTPATIENT)
Dept: SURGERY | Facility: CLINIC | Age: 49
End: 2024-10-29
Payer: COMMERCIAL

## 2024-10-29 VITALS
OXYGEN SATURATION: 96 % | BODY MASS INDEX: 36.19 KG/M2 | DIASTOLIC BLOOD PRESSURE: 82 MMHG | HEIGHT: 67 IN | WEIGHT: 230.6 LBS | HEART RATE: 88 BPM | SYSTOLIC BLOOD PRESSURE: 128 MMHG

## 2024-10-29 DIAGNOSIS — E66.812 CLASS 2 OBESITY WITH BODY MASS INDEX (BMI) OF 36.0 TO 36.9 IN ADULT, UNSPECIFIED OBESITY TYPE, UNSPECIFIED WHETHER SERIOUS COMORBIDITY PRESENT: ICD-10-CM

## 2024-10-29 DIAGNOSIS — K63.9 SIGMOID THICKENING: Primary | ICD-10-CM

## 2024-10-29 DIAGNOSIS — C19 MALIGNANT NEOPLASM OF RECTOSIGMOID JUNCTION (H): Primary | ICD-10-CM

## 2024-10-29 DIAGNOSIS — C19 MALIGNANT NEOPLASM OF RECTOSIGMOID JUNCTION (H): ICD-10-CM

## 2024-10-29 PROCEDURE — 88305 TISSUE EXAM BY PATHOLOGIST: CPT | Mod: TC | Performed by: SURGERY

## 2024-10-29 PROCEDURE — 88305 TISSUE EXAM BY PATHOLOGIST: CPT | Mod: 26 | Performed by: PATHOLOGY

## 2024-10-29 PROCEDURE — 99205 OFFICE O/P NEW HI 60 MIN: CPT | Mod: 25 | Performed by: SURGERY

## 2024-10-29 PROCEDURE — 45330 DIAGNOSTIC SIGMOIDOSCOPY: CPT | Performed by: SURGERY

## 2024-10-29 RX ORDER — NEOMYCIN SULFATE 500 MG/1
1000 TABLET ORAL EVERY 6 HOURS
Qty: 6 TABLET | Refills: 0 | Status: SHIPPED | OUTPATIENT
Start: 2024-10-29

## 2024-10-29 RX ORDER — ONDANSETRON 4 MG/1
4 TABLET, FILM COATED ORAL EVERY 6 HOURS
Qty: 3 TABLET | Refills: 0 | Status: SHIPPED | OUTPATIENT
Start: 2024-10-29

## 2024-10-29 RX ORDER — POLYETHYLENE GLYCOL 3350 17 G/17G
POWDER, FOR SOLUTION ORAL
Qty: 238 G | Refills: 0 | Status: SHIPPED | OUTPATIENT
Start: 2024-10-29

## 2024-10-29 RX ORDER — MAGNESIUM CARB/ALUMINUM HYDROX 105-160MG
TABLET,CHEWABLE ORAL
Qty: 296 ML | Refills: 0 | Status: SHIPPED | OUTPATIENT
Start: 2024-10-29

## 2024-10-29 RX ORDER — METRONIDAZOLE 500 MG/1
500 TABLET ORAL EVERY 6 HOURS
Qty: 3 TABLET | Refills: 0 | Status: SHIPPED | OUTPATIENT
Start: 2024-10-29

## 2024-10-29 ASSESSMENT — PAIN SCALES - GENERAL: PAINLEVEL_OUTOF10: NO PAIN (0)

## 2024-10-29 NOTE — TELEPHONE ENCOUNTER
Caller: Bella    Reason for Reschedule/Cancellation   (please be detailed, any staff messages or encounters to note?): Patient needs to be seen before Nov 21st per inbasket message from Rosmery.      Prior to reschedule please review:  Ordering Provider: Gwen  Sedation Determined: mac  Does patient have any ASC Exclusions, please identify?: N      Notes on Cancelled Procedure:  Procedure: Lower Endoscopy [Colonoscopy]   Date: 12/26/2024  Location: [facility]; [address] [place] [address], [address] Floor, [place] [id number]  Surgeon: Zenia      Rescheduled: Yes,   Procedure: Lower Endoscopy [Colonoscopy]    Date: 11/11/2024   Location: [facility]; [facility] [address] [id number]    Surgeon: Lambert   Sedation Level Scheduled  MAC ,  Reason for Sedation Level per order   Instructions updated and sent: y     Does patient need PAC or Pre -Op Rescheduled? : Y patient scheduled for PAC       Did you cancel or rescheduled an EUS procedure? No.

## 2024-10-29 NOTE — LETTER
10/29/2024       RE: Bella Quintero  [address]  [facility] [address]     Dear Colleague,    Thank you for referring your patient, Bella Quintero, to the [facility] COLON AND RECTAL SURGERY CLINIC [place] [facility]. Please see a copy of my visit note below.    Colon and Rectal Surgery Clinic Note     RE:      Bella Quintero.  :   1975.  IVETH:   9/3/2024.     Reason for visit: diverticulitis      HPI: Bella Quintero is a 48 year old male who presents today for diverticulitis. He has a past medical history of diverticulitis, alcohol (3 bottles of wine per week) and tobacco use (current smoker). He was admitted on 2024 and 2024 for acute complicated diverticulitis. Treated with IV antibiotics with transition to PO antibiotics. Follow up CT Abdomen/Pelvis on 8/3/2024 demonstrated continued eccentric wall thickening in the sigmoid colon, findings concerning for neoplasm and unchanged prominent pelvic and portacaval lymph nodes. He presented to the ED again on 2024 for LLQ pain. He was prescribed PO cipro/flagyl. He rescheduled his colonoscopy due to a recent diverticulitis flare.     CT Abdomen/Pelvis on 10/24/2024 demonstrated continued but slightly decreased eccentric right wall thickening and enhancement of the sigmoid colon with adjacent pericolonic inflammatory changes, favoring acute on chronic diverticulitis versus sigmoid colonic neoplasm with superimposed inflammatory changes. CEA 3.0.      Today Bella reports LLQ pain since April. Passing gas and stool.     CT Abdomen/Pelvis (2024):  IMPRESSION:    1. Marked midsigmoid colonic wall thickening and pericolonic inflammatory fat stranding most suggestive of perforated diverticulitis and peridiverticular phlegmon. No bety free air. No drainable abscess. Underlying malignancy felt unlikely.     Close follow-up recommended to ensure resolution.      2. Otherwise unremarkable CT.      CT Abdomen/Pelvis (7/12/2024):  IMPRESSION:     1. Eccentric appearing mural thickening with luminal narrowing, in  metabolic enhancement and pericolonic streakiness involving 10 cm  segment of sigmoid colon, concerning for possible  colitis/diverticulitis with few adjacent prominent mesocolic nodes.  Recommend posttreatment colonoscopy to rule out underlying neoplastic  lesion  2. Confluent thickening right lateral to the thickened sigmoid colon  along the lateral pelvic fascia possibly phlegmon; no drainable fluid  collection.  3. Few prominent retroperitoneal lymph nodes, as above, consider  attention on follow-up.  4. Lucency along the anterosuperior L2 vertebral body, indeterminate,  possible demineralization recommend correlation with prior imaging if  available and/or attention on follow-up as clinically desired.  5. Borderline splenomegaly.     CT Abdomen/Pelvis (8/3/2024):  IMPRESSION:   1. Continued eccentric wall thickening in the sigmoid colon compared  to 7/12/2024. No drainable fluid collection. Findings are concerning  for neoplasm. Superimposed infection is slightly improved..  2. Unchanged prominent pelvic and portacaval lymph nodes.    CT Abdomen/Pelvis (10/24/2024):  IMPRESSION:   1. Continued but slightly decreased eccentric right wall thickening  and enhancement of the sigmoid colon with adjacent pericolonic  inflammatory changes, favoring acute on chronic diverticulitis versus  sigmoid colonic neoplasm with superimposed inflammatory changes.  2. Borderline enlarged portacaval lymph node, likely reactive.    CEA (10/24/2024):  Component      Latest Ref Rng 10/24/2024  6:36 AM   CEA      ng/mL 3.0        Medical history:  Diverticulitis   Alcohol use   Tobacco use      Surgical history:  -none     Family history:  Family History         Family History   Problem Relation Age of Onset     Colon Polyps Mother       Alcoholism Father       Diabetes Father        "Colon Cancer No family hx of           Medications:  Current Outpatient Prescriptions          Current Outpatient Medications   Medication Sig Dispense Refill     amoxicillin-clavulanate (AUGMENTIN) 875-125 MG tablet Take 1 tablet by mouth 2 times daily 19 tablet 0     atorvastatin (LIPITOR) 10 MG tablet Take 1 tablet (10 mg) by mouth daily 30 tablet 3     atorvastatin (LIPITOR) 10 MG tablet Take 2 tablets (20 mg) by mouth daily 30 tablet 3     ciprofloxacin (CIPRO) 500 MG tablet Take 1 tablet (500 mg) by mouth 2 times daily for 10 days. 20 tablet 0     ketoconazole (NIZORAL) 2 % external shampoo Apply topically daily as needed for itching or irritation 100 mL 2     metroNIDAZOLE (FLAGYL) 500 MG tablet Take 1 tablet (500 mg) by mouth 3 times daily for 10 days. 30 tablet 0            Allergies:  Allergies         Allergies   Allergen Reactions     Codeine Swelling and Itching       PN: LW Reaction: EDEMA, GENERALIZED   Tolerated hydromorphone 5/1/24     Penicillins Other (See Comments)       Swelling, burning feeling in hands and feet. , PN: LW Reaction: EDEMA, GENERALIZED            Social history:   Social History            Tobacco Use     Smoking status: Every Day       Current packs/day: 1.00       Average packs/day: 1 pack/day for 30.1 years (30.1 ttl pk-yrs)       Types: Cigarettes       Start date: 7/9/1994     Smokeless tobacco: Never     Tobacco comments:       Revisit as pt is willing   Substance Use Topics     Alcohol use: Yes       Alcohol/week: 15.0 standard drinks of alcohol       Types: 15 Glasses of wine per week       Comment: 3 bottles of wine per week. H/o 1L EtOH every other day, self decreased several years ago.      Marital status: .     ROS:  A complete review of systems was performed with the patient and all systems negative except as per HPI.     Physical Examination:    /82 (BP Location: Left arm, Patient Position: Sitting, Cuff Size: Adult Large)   Pulse 88   Ht 5' 7\"   Wt " "230 lb 9.6 oz   SpO2 96%   BMI 36.12 kg/m    General: Well hydrated. No acute distress.  CV: RRR  Lung: Non-labored breathing on RA  Abdomen: Soft, NT, obese habitus, umbilical hernia. No inguinal adenopathy palpated.  Perianal external examination:  Perianal skin: intact.  Lesions: No.  Eversion of buttocks: There was not evidence of an anal fissure. Details: N/A.  Skin tags or external hemorrhoids: No.  Digital rectal examination: Was performed.   Sphincter tone: Good.  Palpable lesions: No.  Other: None.       Procedures:  Flexible sigmoidoscopy: after obtaining informed consent and performing a \"time out\", an adult flexible sigmoidoscope was introduced through the anus and passed up to the mid sigmoid colon. The quality of the prep was fair. Findings: large, circumferential mass starting at 14 cm from the AV. Able to pass . No additional abnormalities were seen. Total scope time: 10 minutes. The patient tolerated the procedure well.     ASSESSMENT     This is a 48 year old M with a large rectosigmoid mass. There is concerns for possible impending obstruction as well as microperforation, thus upfront surgery is indicated. We discussed the benefits of a robot LAR. All questions were answered. Risks, benefits, and alternatives of operative treatment were thoroughly discussed with the patient, he understands these well and agrees to proceed.     All pertinent labs and imaging were personally reviewed by me.        PLAN  - Quit smoking and drinking  - Healthy life style and weight loss were encouraged  - To OR for robotic sigmoidectomy  - Urology to place stents  - [facility]/abx bowel prep  - Preop teaching and eval     60 minutes spent on the date of the encounter doing chart review, history and exam, imaging review, documentation and further activities as noted above, excluding the procedure.     The Division of Colon and Rectal Surgery at [facility] is committed to advancing discovery and innovation " in human health through research. Williamsrachel Quintero is willing to be contacted by our research team if they qualify for any future research studies:  Yes     Frandy Kauffman MD    Division of Colon and Rectal Surgery  [facility]     Referring Provider:  Sterling Christy MD  [address] [place] [id number]      Primary Care Provider:  Nyla Pastor      Again, thank you for allowing me to participate in the care of your patient.      Sincerely,    Frandy Kauffman MD

## 2024-10-29 NOTE — TELEPHONE ENCOUNTER
"ADD ON    Pre visit planning completed.      Procedure details:    Patient scheduled for Colonoscopy on 11-11-24.     Arrival time: 1330. Procedure time 1430    Facility location: [facility]; [address] [place] [address] [place] [address]. Check in location: [facility].     Sedation type: MAC Hx Substance abuse    Pre op exam needed? Yes. Pt had Thorough in person exam with Dr. Kauffman 10-29-24    Indication for procedure:Diverticulitis of colon [K57.32]       Chart review:     Electronic implanted devices? No    Recent diagnosis of diverticulitis within the last 6 weeks?. Was seen in ER on 10-13-24 for probable Diverticulitis.  Follow up appt with Dr Kauffman 10-29-24 did in office Flex sig and noted a Mass in sigmoid.  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 No    Anticoagulants? No    Weight loss medication/injectable? No.    Other medication HOLDING recommendations:  Cannabis/Marijuana: Stop night before procedure.      Prep for procedure:     Bowel prep recommendation: Extended Golytely. Bowel prep prescription sent to    [place], [place] - [phone]    Due to: constipation noted or reported.     Prep instructions sent via [employer]         Katherine Hoffmann RN  Endoscopy Procedure Pre Assessment RN  [phone] option 2  "

## 2024-10-29 NOTE — NURSING NOTE
"Chief Complaint   Patient presents with    Consult     Sigmoid thickening       Vitals:    10/29/24 0903   BP: 128/82   BP Location: Left arm   Patient Position: Sitting   Cuff Size: Adult Large   Pulse: 88   SpO2: 96%   Weight: 230 lb 9.6 oz   Height: 5' 7\"       Body mass index is 36.12 kg/m .    Linda Diaz CMA    "

## 2024-10-29 NOTE — TELEPHONE ENCOUNTER
Attempted to contact patient in order to complete pre assessment questions.     No answer. Left message to return call to [phone] option 2.    Callback required communication sent via [employer].      Martha Dailey RN  Endoscopy Procedure Pre Assessment

## 2024-10-29 NOTE — TELEPHONE ENCOUNTER
Patient confirmed scheduled appointment:  Date: 11/07/2024  Visit type: Imaging  Provider: Radiology  Location: [facility]  Testing/imaging: CT Chest and Labs   Additional notes: n/a

## 2024-10-31 LAB
PATH REPORT.COMMENTS IMP SPEC: NORMAL
PATH REPORT.FINAL DX SPEC: NORMAL
PATH REPORT.GROSS SPEC: NORMAL
PATH REPORT.MICROSCOPIC SPEC OTHER STN: NORMAL
PATH REPORT.RELEVANT HX SPEC: NORMAL
PHOTO IMAGE: NORMAL

## 2024-10-31 NOTE — TELEPHONE ENCOUNTER
Pre assessment completed for upcoming procedure.   (Please see previous telephone encounter notes for complete details)    Patient  returned call.       Procedure details:    Arrival time and facility location reviewed.    Pre op exam needed? Yes. Pre op exam completed on 10/29/24. -- see below.     Designated  policy reviewed. Instructed to have someone stay 24  hours post procedure.       Medication review:    Medications reviewed. Please see supporting documentation below. Holding recommendations discussed (if applicable).       Prep for procedure:     Procedure prep instructions reviewed.        Any additional information needed:  N/A      Patient  verbalized understanding and had no questions or concerns at this time.      Jennifer Hu RN  Endoscopy Procedure Pre Assessment   [phone] option 2

## 2024-11-01 ENCOUNTER — PREP FOR PROCEDURE (OUTPATIENT)
Dept: UROLOGY | Facility: CLINIC | Age: 49
End: 2024-11-01
Payer: COMMERCIAL

## 2024-11-01 ENCOUNTER — TELEPHONE (OUTPATIENT)
Dept: SURGERY | Facility: CLINIC | Age: 49
End: 2024-11-01
Payer: COMMERCIAL

## 2024-11-01 NOTE — TELEPHONE ENCOUNTER
Called patient to schedule surgery with Dr. Kauffman    Spoke with: Bella     Date of Surgery: 11/21/2024    Estimated Arrival time Discussed with Patient:  No    Location of surgery: [facility] [facility]     Pre-Op H&P: PAC 11/04 at 8:15AM     WOC: No     Labs: Yes 11/07 at 3:45PM     Imaging: Yes Chest CT 11/07     Post-Op Appt Date w/ NP/PA:  Preethi Mahoney APRN, CNP 12/12 at 2:45PM    Post-Op Appt Date w/ Surgeon:  Dr. Kauffman 01/07/2025 at 11:30AM     Bowel Prep:  Yes  Full Prep with Antibiotics Sent via [employer]    Discussed with patient PAC RN will provide arrival time and instructions for surgery at the time of the appointment: [[place] locations only]: Yes      Standard Surgery Packet Sent: Yes 11/01/24  via [employer] Message      Additional Comments: PAC and lab appts were not scheduled by this writer and were setup prior to having the surgery scheduled.       All patients questions were answered and was instructed to review surgical packet and call back with any questions or concerns.       Bob Lowery on 11/1/2024 at 10:20 AM

## 2024-11-04 ENCOUNTER — OFFICE VISIT (OUTPATIENT)
Dept: SURGERY | Facility: CLINIC | Age: 49
End: 2024-11-04
Payer: COMMERCIAL

## 2024-11-04 ENCOUNTER — ANESTHESIA EVENT (OUTPATIENT)
Dept: GASTROENTEROLOGY | Facility: CLINIC | Age: 49
End: 2024-11-04
Payer: COMMERCIAL

## 2024-11-04 ENCOUNTER — PRE VISIT (OUTPATIENT)
Dept: SURGERY | Facility: CLINIC | Age: 49
End: 2024-11-04

## 2024-11-04 VITALS
DIASTOLIC BLOOD PRESSURE: 85 MMHG | HEIGHT: 67 IN | SYSTOLIC BLOOD PRESSURE: 131 MMHG | TEMPERATURE: 99.7 F | BODY MASS INDEX: 36.34 KG/M2 | HEART RATE: 98 BPM | WEIGHT: 231.5 LBS | OXYGEN SATURATION: 97 % | RESPIRATION RATE: 16 BRPM

## 2024-11-04 DIAGNOSIS — Z01.818 PRE-OPERATIVE EXAMINATION: Primary | ICD-10-CM

## 2024-11-04 DIAGNOSIS — C19 MALIGNANT NEOPLASM OF RECTOSIGMOID JUNCTION (H): ICD-10-CM

## 2024-11-04 PROCEDURE — 99203 OFFICE O/P NEW LOW 30 MIN: CPT | Performed by: PHYSICIAN ASSISTANT

## 2024-11-04 ASSESSMENT — LIFESTYLE VARIABLES: TOBACCO_USE: 1

## 2024-11-04 ASSESSMENT — ENCOUNTER SYMPTOMS: SEIZURES: 0

## 2024-11-04 ASSESSMENT — PAIN SCALES - GENERAL: PAINLEVEL_OUTOF10: SEVERE PAIN (7)

## 2024-11-04 NOTE — H&P
Pre-Operative H & P     CC:  Preoperative exam to assess for increased cardiopulmonary risk while undergoing surgery and anesthesia.    Date of Encounter: 11/4/2024  Primary Care Physician:  Nyla Pastor     Reason for visit:   Encounter Diagnoses   Name Primary?    Pre-operative examination Yes    Malignant neoplasm of rectosigmoid junction (H)        HPI  Bella Quintero is a 49 year old male who presents for pre-operative H & P in preparation for  Procedure Information       Case: [id number] Date/Time: 11/21/24 1:00pm    Procedure: Combined Cystoscopy, Insert Stent Ureter(s), RESECTION, RECTUM, LOW ANTERIOR, ROBOT-ASSISTED     Anesthesia type: General     Diagnosis: Malignant neoplasm of rectosigmoid junction (H)     Pre-op diagnosis: Malignant neoplasm of rectosigmoid junction (H)     Location: [facility] OR     Providers: Xavier Art MD and Frandy Kauffman MD            The patient is a 49-year-old man with a past medical history significant for hyperlipidemia, tobacco use, obesity, low back pain, cannabis use and alcohol abuse.  The patient has been having ongoing episodes for acute diverticulitis and most recently followed up with Dr. Kauffman in the office on 10/29/2024.  They discussed his ongoing episodes, imaging and findings and he underwent of flexible sigmoidoscopy in the clinic.  During this a large circumferential mass was found and biopsied.  Given this finding he has now been scheduled for the procedure as above.  To note he is also scheduled for colonoscopy on 11/11/2024    History is obtained from the patient and chart review    Hx of abnormal bleeding or anti-platelet use: none      Past Medical History  Past Medical History:   Diagnosis Date    Bilateral low back pain with right-sided sciatica     Diverticulitis of colon 05/01/2024    History of alcohol use disorder     HLD (hyperlipidemia)     Obesity     Tobacco use disorder        Past Surgical History  History reviewed. No pertinent  surgical history.    Prior to Admission Medications  Current Outpatient Medications   Medication Sig Dispense Refill    ketoconazole (NIZORAL) 2 % external shampoo Apply topically daily as needed for itching or irritation 100 mL 2    polyethylene glycol (MIRALAX) 17 GM/Dose powder Take 17 g (1 Capful) by mouth daily. 510 g 2    senna (SENOKOT) 8.6 MG tablet Take 1 tablet by mouth daily as needed for constipation. 30 tablet 0    atorvastatin (LIPITOR) 10 MG tablet Take 1 tablet (10 mg) by mouth daily (Patient not taking: Reported on 11/4/2024) 30 tablet 3    atorvastatin (LIPITOR) 10 MG tablet Take 2 tablets (20 mg) by mouth daily (Patient not taking: Reported on 11/4/2024) 30 tablet 3    bisacodyl (DULCOLAX) 5 MG EC tablet Two days prior to exam take two (2) tablets at 4pm. One day prior to exam take two (2) tablets at 4pm (Patient not taking: Reported on 11/4/2024) 4 tablet 0    magnesium citrate 1.745 GM/30ML solution Drink 1 bottle at 8pm the night before surgery (Patient not taking: Reported on 11/4/2024) 296 mL 0    metroNIDAZOLE (FLAGYL) 500 MG tablet Take 1 tablet (500 mg) by mouth every 6 hours. At 8:00 am, 2:00 pm, 8:00 pm the day prior to your surgery with neomycin and zofran. 3 tablet 0    neomycin (MYCIFRADIN) 500 MG tablet Take 2 tablets (1,000 mg) by mouth every 6 hours. At 8:00 am, 2:00 pm, 8:00 pm the day prior to your surgery with flagyl and zofran. (Patient not taking: Reported on 11/4/2024) 6 tablet 0    ondansetron (ZOFRAN) 4 MG tablet Take 1 tablet (4 mg) by mouth every 6 hours. At 8:00 am, 2:00 pm, 8:00 pm the day prior to your surgery with neomycin and flagyl. (Patient not taking: Reported on 11/4/2024) 3 tablet 0    polyethylene glycol (GOLYTELY) 236 g suspension Two days before procedure at 5PM fill first container with water. Mix and drink an 8 oz glass every 15 minutes until HALF of the container is gone. Place the remainder in the refrigerator. One day before procedure at 5PM drink second  half of bowel prep. Drink an 8 oz glass every 15 minutes until it is gone. Day of procedure 6 hours before arrival time fill the 2nd container with water. Mix and drink an 8 oz glass every 15 minutes until HALF of the container is gone. Discard the remaining solution. (Patient not taking: Reported on 11/4/2024) 8000 mL 0    polyethylene glycol (MIRALAX) 17 GM/Dose powder Please take 238 grams mixed with 64 oz of Gatorade at 4pm the night before surgery (Patient not taking: Reported on 11/4/2024) 238 g 0       Allergies  Allergies   Allergen Reactions    Codeine Swelling and Itching     PN: LW Reaction: EDEMA, GENERALIZED   Tolerated hydromorphone 5/1/24    Penicillins Other (See Comments)     Swelling, burning feeling in hands and feet. , PN: LW Reaction: EDEMA, GENERALIZED       Social History  Social History     Socioeconomic History    Marital status:      Spouse name: Not on file    Number of children: Not on file    Years of education: Not on file    Highest education level: Not on file   Occupational History    Not on file   Tobacco Use    Smoking status: Every Day     Current packs/day: 1.00     Average packs/day: 1 pack/day for 30.3 years (30.3 ttl pk-yrs)     Types: Cigarettes     Start date: 7/9/1994    Smokeless tobacco: Never    Tobacco comments:     Revisit as pt is willing   Substance and Sexual Activity    Alcohol use: Not Currently     Alcohol/week: 15.0 standard drinks of alcohol     Types: 15 Glasses of wine per week     Comment: 11/4/24. Minimal drinking currently. 3 bottles of wine per week. H/o 1L EtOH every other day, self decreased several years ago.    Drug use: Yes     Types: Marijuana     Comment: Daily    Sexual activity: Not on file   Other Topics Concern    Not on file   Social History Narrative    Not on file     Social Drivers of Health     Financial Resource Strain: Not on file   Food Insecurity: Not on file   Transportation Needs: Not on file   Physical Activity: Not on file    Stress: Not on file   Social Connections: Not on file   Interpersonal Safety: Low Risk  (7/9/2024)    Interpersonal Safety     Do you feel physically and emotionally safe where you currently live?: Yes     Within the past 12 months, have you been hit, slapped, kicked or otherwise physically hurt by someone?: No     Within the past 12 months, have you been humiliated or emotionally abused in other ways by your partner or ex-partner?: No   Housing Stability: Not on file       Family History  Family History   Problem Relation Age of Onset    Colon Polyps Mother     Alcoholism Father     Diabetes Father     Colon Cancer No family hx of     Anesthesia Reaction No family hx of     Deep Vein Thrombosis (DVT) No family hx of     Clotting Disorder No family hx of        Review of Systems  The complete review of systems is negative other than noted in the HPI or here.   Anesthesia Evaluation   Pt has not had prior anesthetic         ROS/MED HX  ENT/Pulmonary:     (+)                tobacco use, Current use,                       Neurologic:  - neg neurologic ROS  (-) no seizures, no CVA and no TIA   Cardiovascular:     (+) Dyslipidemia - -   -  - -                                 Previous cardiac testing   Echo: Date: Results:    Stress Test:  Date: Results:    ECG Reviewed:  Date: 6/6/23 Results:  Sinus rhythm   Prolonged QT   Abnormal ECG     Cath:  Date: Results:      METS/Exercise Tolerance: 4 - Raking leaves, gardening    Hematologic:  - neg hematologic  ROS     Musculoskeletal: Comment: Back pain       GI/Hepatic: Comment: Diverticulitis    Colonic mass     (-) GERD   Renal/Genitourinary:  - neg Renal ROS     Endo:     (+)               Obesity,       Psychiatric/Substance Use:     (+)   alcohol abuse  Recreational drug usage: Cannabis.    Infectious Disease:  - neg infectious disease ROS     Malignancy:  - neg malignancy ROS     Other:  - neg other ROS          /85 (BP Location: Right arm, Patient Position:  "Sitting, Cuff Size: Adult Regular)   Pulse 98   Temp 99.7  F (37.6  C) (Oral)   Resp 16   Ht 1.702 m (5' 7\")   Wt 105 kg (231 lb 8 oz)   SpO2 97%   BMI 36.26 kg/m      Physical Exam   Constitutional: Awake, alert, cooperative, no apparent distress, and appears stated age.  Eyes: Pupils equal, round and reactive to light, extra ocular muscles intact, sclera clear, conjunctiva normal.  HENT: Normocephalic, oral pharynx with moist mucus membranes, good dentition. No goiter appreciated. Full beard  Respiratory: Clear to auscultation bilaterally, no crackles or wheezing.  Cardiovascular: Regular rate and rhythm, normal S1 and S2, and no murmur noted.  Carotids +2, no bruits. No edema. Palpable pulses to radial  DP and PT arteries.   GI: Normal bowel sounds, soft, non-distended, non-tender, no masses palpated, no hepatosplenomegaly.  Obese  Lymph/Hematologic: No cervical lymphadenopathy and no supraclavicular lymphadenopathy.  Genitourinary:  defer  Skin: Warm and dry.  No rashes at anticipated surgical site.   Musculoskeletal: Full ROM of neck. There is no redness, warmth, or swelling of the joints. Gross motor strength is normal.    Neurologic: Awake, alert, oriented to name, place and time. Cranial nerves II-XII are grossly intact. Gait is normal.   Neuropsychiatric: Calm, cooperative. Normal affect.     Prior Labs/Diagnostic Studies   All labs and imaging personally reviewed     EKG/ stress test - if available please see in ROS above       The patient's records and results personally reviewed by this provider.     Outside records reviewed from: Care Everywhere    LAB/DIAGNOSTIC STUDIES TODAY:    The patient has lab appointment for 11/7/24.       Assessment    Bella Quintero is a 49 year old male seen as a PAC referral for risk assessment and optimization for anesthesia.    Plan/Recommendations  Pt will be optimized for the proposed procedure.  See below for details on the assessment, risk, and " "preoperative recommendations    NEUROLOGY  - No history of TIA, CVA or seizure  -Post Op delirium risk factors:  No risk identified    ENT  - No current airway concerns.  Will need to be reassessed day of surgery.  Mallampati: I  TM: > 3    CARDIAC  - Hyperlipidemia  Well controlled on home regimen - not current taking lipitor as he didn't restart after his last course of antibiotics.   - METS (Metabolic Equivalents)  Patient performs 4 or more METS exercise without symptoms             Total Score: 0      RCRI-Low risk: Class 2 0.9% complication rate             Total Score: 1    RCRI: High Risk Surgery        PULMONARY  - Obstructive Sleep Apnea  BETI risk with no formal diagnosis - patient reports he has been recommended to have a sleep study but hasn't done anything yet. Consideration for close monitoring of airway. He does reports since not drinking as much alcohol less snoring.   BETI Medium Risk             Total Score: 4    BETI: Snores loudly    BETI: BMI over 35 kg/m2    BETI: Over 50 ys old    BETI: Male      - Denies asthma or inhaler use  - Tobacco History    History   Smoking Status    Every Day    Types: Cigarettes   Smokeless Tobacco    Never   The patient was counseled to not smoke on the day of surgery      GI  - Acute diverticulitis - patient with colonoscopy on 11/11/24  ~ Colon mass - procedure as above. ORAS  PONV Low Risk  Total Score: 1           1 AN PONV: Intended Post Op Opioids        /RENAL  - Baseline Creatinine  0.75    ENDOCRINE    - BMI: Estimated body mass index is 36.26 kg/m  as calculated from the following:    Height as of this encounter: 1.702 m (5' 7\").    Weight as of this encounter: 105 kg (231 lb 8 oz).  Obesity (BMI >30)  - No history of Diabetes Mellitus    HEME  VTE Low Risk 0.5%             Total Score: 2    VTE: Male      - No history of abnormal bleeding or antiplatelet use.  ~ Type and screen ordered for the patient. He will complete on 11/7/24    MSK  Patient is NOT " Frail             Total Score: 2    Frailty: Slower walking speed    Frailty: Overall lack of energy      ~ Back pain - improved with not working.     PSYCH  - the patient was advised no cannabis for 24 hours as well as alcohol use (he reports very minimal use over the past months due to his diverticulitis).     Different anesthesia methods/types have been discussed with the patient, but they are aware that the final plan will be decided by the assigned anesthesia provider on the date of service.      The patient is optimized for their procedure. AVS with information on surgery time/arrival time, meds and NPO status given by nursing staff. No further diagnostic testing indicated.      On the day of service:     Prep time: 10 minutes  Visit time: 16 minutes  Documentation time: 6 minutes  ------------------------------------------  Total time: 32 minutes      Tere Crespo PA-C  Preoperative Assessment Center  [facility]  Clinic and Surgery Center  Phone: [phone]  Fax: [phone]

## 2024-11-04 NOTE — PATIENT INSTRUCTIONS
Preparing for Your Surgery      Name:  Bella Quintero   MRN:  [id number]   :  1975   Today's Date:  2024       Arriving for surgery:  Surgery date:  24  Arrival time:  11.00AM    Please come to:     Please come to:       [facility]    [address] [place]  [address]     The [facility] [place] Patient/Visitor Ramp is at [address]. Patients and visitors who self-park will receive the reduced hospital parking rate. If the Patient /Visitor Ramp is full, please follow the signs to the Obvious car park located at the main hospital entrance.       parking is available (24 hours/ 7 days a week)      Discounted parking pass options are available for patients and visitors. They can be purchased at the [place] desk at the main hospital entrance.     -    Stop at the security desk and they will direct surgery patients to the [facility] Check [facility] [phone]        - If you need directions, a wheelchair or an escort please stop at the Information/security desk in the lobby.     What can I eat or drink?  -  Please follow the bowel prep and diet instructions by Dr. Kauffman.  -  You may have clear liquids until 2 hours prior to arrival time. (Until 9.00AM)    Examples of clear liquids:  Water  Clear broth  Juices (apple, white grape, white cranberry  and cider) without pulp  Noncarbonated, powder based beverages  (lemonade and Chip-Aid)  Sodas (Sprite, 7-Up, ginger ale and seltzer)  Coffee or tea (without milk or cream)  Gatorade    -  No Alcohol or cannabis products for at least 24 hours before surgery.     Which medicines can I take?    Hold Aspirin for 7 days before surgery.   Hold Multivitamins for 7 days before surgery.  Hold Supplements for 7 days before surgery.  Hold Ibuprofen (Advil, Motrin) for 1 day(s) before surgery--unless otherwise directed  by surgeon.  Hold Naproxen (Aleve) for 4 days before surgery.    -  DO NOT take these medications the day of surgery:  Senna-docusate    -  PLEASE TAKE these medications the day of surgery:  None       How do I prepare myself?  - Please take 2 showers (one the night prior to surgery and one the morning of surgery) using Scrubcare or Hibiclens soap.    Use this soap only from the neck to your toes. Avoid genital area      Leave the soap on your skin for one minute--then rinse thoroughly.      You may use your own shampoo and conditioner. No other hair products.   - Please remove all jewelry and body piercings.  - No lotions, deodorants or fragrance.  - No makeup or fingernail polish.   - Bring your ID and insurance card.    -If you use a CPAP machine, please bring the CPAP machine, tubing, and mask to hospital.    -If you have a Deep Brain Stimulator, Spinal Cord Stimulator, or any Neuro Stimulator device---you must bring the remote control to the hospital.      ALL PATIENTS GOING HOME THE SAME DAY OF SURGERY ARE REQUIRED TO HAVE A RESPONSIBLE ADULT TO DRIVE AND BE IN ATTENDANCE WITH THEM FOR 24 HOURS FOLLOWING SURGERY.    Covid testing policy as of 12/06/2022  Your surgeon will notify and schedule you for a COVID test if one is needed before surgery--please direct any questions or COVID symptoms to your surgeon      Questions or Concerns:    - For any questions regarding the day of surgery or your hospital stay, please contact the Pre Admission Nursing Office at [phone].       - If you have health changes between today and your surgery, please call your surgeon.       - For questions after surgery, please call your surgeons office.           Current Visitor Guidelines    You may have 2 visitors in the pre op area.    Visiting hours: 8 a.m. to 8:30 p.m.    Patients confirmed or suspected to have symptoms of COVID 19 or flu:     No visitors allowed for adult patients.   Children (under age 18) can have 1 named  visitor.     People who are sick or showing symptoms of COVID 19 or flu:    Are not allowed to visit patients--we can only make exceptions in special situations.       Please follow these guidelines for your visit:          Please maintain social distance          Masking is optional--however at times you may be asked to wear a mask for the safety of yourself and others     Clean your hands with alcohol hand . Do this when you arrive at and leave the building and patient room,    And again after you touch your mask or anything in the room.     Go directly to and from the room you are visiting.     Stay in the patient s room during your visit. Limit going to other places in the hospital as much as possible     Leave bags and jackets at home or in the car.     For everyone s health, please don t come and go during your visit. That includes for smoking   during your visit.           OPTIMAL RECOVERY AFTER SURGERY        Begin hydrating yourself by drinking at least 8-10 glasses of clear liquids for 24 hours before surgery:      Suggested clear liquids:   Water    Clear Juices   Clear Broth   Non- carbonated beverages    (Crystal Light or Chip Aid)   Sodas    (Sprite, 7 up, ginger ale, seltzer)   Gatorade              Drink clear liquids up until 4 hours before your surgery.       We would like you to purchase a drink such as Gatorade or Ensure Clear (not the milkshake type).  Drink this before bedtime and the morning of surgery drink between 8-10 ounces or until you feel hydrated.        Keeping well hydrated leads to your veins being plump, you wake up faster, and you are less likely to be nauseated. Start drinking water as soon as you can after surgery and advance to clear liquids and food as tolerated.  IV fluids contain salt, drinking fluids will minimize the amount of IV fluids you need and decrease the amount of salt you get.                 The most common reason for the patient to be readmitted is  dehydration. Staying hydrated after you go home from the hospital is very important.  Ensure or Ensure Clear are good options to keep you hydrated.

## 2024-11-06 ENCOUNTER — ALLIED HEALTH/NURSE VISIT (OUTPATIENT)
Dept: RESEARCH | Facility: CLINIC | Age: 49
End: 2024-11-06
Payer: COMMERCIAL

## 2024-11-06 DIAGNOSIS — Z00.6 EXAMINATION OF PARTICIPANT IN CLINICAL TRIAL: Primary | ICD-10-CM

## 2024-11-06 LAB
ABO/RH(D): NORMAL
ANTIBODY SCREEN: NEGATIVE
SPECIMEN EXPIRATION DATE: NORMAL

## 2024-11-06 NOTE — PROGRESS NOTES
Surgery Clinical Trials Office Informed Consent Process Documentation      Cell [place] and Preparation for Surgery Branch Adoptive Cell Therapy Protocols (IRB [id number])    Protocol Version 02/01/2024 IRB Approval Date 02/06/2024    PI:  Frandy Kauffman MD    Date of Approach/Consent: 11/05/24    I talked with this patient today to about the Cell [place] study.    The subject was screened and meets all of the inclusion criteria and none of the exclusion criteria is met.      The subject was told:  -that the study involves research   -the purpose of the research study  -the expected duration of the study and the approximate number of subjects sought  -of procedures that are identified as experimental  -of reasonably foreseeable risks or discomforts to the subject  -of any benefits to the subject or others that may be expected from the research  -of alternative procedures and/or treatment  -how the confidentiality of records would be maintained  -whether or not compensation and medical treatments are available should injury occur as a result of the study  -who to contact if they have questions related to the research study or questions regarding research subjects' rights  -that participation is completely voluntary and that their decision to or not to participate will have no impact on their relationships with the N and the staff    No study procedures were completed prior to the consent being obtained.    The subject demonstrated an understanding of what the study involved.  Specifically, how this study differed from standard of care at our center and what was required of the subject as part of the study.    The subject reviewed the consent form and was given the opportunity to ask questions before signing.  Questions and concerns were answered by the study staff and/or study physician.    I confirm that a copy of the signed ICF was provided to the subject.  [x] Yes [] No    The consent form used was  e-consent? [x] Yes [] No     The subject required a legally-authorized representative (LAR) to sign on their behalf: [] Yes  [x] No   If yes, please record name of LAR:     Questions to Evaluate Subject Comprehension of Study:     Question: Adequate Response? If No, explain what actions were taken   What is being studied? [x] Yes  [] No   What kinds of risks are there? [x] Yes  [] No   Do you understand that you can withdraw consent at any time and for any reason while participating in the study? [x] Yes  [] No       Study Coordinators: Vivi Pinedo  ([phone])                                                                           : Aleja Hannah  ([phone])

## 2024-11-07 ENCOUNTER — ANCILLARY PROCEDURE (OUTPATIENT)
Dept: CT IMAGING | Facility: CLINIC | Age: 49
End: 2024-11-07
Attending: SURGERY
Payer: COMMERCIAL

## 2024-11-07 ENCOUNTER — LAB (OUTPATIENT)
Dept: LAB | Facility: CLINIC | Age: 49
End: 2024-11-07
Payer: COMMERCIAL

## 2024-11-07 DIAGNOSIS — Z01.818 PRE-OPERATIVE EXAMINATION: ICD-10-CM

## 2024-11-07 DIAGNOSIS — C19 MALIGNANT NEOPLASM OF RECTOSIGMOID JUNCTION (H): ICD-10-CM

## 2024-11-07 LAB
ALBUMIN SERPL BCG-MCNC: 3.6 G/DL (ref 3.5–5.2)
ALP SERPL-CCNC: 102 U/L (ref 40–150)
ALT SERPL W P-5'-P-CCNC: 11 U/L (ref 0–70)
ANION GAP SERPL CALCULATED.3IONS-SCNC: 9 MMOL/L (ref 7–15)
AST SERPL W P-5'-P-CCNC: 14 U/L (ref 0–45)
BASOPHILS # BLD AUTO: 0.1 10E3/UL (ref 0–0.2)
BASOPHILS NFR BLD AUTO: 1 %
BILIRUB SERPL-MCNC: 0.3 MG/DL
BUN SERPL-MCNC: 8.4 MG/DL (ref 6–20)
CALCIUM SERPL-MCNC: 8.6 MG/DL (ref 8.8–10.4)
CHLORIDE SERPL-SCNC: 104 MMOL/L (ref 98–107)
CREAT SERPL-MCNC: 0.72 MG/DL (ref 0.67–1.17)
EGFRCR SERPLBLD CKD-EPI 2021: >90 ML/MIN/1.73M2
EOSINOPHIL # BLD AUTO: 0.4 10E3/UL (ref 0–0.7)
EOSINOPHIL NFR BLD AUTO: 5 %
ERYTHROCYTE [DISTWIDTH] IN BLOOD BY AUTOMATED COUNT: 13.4 % (ref 10–15)
GLUCOSE SERPL-MCNC: 87 MG/DL (ref 70–99)
HCO3 SERPL-SCNC: 23 MMOL/L (ref 22–29)
HCT VFR BLD AUTO: 42 % (ref 40–53)
HGB BLD-MCNC: 14.4 G/DL (ref 13.3–17.7)
IMM GRANULOCYTES # BLD: 0 10E3/UL
IMM GRANULOCYTES NFR BLD: 0 %
LYMPHOCYTES # BLD AUTO: 2.2 10E3/UL (ref 0.8–5.3)
LYMPHOCYTES NFR BLD AUTO: 24 %
MCH RBC QN AUTO: 31.6 PG (ref 26.5–33)
MCHC RBC AUTO-ENTMCNC: 34.3 G/DL (ref 31.5–36.5)
MCV RBC AUTO: 92 FL (ref 78–100)
MONOCYTES # BLD AUTO: 0.7 10E3/UL (ref 0–1.3)
MONOCYTES NFR BLD AUTO: 7 %
NEUTROPHILS # BLD AUTO: 5.7 10E3/UL (ref 1.6–8.3)
NEUTROPHILS NFR BLD AUTO: 62 %
NRBC # BLD AUTO: 0 10E3/UL
NRBC BLD AUTO-RTO: 0 /100
PLATELET # BLD AUTO: 360 10E3/UL (ref 150–450)
POTASSIUM SERPL-SCNC: 4.1 MMOL/L (ref 3.4–5.3)
PREALB SERPL-MCNC: 18.7 MG/DL (ref 20–40)
PROT SERPL-MCNC: 6.6 G/DL (ref 6.4–8.3)
RBC # BLD AUTO: 4.55 10E6/UL (ref 4.4–5.9)
SODIUM SERPL-SCNC: 136 MMOL/L (ref 135–145)
WBC # BLD AUTO: 9.2 10E3/UL (ref 4–11)

## 2024-11-07 PROCEDURE — 36415 COLL VENOUS BLD VENIPUNCTURE: CPT | Performed by: PATHOLOGY

## 2024-11-07 PROCEDURE — 99000 SPECIMEN HANDLING OFFICE-LAB: CPT | Performed by: PATHOLOGY

## 2024-11-07 PROCEDURE — 84134 ASSAY OF PREALBUMIN: CPT | Performed by: SURGERY

## 2024-11-07 PROCEDURE — 85025 COMPLETE CBC W/AUTO DIFF WBC: CPT | Performed by: PATHOLOGY

## 2024-11-07 PROCEDURE — 80053 COMPREHEN METABOLIC PANEL: CPT | Performed by: PATHOLOGY

## 2024-11-07 PROCEDURE — 71260 CT THORAX DX C+: CPT | Mod: GC | Performed by: RADIOLOGY

## 2024-11-07 RX ORDER — IOPAMIDOL 755 MG/ML
114 INJECTION, SOLUTION INTRAVASCULAR ONCE
Status: COMPLETED | OUTPATIENT
Start: 2024-11-07 | End: 2024-11-07

## 2024-11-07 RX ADMIN — IOPAMIDOL 114 ML: 755 INJECTION, SOLUTION INTRAVASCULAR at 15:26

## 2024-11-07 NOTE — DISCHARGE INSTRUCTIONS

## 2024-11-08 ENCOUNTER — TEAM CONFERENCE (OUTPATIENT)
Dept: SURGERY | Facility: CLINIC | Age: 49
End: 2024-11-08
Payer: COMMERCIAL

## 2024-11-08 NOTE — CONFIDENTIAL NOTE
COLON AND RECTAL SURGERY HUDDLE:    Patient was reviewed in preparation for their surgery the following was reviewed and has been completed:    Surgeon: Dr. Frandy Kauffman    Surgery & Date: LAR 11/21    Last MD Note: reviewed    Anesthesia Type: General    Other Providers: No    PAC: Yes completed    WOC: no    Labs: Yes    Bowel Prep: Yes     Packet: Yes    Imaging: No    Post-Op Appointments: Yes    Pre op soap: Yes Questions about shower: no    Is patient on TPN?: No   If yes, I contacted the TPN pharmacist by paging the  pharmacy at [phone] or calling [phone]. I also contacted Cookie PARKER with inpatient colon and rectal team.     Pre op call complete: Yes

## 2024-11-11 ENCOUNTER — HOSPITAL ENCOUNTER (OUTPATIENT)
Facility: CLINIC | Age: 49
Discharge: HOME | End: 2024-11-11
Attending: INTERNAL MEDICINE | Admitting: INTERNAL MEDICINE
Payer: COMMERCIAL

## 2024-11-11 ENCOUNTER — ANESTHESIA (OUTPATIENT)
Dept: GASTROENTEROLOGY | Facility: CLINIC | Age: 49
End: 2024-11-11
Payer: COMMERCIAL

## 2024-11-11 VITALS
HEIGHT: 67 IN | HEART RATE: 72 BPM | RESPIRATION RATE: 14 BRPM | WEIGHT: 231 LBS | OXYGEN SATURATION: 97 % | DIASTOLIC BLOOD PRESSURE: 93 MMHG | SYSTOLIC BLOOD PRESSURE: 133 MMHG | BODY MASS INDEX: 36.26 KG/M2

## 2024-11-11 LAB — COLONOSCOPY: NORMAL

## 2024-11-11 PROCEDURE — 88341 IMHCHEM/IMCYTCHM EA ADD ANTB: CPT | Mod: TC | Performed by: INTERNAL MEDICINE

## 2024-11-11 PROCEDURE — 370N000017 HC ANESTHESIA TECHNICAL FEE, PER MIN: Performed by: INTERNAL MEDICINE

## 2024-11-11 PROCEDURE — 250N000011 HC RX IP 250 OP 636: Performed by: NURSE ANESTHETIST, CERTIFIED REGISTERED

## 2024-11-11 PROCEDURE — 88305 TISSUE EXAM BY PATHOLOGIST: CPT | Mod: 26 | Performed by: PATHOLOGY

## 2024-11-11 PROCEDURE — 88342 IMHCHEM/IMCYTCHM 1ST ANTB: CPT | Mod: 26 | Performed by: PATHOLOGY

## 2024-11-11 PROCEDURE — 88341 IMHCHEM/IMCYTCHM EA ADD ANTB: CPT | Mod: 26 | Performed by: PATHOLOGY

## 2024-11-11 PROCEDURE — 45381 COLONOSCOPY SUBMUCOUS NJX: CPT | Performed by: INTERNAL MEDICINE

## 2024-11-11 PROCEDURE — 250N000009 HC RX 250: Performed by: NURSE ANESTHETIST, CERTIFIED REGISTERED

## 2024-11-11 PROCEDURE — 45380 COLONOSCOPY AND BIOPSY: CPT | Performed by: INTERNAL MEDICINE

## 2024-11-11 PROCEDURE — 258N000003 HC RX IP 258 OP 636: Performed by: NURSE ANESTHETIST, CERTIFIED REGISTERED

## 2024-11-11 PROCEDURE — 999N000010 HC STATISTIC ANES STAT CODE-CRNA PER MINUTE: Performed by: INTERNAL MEDICINE

## 2024-11-11 RX ORDER — PROPOFOL 10 MG/ML
INJECTION, EMULSION INTRAVENOUS CONTINUOUS PRN
Status: DISCONTINUED | OUTPATIENT
Start: 2024-11-11 | End: 2024-11-11

## 2024-11-11 RX ORDER — PROPOFOL 10 MG/ML
INJECTION, EMULSION INTRAVENOUS PRN
Status: DISCONTINUED | OUTPATIENT
Start: 2024-11-11 | End: 2024-11-11

## 2024-11-11 RX ORDER — SODIUM CHLORIDE 9 MG/ML
INJECTION, SOLUTION INTRAVENOUS CONTINUOUS PRN
Status: DISCONTINUED | OUTPATIENT
Start: 2024-11-11 | End: 2024-11-11

## 2024-11-11 RX ORDER — LIDOCAINE HYDROCHLORIDE 20 MG/ML
INJECTION, SOLUTION INFILTRATION; PERINEURAL PRN
Status: DISCONTINUED | OUTPATIENT
Start: 2024-11-11 | End: 2024-11-11

## 2024-11-11 RX ORDER — ONDANSETRON 2 MG/ML
INJECTION INTRAMUSCULAR; INTRAVENOUS PRN
Status: DISCONTINUED | OUTPATIENT
Start: 2024-11-11 | End: 2024-11-11

## 2024-11-11 RX ADMIN — ONDANSETRON 4 MG: 2 INJECTION INTRAMUSCULAR; INTRAVENOUS at 15:08

## 2024-11-11 RX ADMIN — PROPOFOL 30 MG: 10 INJECTION, EMULSION INTRAVENOUS at 15:13

## 2024-11-11 RX ADMIN — PROPOFOL 30 MG: 10 INJECTION, EMULSION INTRAVENOUS at 15:11

## 2024-11-11 RX ADMIN — PROPOFOL 30 MG: 10 INJECTION, EMULSION INTRAVENOUS at 15:10

## 2024-11-11 RX ADMIN — LIDOCAINE HYDROCHLORIDE 60 MG: 20 INJECTION, SOLUTION INFILTRATION; PERINEURAL at 15:08

## 2024-11-11 RX ADMIN — SODIUM CHLORIDE: 9 INJECTION, SOLUTION INTRAVENOUS at 15:04

## 2024-11-11 RX ADMIN — PROPOFOL 40 MG: 10 INJECTION, EMULSION INTRAVENOUS at 15:12

## 2024-11-11 RX ADMIN — PROPOFOL 250 MCG/KG/MIN: 10 INJECTION, EMULSION INTRAVENOUS at 15:08

## 2024-11-11 ASSESSMENT — ENCOUNTER SYMPTOMS: SEIZURES: 0

## 2024-11-11 ASSESSMENT — ACTIVITIES OF DAILY LIVING (ADL)
ADLS_ACUITY_SCORE: 0

## 2024-11-11 ASSESSMENT — LIFESTYLE VARIABLES: TOBACCO_USE: 1

## 2024-11-11 NOTE — H&P
[facility]  Pre-Endoscopy History and Physical     Bella Quintero MRN# [id number]   YOB: 1975 Age: 49 year old     Date of Procedure: 11/11/2024  Primary care provider: Nyla Pastor  Type of Endoscopy: colonoscopy  Reason for Procedure: H/O Colon CA  Type of Anesthesia Anticipated: MAC    HPI:    Bella is a 49 year old male who will be undergoing the above procedure.      A history and physical has been performed. The patient's medications and allergies have been reviewed. The risks and benefits of the procedure and the sedation options and risks were discussed with the patient.  All questions were answered and informed consent was obtained.      He denies a personal or family history of anesthesia complications or bleeding disorders.     Allergies   Allergen Reactions    Codeine Swelling and Itching     PN: LW Reaction: EDEMA, GENERALIZED   Tolerated hydromorphone 5/1/24    Penicillins Other (See Comments)     Swelling, burning feeling in hands and feet. , PN: LW Reaction: EDEMA, GENERALIZED        Prior to Admission Medications   Prescriptions Last Dose Informant Patient Reported? Taking?   atorvastatin (LIPITOR) 10 MG tablet   No No   Sig: Take 1 tablet (10 mg) by mouth daily   Patient not taking: Reported on 11/4/2024   atorvastatin (LIPITOR) 10 MG tablet More than a month  No Yes   Sig: Take 2 tablets (20 mg) by mouth daily   ketoconazole (NIZORAL) 2 % external shampoo   No No   Sig: Apply topically daily as needed for itching or irritation   magnesium citrate 1.745 GM/30ML solution   No No   Sig: Drink 1 bottle at 8pm the night before surgery   Patient not taking: Reported on 11/4/2024   metroNIDAZOLE (FLAGYL) 500 MG tablet   No No   Sig: Take 1 tablet (500 mg) by mouth every 6 hours. At 8:00 am, 2:00 pm, 8:00 pm the day prior to your surgery with neomycin and zofran.   neomycin (MYCIFRADIN) 500 MG tablet   No No   Sig: Take 2 tablets (1,000 mg) by mouth  every 6 hours. At 8:00 am, 2:00 pm, 8:00 pm the day prior to your surgery with flagyl and zofran.   Patient not taking: Reported on 11/4/2024   ondansetron (ZOFRAN) 4 MG tablet   No No   Sig: Take 1 tablet (4 mg) by mouth every 6 hours. At 8:00 am, 2:00 pm, 8:00 pm the day prior to your surgery with neomycin and flagyl.   Patient not taking: Reported on 11/4/2024   polyethylene glycol (MIRALAX) 17 GM/Dose powder Past Week  No Yes   Sig: Take 17 g (1 Capful) by mouth daily.   polyethylene glycol (MIRALAX) 17 GM/Dose powder   No No   Sig: Please take 238 grams mixed with 64 oz of Gatorade at 4pm the night before surgery   Patient not taking: Reported on 11/4/2024   senna (SENOKOT) 8.6 MG tablet Past Week  No Yes   Sig: Take 1 tablet by mouth daily as needed for constipation.      Facility-Administered Medications: None       Patient Active Problem List   Diagnosis    Acute colitis    Left lower quadrant abdominal pain    Accidental fentanyl overdose (H)    Acute respiratory failure with hypoxia (H)    Aspiration pneumonitis (H)    Closed fracture of distal phalanx or phalanges of hand    Hypotension    Scabies        Past Medical History:   Diagnosis Date    Bilateral low back pain with right-sided sciatica     Diverticulitis of colon 05/01/2024    History of alcohol use disorder     HLD (hyperlipidemia)     Obesity     Tobacco use disorder         No past surgical history on file.    Social History     Tobacco Use    Smoking status: Every Day     Current packs/day: 1.00     Average packs/day: 1 pack/day for 30.3 years (30.3 ttl pk-yrs)     Types: Cigarettes     Start date: 7/9/1994    Smokeless tobacco: Never    Tobacco comments:     Revisit as pt is willing   Substance Use Topics    Alcohol use: Not Currently     Alcohol/week: 15.0 standard drinks of alcohol     Types: 15 Glasses of wine per week     Comment: 11/4/24. Minimal drinking currently. 3 bottles of wine per week. H/o 1L EtOH every other day, self decreased  "several years ago.       Family History   Problem Relation Age of Onset    Colon Polyps Mother     Alcoholism Father     Diabetes Father     Colon Cancer No family hx of     Anesthesia Reaction No family hx of     Deep Vein Thrombosis (DVT) No family hx of     Clotting Disorder No family hx of        REVIEW OF SYSTEMS:     5 point ROS negative except as noted above in HPI, including Gen., Resp., CV, GI &  system review.      PHYSICAL EXAM:   BP (!) 134/94   Pulse 74   Ht 1.702 m (5' 7\")   Wt 104.8 kg (231 lb)   SpO2 98%   BMI 36.18 kg/m   Estimated body mass index is 36.18 kg/m  as calculated from the following:    Height as of this encounter: 1.702 m (5' 7\").    Weight as of this encounter: 104.8 kg (231 lb).   GENERAL APPEARANCE: healthy, alert, and no distress  MENTAL STATUS: alert  AIRWAY EXAM: Mallampatti Class I (visualization of the soft palate, fauces, uvula, anterior and posterior pillars)  RESP: lungs clear to auscultation - no rales, rhonchi or wheezes  CV: normal S1 S2, no S3 or S4      DIAGNOSTICS:    Not indicated      IMPRESSION   ASA Class 2 - Mild systemic disease        PLAN:       Plan for colonoscopy. We discussed the risks, benefits and alternatives and the patient wished to proceed.    The above has been forwarded to the consulting provider.      Signed Electronically by: Ranjan Verdin MD,MD  November 11, 2024      [facility]  Ph: [phone] Fax: [phone]    "

## 2024-11-11 NOTE — ANESTHESIA CARE TRANSFER NOTE
Patient: Bella Quintero    Procedure: Procedure(s):  COLONOSCOPY, WITH POLYPECTOMY AND BIOPSY  TATTOOING, WITH COLONOSCOPY       Diagnosis: Diverticulitis of colon [K57.32]  Diagnosis Additional Information: No value filed.    Anesthesia Type:   MAC     Note:    Oropharynx: oropharynx clear of all foreign objects and spontaneously breathing  Level of Consciousness: awake  Oxygen Supplementation: face mask    Independent Airway: airway patency satisfactory and stable  Dentition: dentition unchanged  Vital Signs Stable: post-procedure vital signs reviewed and stable  Report to RN Given: handoff report given  Patient transferred to: PACU    Handoff Report: Identifed the Patient, Identified the Reponsible Provider, Reviewed the pertinent medical history, Discussed the surgical course, Reviewed Intra-OP anesthesia mangement and issues during anesthesia, Set expectations for post-procedure period and Allowed opportunity for questions and acknowledgement of understanding      Vitals:  Vitals Value Taken Time   BP     Temp     Pulse     Resp     SpO2         Electronically Signed By: RACHEL Fonseca CRNA  November 11, 2024  3:28 PM

## 2024-11-11 NOTE — ANESTHESIA PREPROCEDURE EVALUATION
Anesthesia Pre-Procedure Evaluation    Patient: Bella Quintero   MRN: [id number] : 1975        Procedure : Procedure(s):  Colonoscopy          Past Medical History:   Diagnosis Date    Bilateral low back pain with right-sided sciatica     Diverticulitis of colon 2024    History of alcohol use disorder     HLD (hyperlipidemia)     Obesity     Tobacco use disorder       No past surgical history on file.   Allergies   Allergen Reactions    Codeine Swelling and Itching     PN: LW Reaction: EDEMA, GENERALIZED   Tolerated hydromorphone 24    Penicillins Other (See Comments)     Swelling, burning feeling in hands and feet. , PN: LW Reaction: EDEMA, GENERALIZED      Social History     Tobacco Use    Smoking status: Every Day     Current packs/day: 1.00     Average packs/day: 1 pack/day for 30.3 years (30.3 ttl pk-yrs)     Types: Cigarettes     Start date: 1994    Smokeless tobacco: Never    Tobacco comments:     Revisit as pt is willing   Substance Use Topics    Alcohol use: Not Currently     Alcohol/week: 15.0 standard drinks of alcohol     Types: 15 Glasses of wine per week     Comment: 24. Minimal drinking currently. 3 bottles of wine per week. H/o 1L EtOH every other day, self decreased several years ago.      Wt Readings from Last 1 Encounters:   24 104.8 kg (231 lb)        Anesthesia Evaluation   Pt has not had prior anesthetic         ROS/MED HX  ENT/Pulmonary:     (+)                tobacco use, Current use,                       Neurologic:  - neg neurologic ROS  (-) no seizures and no CVA   Cardiovascular:     (+) Dyslipidemia - -   -  - -                                 Previous cardiac testing   Echo: Date: Results:    Stress Test:  Date: Results:    ECG Reviewed:  Date: 23 Results:  Sinus rhythm   Prolonged QT   Abnormal ECG     Cath:  Date: Results:      METS/Exercise Tolerance: 4 - Raking leaves, gardening    Hematologic:  - neg hematologic  ROS    "  Musculoskeletal: Comment: Back pain       GI/Hepatic: Comment: Diverticulitis    Colonic mass     (-) GERD   Renal/Genitourinary:  - neg Renal ROS     Endo:     (+)               Obesity,       Psychiatric/Substance Use:     (+)   alcohol abuse  Recreational drug usage: Cannabis (h/o fentanyl overdose).    Infectious Disease:  - neg infectious disease ROS     Malignancy:  - neg malignancy ROS     Other:  - neg other ROS          Physical Exam    Airway        Mallampati: III   TM distance: > 3 FB   Neck ROM: full   Mouth opening: > 3 cm    Respiratory Devices and Support         Dental  no notable dental history     (+) Modest Abnormalities - crowns, retainers, 1 or 2 missing teeth      Cardiovascular          Rhythm and rate: regular     Pulmonary           (+) decreased breath sounds           OUTSIDE LABS:  CBC:   Lab Results   Component Value Date    WBC 9.2 11/07/2024    WBC 11.2 (H) 10/13/2024    HGB 14.4 11/07/2024    HGB 14.8 10/13/2024    HCT 42.0 11/07/2024    HCT 45.9 10/13/2024     11/07/2024     10/13/2024     BMP:   Lab Results   Component Value Date     11/07/2024     10/13/2024    POTASSIUM 4.1 11/07/2024    POTASSIUM 4.4 10/13/2024    CHLORIDE 104 11/07/2024    CHLORIDE 101 10/13/2024    CO2 23 11/07/2024    CO2 26 10/13/2024    BUN 8.4 11/07/2024    BUN 10.8 10/13/2024    CR 0.72 11/07/2024    CR 0.75 10/13/2024    GLC 87 11/07/2024     (H) 10/13/2024     COAGS: No results found for: \"PTT\", \"INR\", \"FIBR\"  POC: No results found for: \"BGM\", \"HCG\", \"HCGS\"  HEPATIC:   Lab Results   Component Value Date    ALBUMIN 3.6 11/07/2024    PROTTOTAL 6.6 11/07/2024    ALT 11 11/07/2024    AST 14 11/07/2024    ALKPHOS 102 11/07/2024    BILITOTAL 0.3 11/07/2024     OTHER:   Lab Results   Component Value Date    LACT 1.2 08/23/2024    NORMA 8.6 (L) 11/07/2024    LIPASE 19 10/13/2024    TSH 1.22 07/09/2024       Anesthesia Plan    ASA Status:  3       Anesthesia Type: MAC.          " "    Consents    Anesthesia Plan(s) and associated risks, benefits, and realistic alternatives discussed. Questions answered and patient/representative(s) expressed understanding.     - Discussed:     - Discussed with:  Patient            Postoperative Care       PONV prophylaxis: Ondansetron (or other 5HT-3)     Comments:               Robert Paulino MD    I have reviewed the pertinent notes and labs in the chart from the past 30 days and (re)examined the patient.  Any updates or changes from those notes are reflected in this note.                         # Obesity: Estimated body mass index is 36.18 kg/m  as calculated from the following:    Height as of this encounter: 1.702 m (5' 7\").    Weight as of this encounter: 104.8 kg (231 lb).             "

## 2024-11-11 NOTE — ANESTHESIA POSTPROCEDURE EVALUATION
Patient: Bella Quintero    Procedure: Procedure(s):  COLONOSCOPY, WITH POLYPECTOMY AND BIOPSY  TATTOOING, WITH COLONOSCOPY       Anesthesia Type:  MAC    Note:     Postop Pain Control: Uneventful            Sign Out: Well controlled pain   PONV:    Neuro/Psych: Uneventful            Sign Out: Acceptable/Baseline neuro status   Airway/Respiratory: Uneventful            Sign Out: Acceptable/Baseline resp. status   CV/Hemodynamics: Uneventful            Sign Out: Acceptable CV status; No obvious hypovolemia; No obvious fluid overload   Other NRE:    DID A NON-ROUTINE EVENT OCCUR?            Last vitals:  Vitals Value Taken Time   /93 11/11/24 1540   Temp     Pulse 72 11/11/24 1541   Resp 14 11/11/24 1541   SpO2 97 % 11/11/24 1540       Electronically Signed By: Robert Paulino MD  November 11, 2024  5:35 PM

## 2024-11-14 ENCOUNTER — PATIENT OUTREACH (OUTPATIENT)
Dept: GASTROENTEROLOGY | Facility: CLINIC | Age: 49
End: 2024-11-14
Payer: COMMERCIAL

## 2024-11-14 LAB
PATH REPORT.ADDENDUM SPEC: ABNORMAL
PATH REPORT.COMMENTS IMP SPEC: ABNORMAL
PATH REPORT.COMMENTS IMP SPEC: YES
PATH REPORT.FINAL DX SPEC: ABNORMAL
PATH REPORT.GROSS SPEC: ABNORMAL
PATH REPORT.MICROSCOPIC SPEC OTHER STN: ABNORMAL
PATH REPORT.RELEVANT HX SPEC: ABNORMAL
PHOTO IMAGE: ABNORMAL

## 2024-11-15 ENCOUNTER — ALLIED HEALTH/NURSE VISIT (OUTPATIENT)
Dept: RESEARCH | Facility: CLINIC | Age: 49
End: 2024-11-15
Payer: COMMERCIAL

## 2024-11-15 DIAGNOSIS — Z00.6 RESEARCH EXAM: Primary | ICD-10-CM

## 2024-11-15 NOTE — PROGRESS NOTES
.Surgery Clinical Trials Office Informed Consent Process Documentation      An Open-Label,  Clinical Trial To Test The Safety And Feasibility Of Intestinal Microbiota Transplantation In Patients Undergoing Colon Resection (IRB [id number])    Protocol Version DateIRB Approved Version: 8/21/2024  10/28/2024     PI:  Gonzalez Gilman MD    Date of Approach/Consent: 11/15/24    I talked with this patient today to about the IMT study.    The subject was screened and meets all of the inclusion criteria and none of the exclusion criteria is met.      The subject was told:  -that the study involves research   -the purpose of the research study  -the expected duration of the study and the approximate number of subjects sought  -of procedures that are identified as experimental  -of reasonably foreseeable risks or discomforts to the subject  -of any benefits to the subject or others that may be expected from the research  -of alternative procedures and/or treatment  -how the confidentiality of records would be maintained  -whether or not compensation and medical treatments are available should injury occur as a result of the study  -who to contact if they have questions related to the research study or questions regarding research subjects' rights  -that participation is completely voluntary and that their decision to or not to participate will have no impact on their relationships with the N and the staff    No study procedures were completed prior to the consent being obtained.    The subject demonstrated an understanding of what the study involved.  Specifically, how this study differed from standard of care at our center and what was required of the subject as part of the study.    The subject reviewed the consent form and was given the opportunity to ask questions before signing.  Questions and concerns were answered by the study staff and/or study physician.    I confirm that a copy of the signed ICF was  provided to the subject.  [x] Yes    The subject was offered a copy of the signed informed consent but declined. [] Yes [x] No    The consent form used was e-consent? [] Yes [x] No      The subject required a legally-authorized representative (LAR) to sign on their behalf: [] Yes  [x] No   If yes, please record name of LAR:     Questions to Evaluate Subject Comprehension of Study:    Question: Adequate Response? If No, explain what actions were taken   What is being studied? [x] Yes  [] No   What kinds of risks are there? [x] Yes  [] No   Do you understand that you can withdraw consent at any time and for any reason while participating in the study? [x] Yes  [] No       Study Coordinators: Vivi Pinedo  ([phone])      Reji Beck ([phone])                                                                           : Aleja Hannah  ([phone])

## 2024-11-18 DIAGNOSIS — C19 MALIGNANT NEOPLASM OF RECTOSIGMOID JUNCTION (H): Primary | ICD-10-CM

## 2024-11-19 DIAGNOSIS — C19 MALIGNANT NEOPLASM OF RECTOSIGMOID JUNCTION (H): Primary | ICD-10-CM

## 2024-11-20 ENCOUNTER — ANESTHESIA EVENT (OUTPATIENT)
Dept: SURGERY | Facility: CLINIC | Age: 49
End: 2024-11-20
Payer: COMMERCIAL

## 2024-11-21 ENCOUNTER — HOSPITAL ENCOUNTER (INPATIENT)
Facility: CLINIC | Age: 49
End: 2024-11-21
Attending: STUDENT IN AN ORGANIZED HEALTH CARE EDUCATION/TRAINING PROGRAM | Admitting: SURGERY
Payer: COMMERCIAL

## 2024-11-21 ENCOUNTER — LAB REQUISITION (OUTPATIENT)
Dept: LAB | Facility: CLINIC | Age: 49
End: 2024-11-21

## 2024-11-21 ENCOUNTER — ANESTHESIA (OUTPATIENT)
Dept: SURGERY | Facility: CLINIC | Age: 49
End: 2024-11-21
Payer: COMMERCIAL

## 2024-11-21 DIAGNOSIS — G89.18 ACUTE POST-OPERATIVE PAIN: Primary | ICD-10-CM

## 2024-11-21 DIAGNOSIS — C19 MALIGNANT NEOPLASM OF RECTOSIGMOID JUNCTION (H): ICD-10-CM

## 2024-11-21 DIAGNOSIS — Z93.2 ILEOSTOMY IN PLACE (H): ICD-10-CM

## 2024-11-21 DIAGNOSIS — G47.30 SLEEP APNEA, UNSPECIFIED TYPE: ICD-10-CM

## 2024-11-21 LAB
ALBUMIN SERPL BCG-MCNC: 3.7 G/DL (ref 3.5–5.2)
ALP SERPL-CCNC: 116 U/L (ref 40–150)
ALT SERPL W P-5'-P-CCNC: 12 U/L (ref 0–70)
ANION GAP SERPL CALCULATED.3IONS-SCNC: 9 MMOL/L (ref 7–15)
AST SERPL W P-5'-P-CCNC: 18 U/L (ref 0–45)
BASOPHILS # BLD AUTO: 0.1 10E3/UL (ref 0–0.2)
BASOPHILS NFR BLD AUTO: 1 %
BILIRUB SERPL-MCNC: 0.5 MG/DL
BLD PROD TYP BPU: NORMAL
BLD PROD TYP BPU: NORMAL
BLOOD COMPONENT TYPE: NORMAL
BLOOD COMPONENT TYPE: NORMAL
BUN SERPL-MCNC: 9.1 MG/DL (ref 6–20)
CALCIUM SERPL-MCNC: 9 MG/DL (ref 8.8–10.4)
CHLORIDE SERPL-SCNC: 102 MMOL/L (ref 98–107)
CODING SYSTEM: NORMAL
CODING SYSTEM: NORMAL
CREAT SERPL-MCNC: 0.77 MG/DL (ref 0.67–1.17)
CROSSMATCH: NORMAL
CROSSMATCH: NORMAL
EGFRCR SERPLBLD CKD-EPI 2021: >90 ML/MIN/1.73M2
EOSINOPHIL # BLD AUTO: 0.5 10E3/UL (ref 0–0.7)
EOSINOPHIL NFR BLD AUTO: 3 %
ERYTHROCYTE [DISTWIDTH] IN BLOOD BY AUTOMATED COUNT: 13.8 % (ref 10–15)
GLUCOSE BLDC GLUCOMTR-MCNC: 136 MG/DL (ref 70–99)
GLUCOSE SERPL-MCNC: 141 MG/DL (ref 70–99)
HCO3 SERPL-SCNC: 25 MMOL/L (ref 22–29)
HCT VFR BLD AUTO: 42.2 % (ref 40–53)
HGB BLD-MCNC: 14 G/DL (ref 13.3–17.7)
IMM GRANULOCYTES # BLD: 0.1 10E3/UL
IMM GRANULOCYTES NFR BLD: 1 %
LYMPHOCYTES # BLD AUTO: 1.9 10E3/UL (ref 0.8–5.3)
LYMPHOCYTES NFR BLD AUTO: 13 %
MAGNESIUM SERPL-MCNC: 2.3 MG/DL (ref 1.7–2.3)
MCH RBC QN AUTO: 30.8 PG (ref 26.5–33)
MCHC RBC AUTO-ENTMCNC: 33.2 G/DL (ref 31.5–36.5)
MCV RBC AUTO: 93 FL (ref 78–100)
MONOCYTES # BLD AUTO: 1.1 10E3/UL (ref 0–1.3)
MONOCYTES NFR BLD AUTO: 8 %
NEUTROPHILS # BLD AUTO: 10.6 10E3/UL (ref 1.6–8.3)
NEUTROPHILS NFR BLD AUTO: 74 %
NRBC # BLD AUTO: 0 10E3/UL
NRBC BLD AUTO-RTO: 0 /100
PATH REPORT.COMMENTS IMP SPEC: NORMAL
PATH REPORT.INTRAOP OBS SPEC DOC: NORMAL
PLATELET # BLD AUTO: 361 10E3/UL (ref 150–450)
POTASSIUM SERPL-SCNC: 4.5 MMOL/L (ref 3.4–5.3)
PREALB SERPL-MCNC: 21.1 MG/DL (ref 20–40)
PROT SERPL-MCNC: 6.8 G/DL (ref 6.4–8.3)
RBC # BLD AUTO: 4.55 10E6/UL (ref 4.4–5.9)
SODIUM SERPL-SCNC: 136 MMOL/L (ref 135–145)
UNIT ABO/RH: NORMAL
UNIT ABO/RH: NORMAL
UNIT NUMBER: NORMAL
UNIT NUMBER: NORMAL
UNIT STATUS: NORMAL
UNIT STATUS: NORMAL
UNIT TYPE ISBT: 5100
UNIT TYPE ISBT: 5100
WBC # BLD AUTO: 14.2 10E3/UL (ref 4–11)

## 2024-11-21 PROCEDURE — 250N000011 HC RX IP 250 OP 636: Performed by: ANESTHESIOLOGY

## 2024-11-21 PROCEDURE — 250N000011 HC RX IP 250 OP 636: Performed by: STUDENT IN AN ORGANIZED HEALTH CARE EDUCATION/TRAINING PROGRAM

## 2024-11-21 PROCEDURE — 250N000013 HC RX MED GY IP 250 OP 250 PS 637: Performed by: SURGERY

## 2024-11-21 PROCEDURE — 300N000004 RESEARCH KIT COLLECTION: Performed by: STUDENT IN AN ORGANIZED HEALTH CARE EDUCATION/TRAINING PROGRAM

## 2024-11-21 PROCEDURE — 120N000002 HC R&B MED SURG/OB UMMC

## 2024-11-21 PROCEDURE — 85025 COMPLETE CBC W/AUTO DIFF WBC: CPT | Performed by: SURGERY

## 2024-11-21 PROCEDURE — 272N000001 HC OR GENERAL SUPPLY STERILE: Performed by: STUDENT IN AN ORGANIZED HEALTH CARE EDUCATION/TRAINING PROGRAM

## 2024-11-21 PROCEDURE — 0WQF0ZZ REPAIR ABDOMINAL WALL, OPEN APPROACH: ICD-10-PCS | Performed by: SURGERY

## 2024-11-21 PROCEDURE — C9290 INJ, BUPIVACAINE LIPOSOME: HCPCS

## 2024-11-21 PROCEDURE — 250N000011 HC RX IP 250 OP 636: Performed by: INTERNAL MEDICINE

## 2024-11-21 PROCEDURE — 258N000003 HC RX IP 258 OP 636: Performed by: INTERNAL MEDICINE

## 2024-11-21 PROCEDURE — 52005 CYSTO W/URTRL CATHJ: CPT | Mod: GC | Performed by: UROLOGY

## 2024-11-21 PROCEDURE — 84155 ASSAY OF PROTEIN SERUM: CPT | Performed by: SURGERY

## 2024-11-21 PROCEDURE — 250N000011 HC RX IP 250 OP 636: Performed by: SURGERY

## 2024-11-21 PROCEDURE — 0WJP4ZZ INSPECTION OF GASTROINTESTINAL TRACT, PERCUTANEOUS ENDOSCOPIC APPROACH: ICD-10-PCS | Performed by: SURGERY

## 2024-11-21 PROCEDURE — C1758 CATHETER, URETERAL: HCPCS | Performed by: STUDENT IN AN ORGANIZED HEALTH CARE EDUCATION/TRAINING PROGRAM

## 2024-11-21 PROCEDURE — 258N000003 HC RX IP 258 OP 636: Performed by: ANESTHESIOLOGY

## 2024-11-21 PROCEDURE — 0D1B0Z4 BYPASS ILEUM TO CUTANEOUS, OPEN APPROACH: ICD-10-PCS | Performed by: SURGERY

## 2024-11-21 PROCEDURE — 36415 COLL VENOUS BLD VENIPUNCTURE: CPT | Performed by: STUDENT IN AN ORGANIZED HEALTH CARE EDUCATION/TRAINING PROGRAM

## 2024-11-21 PROCEDURE — 250N000009 HC RX 250: Performed by: INTERNAL MEDICINE

## 2024-11-21 PROCEDURE — 250N000011 HC RX IP 250 OP 636

## 2024-11-21 PROCEDURE — C1769 GUIDE WIRE: HCPCS | Performed by: STUDENT IN AN ORGANIZED HEALTH CARE EDUCATION/TRAINING PROGRAM

## 2024-11-21 PROCEDURE — 88331 PATH CONSLTJ SURG 1 BLK 1SPC: CPT | Mod: TC | Performed by: SURGERY

## 2024-11-21 PROCEDURE — 86923 COMPATIBILITY TEST ELECTRIC: CPT | Performed by: REGISTERED NURSE

## 2024-11-21 PROCEDURE — 44310 ILEOSTOMY/JEJUNOSTOMY: CPT | Mod: GC | Performed by: SURGERY

## 2024-11-21 PROCEDURE — 360N000080 HC SURGERY LEVEL 7, PER MIN: Performed by: STUDENT IN AN ORGANIZED HEALTH CARE EDUCATION/TRAINING PROGRAM

## 2024-11-21 PROCEDURE — 8E0W4CZ ROBOTIC ASSISTED PROCEDURE OF TRUNK REGION, PERCUTANEOUS ENDOSCOPIC APPROACH: ICD-10-PCS | Performed by: SURGERY

## 2024-11-21 PROCEDURE — 0DTN0ZZ RESECTION OF SIGMOID COLON, OPEN APPROACH: ICD-10-PCS | Performed by: SURGERY

## 2024-11-21 PROCEDURE — 258N000003 HC RX IP 258 OP 636: Performed by: SURGERY

## 2024-11-21 PROCEDURE — 250N000009 HC RX 250: Performed by: STUDENT IN AN ORGANIZED HEALTH CARE EDUCATION/TRAINING PROGRAM

## 2024-11-21 PROCEDURE — 999N000141 HC STATISTIC PRE-PROCEDURE NURSING ASSESSMENT: Performed by: STUDENT IN AN ORGANIZED HEALTH CARE EDUCATION/TRAINING PROGRAM

## 2024-11-21 PROCEDURE — 88304 TISSUE EXAM BY PATHOLOGIST: CPT | Mod: 26 | Performed by: STUDENT IN AN ORGANIZED HEALTH CARE EDUCATION/TRAINING PROGRAM

## 2024-11-21 PROCEDURE — 83735 ASSAY OF MAGNESIUM: CPT | Performed by: SURGERY

## 2024-11-21 PROCEDURE — 88309 TISSUE EXAM BY PATHOLOGIST: CPT | Mod: 26 | Performed by: STUDENT IN AN ORGANIZED HEALTH CARE EDUCATION/TRAINING PROGRAM

## 2024-11-21 PROCEDURE — 44147 PARTIAL REMOVAL OF COLON: CPT | Mod: 22 | Performed by: SURGERY

## 2024-11-21 PROCEDURE — 370N000017 HC ANESTHESIA TECHNICAL FEE, PER MIN: Performed by: STUDENT IN AN ORGANIZED HEALTH CARE EDUCATION/TRAINING PROGRAM

## 2024-11-21 PROCEDURE — 250N000013 HC RX MED GY IP 250 OP 250 PS 637: Performed by: STUDENT IN AN ORGANIZED HEALTH CARE EDUCATION/TRAINING PROGRAM

## 2024-11-21 PROCEDURE — 0DTP0ZZ RESECTION OF RECTUM, OPEN APPROACH: ICD-10-PCS | Performed by: SURGERY

## 2024-11-21 PROCEDURE — 250N000009 HC RX 250: Performed by: ANESTHESIOLOGY

## 2024-11-21 PROCEDURE — 80051 ELECTROLYTE PANEL: CPT | Performed by: SURGERY

## 2024-11-21 PROCEDURE — 258N000003 HC RX IP 258 OP 636: Performed by: STUDENT IN AN ORGANIZED HEALTH CARE EDUCATION/TRAINING PROGRAM

## 2024-11-21 PROCEDURE — 0DBP0ZX EXCISION OF RECTUM, OPEN APPROACH, DIAGNOSTIC: ICD-10-PCS | Performed by: SURGERY

## 2024-11-21 PROCEDURE — 710N000010 HC RECOVERY PHASE 1, LEVEL 2, PER MIN: Performed by: STUDENT IN AN ORGANIZED HEALTH CARE EDUCATION/TRAINING PROGRAM

## 2024-11-21 PROCEDURE — 0DJD8ZZ INSPECTION OF LOWER INTESTINAL TRACT, VIA NATURAL OR ARTIFICIAL OPENING ENDOSCOPIC: ICD-10-PCS | Performed by: SURGERY

## 2024-11-21 PROCEDURE — 84134 ASSAY OF PREALBUMIN: CPT | Performed by: SURGERY

## 2024-11-21 PROCEDURE — 250N000025 HC SEVOFLURANE, PER MIN: Performed by: STUDENT IN AN ORGANIZED HEALTH CARE EDUCATION/TRAINING PROGRAM

## 2024-11-21 PROCEDURE — 250N000009 HC RX 250: Performed by: SURGERY

## 2024-11-21 PROCEDURE — 88331 PATH CONSLTJ SURG 1 BLK 1SPC: CPT | Mod: 26 | Performed by: PATHOLOGY

## 2024-11-21 PROCEDURE — 0DBW0ZX EXCISION OF PERITONEUM, OPEN APPROACH, DIAGNOSTIC: ICD-10-PCS | Performed by: SURGERY

## 2024-11-21 PROCEDURE — 88305 TISSUE EXAM BY PATHOLOGIST: CPT | Mod: 26 | Performed by: STUDENT IN AN ORGANIZED HEALTH CARE EDUCATION/TRAINING PROGRAM

## 2024-11-21 RX ORDER — HEPARIN SODIUM 5000 [USP'U]/.5ML
5000 INJECTION, SOLUTION INTRAVENOUS; SUBCUTANEOUS
Status: COMPLETED | OUTPATIENT
Start: 2024-11-21 | End: 2024-11-21

## 2024-11-21 RX ORDER — SODIUM CHLORIDE, SODIUM LACTATE, POTASSIUM CHLORIDE, CALCIUM CHLORIDE 600; 310; 30; 20 MG/100ML; MG/100ML; MG/100ML; MG/100ML
INJECTION, SOLUTION INTRAVENOUS CONTINUOUS
Status: ACTIVE | OUTPATIENT
Start: 2024-11-21

## 2024-11-21 RX ORDER — LIDOCAINE HYDROCHLORIDE 20 MG/ML
5 SOLUTION OROPHARYNGEAL ONCE
OUTPATIENT
Start: 2024-11-23

## 2024-11-21 RX ORDER — OXYCODONE HYDROCHLORIDE 5 MG/1
5 TABLET ORAL
Status: ACTIVE | OUTPATIENT
Start: 2024-11-21

## 2024-11-21 RX ORDER — ONDANSETRON 2 MG/ML
4 INJECTION INTRAMUSCULAR; INTRAVENOUS ONCE
Status: DISCONTINUED | OUTPATIENT
Start: 2024-11-21 | End: 2024-11-21 | Stop reason: HOSPADM

## 2024-11-21 RX ORDER — DEXAMETHASONE SODIUM PHOSPHATE 4 MG/ML
INJECTION, SOLUTION INTRA-ARTICULAR; INTRALESIONAL; INTRAMUSCULAR; INTRAVENOUS; SOFT TISSUE PRN
Status: DISCONTINUED | OUTPATIENT
Start: 2024-11-21 | End: 2024-11-21

## 2024-11-21 RX ORDER — SODIUM CHLORIDE, SODIUM LACTATE, POTASSIUM CHLORIDE, CALCIUM CHLORIDE 600; 310; 30; 20 MG/100ML; MG/100ML; MG/100ML; MG/100ML
INJECTION, SOLUTION INTRAVENOUS CONTINUOUS PRN
Status: DISCONTINUED | OUTPATIENT
Start: 2024-11-21 | End: 2024-11-21

## 2024-11-21 RX ORDER — PROPOFOL 10 MG/ML
INJECTION, EMULSION INTRAVENOUS PRN
Status: DISCONTINUED | OUTPATIENT
Start: 2024-11-21 | End: 2024-11-21

## 2024-11-21 RX ORDER — FENTANYL CITRATE 50 UG/ML
25 INJECTION, SOLUTION INTRAMUSCULAR; INTRAVENOUS EVERY 5 MIN PRN
Status: ACTIVE | OUTPATIENT
Start: 2024-11-21

## 2024-11-21 RX ORDER — ONDANSETRON 2 MG/ML
INJECTION INTRAMUSCULAR; INTRAVENOUS PRN
Status: DISCONTINUED | OUTPATIENT
Start: 2024-11-21 | End: 2024-11-21

## 2024-11-21 RX ORDER — EPHEDRINE SULFATE 50 MG/ML
INJECTION, SOLUTION INTRAMUSCULAR; INTRAVENOUS; SUBCUTANEOUS PRN
Status: DISCONTINUED | OUTPATIENT
Start: 2024-11-21 | End: 2024-11-21

## 2024-11-21 RX ORDER — MAGNESIUM SULFATE HEPTAHYDRATE 40 MG/ML
INJECTION, SOLUTION INTRAVENOUS PRN
Status: DISCONTINUED | OUTPATIENT
Start: 2024-11-21 | End: 2024-11-21

## 2024-11-21 RX ORDER — ONDANSETRON 4 MG/1
4 TABLET, ORALLY DISINTEGRATING ORAL EVERY 30 MIN PRN
Status: ACTIVE | OUTPATIENT
Start: 2024-11-21

## 2024-11-21 RX ORDER — BUPIVACAINE HYDROCHLORIDE 2.5 MG/ML
INJECTION, SOLUTION EPIDURAL; INFILTRATION; INTRACAUDAL
Status: COMPLETED | OUTPATIENT
Start: 2024-11-21 | End: 2024-11-21

## 2024-11-21 RX ORDER — NALOXONE HYDROCHLORIDE 0.4 MG/ML
0.2 INJECTION, SOLUTION INTRAMUSCULAR; INTRAVENOUS; SUBCUTANEOUS
Status: DISCONTINUED | OUTPATIENT
Start: 2024-11-21 | End: 2024-11-21 | Stop reason: HOSPADM

## 2024-11-21 RX ORDER — AMOXICILLIN 250 MG
1 CAPSULE ORAL 2 TIMES DAILY
Status: ACTIVE | OUTPATIENT
Start: 2024-11-21

## 2024-11-21 RX ORDER — HEPARIN SODIUM 5000 [USP'U]/.5ML
5000 INJECTION, SOLUTION INTRAVENOUS; SUBCUTANEOUS EVERY 8 HOURS
OUTPATIENT
Start: 2024-11-22

## 2024-11-21 RX ORDER — FENTANYL CITRATE 50 UG/ML
INJECTION, SOLUTION INTRAMUSCULAR; INTRAVENOUS PRN
Status: DISCONTINUED | OUTPATIENT
Start: 2024-11-21 | End: 2024-11-21

## 2024-11-21 RX ORDER — LIDOCAINE 40 MG/G
CREAM TOPICAL
Status: ACTIVE | OUTPATIENT
Start: 2024-11-21

## 2024-11-21 RX ORDER — ONDANSETRON 2 MG/ML
4 INJECTION INTRAMUSCULAR; INTRAVENOUS EVERY 30 MIN PRN
Status: ACTIVE | OUTPATIENT
Start: 2024-11-21

## 2024-11-21 RX ORDER — KETAMINE HYDROCHLORIDE 10 MG/ML
INJECTION INTRAMUSCULAR; INTRAVENOUS PRN
Status: DISCONTINUED | OUTPATIENT
Start: 2024-11-21 | End: 2024-11-21

## 2024-11-21 RX ORDER — ONDANSETRON 4 MG/1
4 TABLET, ORALLY DISINTEGRATING ORAL EVERY 6 HOURS PRN
OUTPATIENT
Start: 2024-11-21

## 2024-11-21 RX ORDER — NALOXONE HYDROCHLORIDE 0.4 MG/ML
0.1 INJECTION, SOLUTION INTRAMUSCULAR; INTRAVENOUS; SUBCUTANEOUS
Status: ACTIVE | OUTPATIENT
Start: 2024-11-21

## 2024-11-21 RX ORDER — LIDOCAINE HYDROCHLORIDE 20 MG/ML
INJECTION, SOLUTION INFILTRATION; PERINEURAL PRN
Status: DISCONTINUED | OUTPATIENT
Start: 2024-11-21 | End: 2024-11-21

## 2024-11-21 RX ORDER — ATORVASTATIN CALCIUM 20 MG/1
20 TABLET, FILM COATED ORAL DAILY
Status: DISCONTINUED | OUTPATIENT
Start: 2024-11-21 | End: 2024-11-21

## 2024-11-21 RX ORDER — ONDANSETRON 2 MG/ML
4 INJECTION INTRAMUSCULAR; INTRAVENOUS EVERY 6 HOURS PRN
OUTPATIENT
Start: 2024-11-21

## 2024-11-21 RX ORDER — AMOXICILLIN 250 MG
2 CAPSULE ORAL 2 TIMES DAILY
Status: ACTIVE | OUTPATIENT
Start: 2024-11-21

## 2024-11-21 RX ORDER — HYDROMORPHONE HCL IN WATER/PF 6 MG/30 ML
0.2 PATIENT CONTROLLED ANALGESIA SYRINGE INTRAVENOUS EVERY 5 MIN PRN
Status: ACTIVE | OUTPATIENT
Start: 2024-11-21

## 2024-11-21 RX ORDER — INDOCYANINE GREEN AND WATER 25 MG
KIT INJECTION PRN
Status: DISCONTINUED | OUTPATIENT
Start: 2024-11-21 | End: 2024-11-21 | Stop reason: HOSPADM

## 2024-11-21 RX ORDER — NALOXONE HYDROCHLORIDE 0.4 MG/ML
0.4 INJECTION, SOLUTION INTRAMUSCULAR; INTRAVENOUS; SUBCUTANEOUS
Status: DISCONTINUED | OUTPATIENT
Start: 2024-11-21 | End: 2024-11-21 | Stop reason: HOSPADM

## 2024-11-21 RX ORDER — ACETAMINOPHEN 500 MG
1000 TABLET ORAL EVERY 6 HOURS
Status: ACTIVE | OUTPATIENT
Start: 2024-11-21 | End: 2024-11-24

## 2024-11-21 RX ORDER — CLINDAMYCIN PHOSPHATE 900 MG/50ML
900 INJECTION, SOLUTION INTRAVENOUS
Status: COMPLETED | OUTPATIENT
Start: 2024-11-21 | End: 2024-11-21

## 2024-11-21 RX ORDER — FENTANYL CITRATE 50 UG/ML
50 INJECTION, SOLUTION INTRAMUSCULAR; INTRAVENOUS EVERY 5 MIN PRN
Status: ACTIVE | OUTPATIENT
Start: 2024-11-21

## 2024-11-21 RX ORDER — METOCLOPRAMIDE HYDROCHLORIDE 5 MG/ML
10 INJECTION INTRAMUSCULAR; INTRAVENOUS
OUTPATIENT
Start: 2024-11-23 | End: 2024-11-24

## 2024-11-21 RX ORDER — ALBUTEROL SULFATE 90 UG/1
INHALANT RESPIRATORY (INHALATION) PRN
Status: DISCONTINUED | OUTPATIENT
Start: 2024-11-21 | End: 2024-11-21

## 2024-11-21 RX ORDER — SODIUM CHLORIDE, SODIUM GLUCONATE, SODIUM ACETATE, POTASSIUM CHLORIDE AND MAGNESIUM CHLORIDE 526; 502; 368; 37; 30 MG/100ML; MG/100ML; MG/100ML; MG/100ML; MG/100ML
INJECTION, SOLUTION INTRAVENOUS CONTINUOUS PRN
Status: DISCONTINUED | OUTPATIENT
Start: 2024-11-21 | End: 2024-11-21

## 2024-11-21 RX ORDER — FENTANYL CITRATE 50 UG/ML
25-50 INJECTION, SOLUTION INTRAMUSCULAR; INTRAVENOUS
Status: DISCONTINUED | OUTPATIENT
Start: 2024-11-21 | End: 2024-11-21 | Stop reason: HOSPADM

## 2024-11-21 RX ORDER — CLINDAMYCIN PHOSPHATE 900 MG/50ML
900 INJECTION, SOLUTION INTRAVENOUS SEE ADMIN INSTRUCTIONS
Status: DISCONTINUED | OUTPATIENT
Start: 2024-11-21 | End: 2024-11-21 | Stop reason: HOSPADM

## 2024-11-21 RX ORDER — DEXAMETHASONE SODIUM PHOSPHATE 4 MG/ML
4 INJECTION, SOLUTION INTRA-ARTICULAR; INTRALESIONAL; INTRAMUSCULAR; INTRAVENOUS; SOFT TISSUE
Status: ACTIVE | OUTPATIENT
Start: 2024-11-21

## 2024-11-21 RX ORDER — OXYCODONE HYDROCHLORIDE 10 MG/1
10 TABLET ORAL
Status: ACTIVE | OUTPATIENT
Start: 2024-11-21

## 2024-11-21 RX ORDER — ATORVASTATIN CALCIUM 10 MG/1
10 TABLET, FILM COATED ORAL DAILY
Status: DISCONTINUED | OUTPATIENT
Start: 2024-11-21 | End: 2024-11-21

## 2024-11-21 RX ORDER — HYDROMORPHONE HCL IN WATER/PF 6 MG/30 ML
0.4 PATIENT CONTROLLED ANALGESIA SYRINGE INTRAVENOUS EVERY 5 MIN PRN
Status: ACTIVE | OUTPATIENT
Start: 2024-11-21

## 2024-11-21 RX ORDER — FLUMAZENIL 0.1 MG/ML
0.2 INJECTION, SOLUTION INTRAVENOUS
Status: DISCONTINUED | OUTPATIENT
Start: 2024-11-21 | End: 2024-11-21 | Stop reason: HOSPADM

## 2024-11-21 RX ORDER — ACETAMINOPHEN 325 MG/1
975 TABLET ORAL ONCE
Status: COMPLETED | OUTPATIENT
Start: 2024-11-21 | End: 2024-11-21

## 2024-11-21 RX ADMIN — PHENYLEPHRINE HYDROCHLORIDE 100 MCG: 10 INJECTION INTRAVENOUS at 15:12

## 2024-11-21 RX ADMIN — HYDROMORPHONE HYDROCHLORIDE 0.5 MG: 1 INJECTION, SOLUTION INTRAMUSCULAR; INTRAVENOUS; SUBCUTANEOUS at 16:49

## 2024-11-21 RX ADMIN — Medication 20 MG: at 12:30

## 2024-11-21 RX ADMIN — PHENYLEPHRINE HYDROCHLORIDE 100 MCG: 10 INJECTION INTRAVENOUS at 09:30

## 2024-11-21 RX ADMIN — HEPARIN SODIUM 5000 UNITS: 5000 INJECTION, SOLUTION INTRAVENOUS; SUBCUTANEOUS at 06:19

## 2024-11-21 RX ADMIN — GENTAMICIN SULFATE 400 MG: 40 INJECTION, SOLUTION INTRAMUSCULAR; INTRAVENOUS at 07:28

## 2024-11-21 RX ADMIN — MIDAZOLAM 1 MG: 1 INJECTION INTRAMUSCULAR; INTRAVENOUS at 08:50

## 2024-11-21 RX ADMIN — PROPOFOL 200 MG: 10 INJECTION, EMULSION INTRAVENOUS at 09:00

## 2024-11-21 RX ADMIN — SODIUM CHLORIDE, SODIUM GLUCONATE, SODIUM ACETATE, POTASSIUM CHLORIDE AND MAGNESIUM CHLORIDE: 526; 502; 368; 37; 30 INJECTION, SOLUTION INTRAVENOUS at 08:57

## 2024-11-21 RX ADMIN — ONDANSETRON 4 MG: 2 INJECTION INTRAMUSCULAR; INTRAVENOUS at 21:05

## 2024-11-21 RX ADMIN — Medication 10 MG: at 14:14

## 2024-11-21 RX ADMIN — LIDOCAINE HYDROCHLORIDE 100 MG: 20 INJECTION, SOLUTION INFILTRATION; PERINEURAL at 09:00

## 2024-11-21 RX ADMIN — Medication: at 18:40

## 2024-11-21 RX ADMIN — PHENYLEPHRINE HYDROCHLORIDE 200 MCG: 10 INJECTION INTRAVENOUS at 13:19

## 2024-11-21 RX ADMIN — SUGAMMADEX 200 MG: 100 INJECTION, SOLUTION INTRAVENOUS at 16:36

## 2024-11-21 RX ADMIN — EPHEDRINE SULFATE 5 MG: 5 INJECTION INTRAVENOUS at 12:00

## 2024-11-21 RX ADMIN — PHENYLEPHRINE HYDROCHLORIDE 100 MCG: 10 INJECTION INTRAVENOUS at 09:11

## 2024-11-21 RX ADMIN — BUPIVACAINE HYDROCHLORIDE 20 ML: 2.5 INJECTION, SOLUTION EPIDURAL; INFILTRATION; INTRACAUDAL; PERINEURAL at 06:55

## 2024-11-21 RX ADMIN — Medication 5 MG: at 10:40

## 2024-11-21 RX ADMIN — Medication 20 MG: at 15:45

## 2024-11-21 RX ADMIN — FENTANYL CITRATE 50 MCG: 50 INJECTION, SOLUTION INTRAMUSCULAR; INTRAVENOUS at 06:55

## 2024-11-21 RX ADMIN — CLINDAMYCIN IN 5 PERCENT DEXTROSE 900 MG: 18 INJECTION, SOLUTION INTRAVENOUS at 06:23

## 2024-11-21 RX ADMIN — ONDANSETRON 4 MG: 2 INJECTION INTRAMUSCULAR; INTRAVENOUS at 15:58

## 2024-11-21 RX ADMIN — SODIUM CHLORIDE, POTASSIUM CHLORIDE, SODIUM LACTATE AND CALCIUM CHLORIDE: 600; 310; 30; 20 INJECTION, SOLUTION INTRAVENOUS at 08:54

## 2024-11-21 RX ADMIN — ALBUTEROL SULFATE 6 PUFF: 108 INHALANT RESPIRATORY (INHALATION) at 12:45

## 2024-11-21 RX ADMIN — HYDROMORPHONE HYDROCHLORIDE 0.5 MG: 1 INJECTION, SOLUTION INTRAMUSCULAR; INTRAVENOUS; SUBCUTANEOUS at 11:50

## 2024-11-21 RX ADMIN — EPHEDRINE SULFATE 10 MG: 5 INJECTION INTRAVENOUS at 09:34

## 2024-11-21 RX ADMIN — FENTANYL CITRATE 50 MCG: 50 INJECTION INTRAMUSCULAR; INTRAVENOUS at 15:05

## 2024-11-21 RX ADMIN — Medication 30 MG: at 13:16

## 2024-11-21 RX ADMIN — FENTANYL CITRATE 100 MCG: 50 INJECTION INTRAMUSCULAR; INTRAVENOUS at 08:57

## 2024-11-21 RX ADMIN — DEXMEDETOMIDINE HYDROCHLORIDE 8 MCG: 100 INJECTION, SOLUTION INTRAVENOUS at 10:33

## 2024-11-21 RX ADMIN — FENTANYL CITRATE 50 MCG: 50 INJECTION INTRAMUSCULAR; INTRAVENOUS at 13:53

## 2024-11-21 RX ADMIN — ACETAMINOPHEN 975 MG: 325 TABLET, FILM COATED ORAL at 06:20

## 2024-11-21 RX ADMIN — Medication 20 MG: at 11:38

## 2024-11-21 RX ADMIN — PHENYLEPHRINE HYDROCHLORIDE 0.3 MCG/KG/MIN: 10 INJECTION INTRAVENOUS at 15:29

## 2024-11-21 RX ADMIN — SODIUM CHLORIDE, SODIUM GLUCONATE, SODIUM ACETATE, POTASSIUM CHLORIDE AND MAGNESIUM CHLORIDE: 526; 502; 368; 37; 30 INJECTION, SOLUTION INTRAVENOUS at 13:19

## 2024-11-21 RX ADMIN — Medication 20 MG: at 10:48

## 2024-11-21 RX ADMIN — Medication 10 MG: at 11:38

## 2024-11-21 RX ADMIN — MIDAZOLAM 1 MG: 1 INJECTION INTRAMUSCULAR; INTRAVENOUS at 06:49

## 2024-11-21 RX ADMIN — SODIUM CHLORIDE, SODIUM GLUCONATE, SODIUM ACETATE, POTASSIUM CHLORIDE AND MAGNESIUM CHLORIDE: 526; 502; 368; 37; 30 INJECTION, SOLUTION INTRAVENOUS at 11:41

## 2024-11-21 RX ADMIN — DEXMEDETOMIDINE HYDROCHLORIDE 8 MCG: 100 INJECTION, SOLUTION INTRAVENOUS at 11:40

## 2024-11-21 RX ADMIN — Medication 20 MG: at 14:12

## 2024-11-21 RX ADMIN — MAGNESIUM SULFATE HEPTAHYDRATE 2 G: 2 INJECTION, SOLUTION INTRAVENOUS at 10:31

## 2024-11-21 RX ADMIN — Medication 20 MG: at 09:35

## 2024-11-21 RX ADMIN — DEXMEDETOMIDINE HYDROCHLORIDE 8 MCG: 100 INJECTION, SOLUTION INTRAVENOUS at 10:41

## 2024-11-21 RX ADMIN — DEXAMETHASONE SODIUM PHOSPHATE 8 MG: 4 INJECTION, SOLUTION INTRA-ARTICULAR; INTRALESIONAL; INTRAMUSCULAR; INTRAVENOUS; SOFT TISSUE at 09:24

## 2024-11-21 RX ADMIN — DEXMEDETOMIDINE HYDROCHLORIDE 8 MCG: 100 INJECTION, SOLUTION INTRAVENOUS at 12:30

## 2024-11-21 RX ADMIN — PHENYLEPHRINE HYDROCHLORIDE 100 MCG: 10 INJECTION INTRAVENOUS at 14:41

## 2024-11-21 RX ADMIN — PHENYLEPHRINE HYDROCHLORIDE 100 MCG: 10 INJECTION INTRAVENOUS at 09:26

## 2024-11-21 RX ADMIN — HYDROMORPHONE HYDROCHLORIDE 0.5 MG: 1 INJECTION, SOLUTION INTRAMUSCULAR; INTRAVENOUS; SUBCUTANEOUS at 14:51

## 2024-11-21 RX ADMIN — Medication 5 MG: at 13:08

## 2024-11-21 RX ADMIN — Medication 20 MG: at 14:53

## 2024-11-21 RX ADMIN — ACETAMINOPHEN 1000 MG: 500 TABLET ORAL at 22:49

## 2024-11-21 RX ADMIN — PHENYLEPHRINE HYDROCHLORIDE 100 MCG: 10 INJECTION INTRAVENOUS at 15:22

## 2024-11-21 RX ADMIN — Medication 50 MG: at 09:02

## 2024-11-21 RX ADMIN — FENTANYL CITRATE 50 MCG: 50 INJECTION INTRAMUSCULAR; INTRAVENOUS at 16:35

## 2024-11-21 RX ADMIN — BUPIVACAINE 20 ML: 13.3 INJECTION, SUSPENSION, LIPOSOMAL INFILTRATION at 06:55

## 2024-11-21 RX ADMIN — SODIUM CHLORIDE, POTASSIUM CHLORIDE, SODIUM LACTATE AND CALCIUM CHLORIDE: 600; 310; 30; 20 INJECTION, SOLUTION INTRAVENOUS at 17:45

## 2024-11-21 RX ADMIN — Medication 30 MG: at 09:43

## 2024-11-21 RX ADMIN — FENTANYL CITRATE 50 MCG: 50 INJECTION, SOLUTION INTRAMUSCULAR; INTRAVENOUS at 06:49

## 2024-11-21 RX ADMIN — EPHEDRINE SULFATE 10 MG: 5 INJECTION INTRAVENOUS at 11:53

## 2024-11-21 RX ADMIN — SENNOSIDES AND DOCUSATE SODIUM 2 TABLET: 8.6; 5 TABLET ORAL at 22:49

## 2024-11-21 RX ADMIN — PHENYLEPHRINE HYDROCHLORIDE 100 MCG: 10 INJECTION INTRAVENOUS at 14:01

## 2024-11-21 RX ADMIN — PHENYLEPHRINE HYDROCHLORIDE 100 MCG: 10 INJECTION INTRAVENOUS at 12:00

## 2024-11-21 ASSESSMENT — ACTIVITIES OF DAILY LIVING (ADL)
ADLS_ACUITY_SCORE: 0

## 2024-11-21 ASSESSMENT — LIFESTYLE VARIABLES: TOBACCO_USE: 1

## 2024-11-21 ASSESSMENT — COPD QUESTIONNAIRES: COPD: 0

## 2024-11-21 ASSESSMENT — ENCOUNTER SYMPTOMS: SEIZURES: 0

## 2024-11-21 NOTE — OP NOTE
OPERATIVE REPORT    PREOPERATIVE DIAGNOSIS: Placement of bilateral ureteral stent for perioperative ureteral identification      POSTOPERATIVE DIAGNOSIS:  Same    PROCEDURES PERFORMED:   1. Cystourethroscopy  2. Placement of bilateral ureteral stent.  3. Injection of 10mL of ICG     STAFF SURGEON: Dr. Xavier Art    RESIDENT: Adiel Perez MD    ANESTHESIA: General with Block    ESTIMATED BLOOD LOSS: 1 mL.     DRAINS:  Bilateral 5Fr open ended urethral catheters (right cut at a slant distally), 16Fr urethral mathew catheter       OPERATIVE INDICATIONS:   Bella Quintero is a 49 year old male scheduled for an LAR today with colorectal surgery. Colorectal surgery has asked urology for assistance with placement of bilateral ureteral stents intraoperatively for identification during the case. CT imaging demonstrated no duplicated system bilaterally.     DESCRIPTION OF PROCEDURE:    After informed consent was obtained, the patient was taken to the operating room, and moved to the operating table.  After adequate anesthesia was induced, the patient was repositioned in dorsal lithotomy position and prepped and draped in the usual sterile fashion. A timeout was taken to confirm correct patient, procedure and laterality.     A 22-[place] cystoscope was inserted into a well lubricated urethra. The prostate was long ~4cm with a high bladder neck that made it difficult to navigate into the bladder. Given the difficulty navigating into the bladder, a full cystoscopy was not able to be performed, but the posterior, and lateral walls appeared normal without any pathology. The bladder was drained and then refilled. The right ureteral orifice was near the bladder neck and a sensor wire was advanced until resistance was met. Next the 5Fr open ended ureteral catheter was advanced into the ureter to 20cm. We then turned our attention to the left ureteral orifice which due to the high bladder neck was initially difficult to identify. I  then identified it more lateral than expected compared to the right ureteral orifice. The sensor wire was then advanced into the renal pelvis with resistance met at an expected length. A second 5Fr was advanced over the wire and into the ureter to 20cm at the ureteral orifice. The scope was then push-pulled over the catheters and the scope was removed. The right catheter was then cut at an angle. A 16Fr mathew catheter was inserted without difficulty into the well lubricated ureteral and 10mL was instilled into the balloon. The catheters were then secured to the mathew with silk ties and fed into the drainage port using a 14G angiocath. Colorectal surgery requested ICG instillation afterwards so 5mL of ICG was instilled up both catheters with a 5mL saline push afterwards. The catheter was then connected to drainage. The case was handed over to the colorectal surgery team.          PLAN:   - Stents per colorectal surgery  - given manipulation from the scope and high bladder neck he may have hematuria, continue good hydration to keep the urine light. Should his mathew stop draining post operatively then he may need some PRN irrigations, but I don't anticipate this based on the mild amount of hematuria.     Adiel Perez MD PGY4  Urology Resident

## 2024-11-21 NOTE — PHARMACY-ADMISSION MEDICATION HISTORY
Pharmacist Admission Medication History    Admission medication history is complete. The information provided in this note is only as accurate as the sources available at the time of the update.    Information Source(s):  Pre-op RN assessment, Dispense history (Surescripts)      Pertinent Information: Pt is prescribed atorvastatin, but he is not taking it.      Medication History Completed By: Suni Mayen [facility] 11/21/2024 5:09 PM    PTA Med List   Medication Sig Last Dose/Taking    magnesium citrate 1.745 GM/30ML solution Drink 1 bottle at 8pm the night before surgery 11/20/2024 at  8:00 PM    metroNIDAZOLE (FLAGYL) 500 MG tablet Take 1 tablet (500 mg) by mouth every 6 hours. At 8:00 am, 2:00 pm, 8:00 pm the day prior to your surgery with neomycin and zofran. 11/20/2024 at  8:00 PM    neomycin (MYCIFRADIN) 500 MG tablet Take 2 tablets (1,000 mg) by mouth every 6 hours. At 8:00 am, 2:00 pm, 8:00 pm the day prior to your surgery with flagyl and zofran. 11/20/2024 at  8:00 PM    ondansetron (ZOFRAN) 4 MG tablet Take 1 tablet (4 mg) by mouth every 6 hours. At 8:00 am, 2:00 pm, 8:00 pm the day prior to your surgery with neomycin and flagyl. 11/20/2024 at  8:00 PM    polyethylene glycol (MIRALAX) 17 GM/Dose powder Please take 238 grams mixed with 64 oz of Gatorade at 4pm the night before surgery Past Week    polyethylene glycol (MIRALAX) 17 GM/Dose powder Take 17 g (1 Capful) by mouth daily. 11/20/2024    senna (SENOKOT) 8.6 MG tablet Take 1 tablet by mouth daily as needed for constipation. Past Month

## 2024-11-21 NOTE — OP NOTE
[facility] Colorectal Surgery Operative Report  November 21, 2024    PREOPERATIVE DIAGNOSIS:  1. Diverticulitis  2. Colon cancer  3. Obesity  4. Umbilical hernia    POSTOPERATIVE DIAGNOSIS:   Same    PROCEDURE:  1. Exploratory laparoscopy  2. Laparotomy  3. Open proctectomy  4. Diverting loop ileostomy  5. Repair of umbilical hernia  6. Flexible sigmoidoscopy  7. Cystoscopy and bilateral ureteral stents (urology team)    Please also add a Modifier 22: This case was extremely difficult due to the large size of the tumor  as well as its invasion of the right pelvic sidewall making retraction, exposure, and identification of appropriate tissue plains difficult. This case was at least 90% more difficult and took 2 hours longer than typical for a similar case.     ANESTHESIA: General endotracheal anesthesia plus local anesthesia.    SURGEON:  Frandy Kauffman M.D.    ASSISTANT(S): Gordy Sanchez MD CRS Fellow; Wilbert Joyce MD, R3    INDICATIONS FOR PROCEDURE  This is a 49 year old M with a large rectosigmoid mass. There was concerns for possible impending obstruction as well as microperforation. I thoroughly discussed the risks, benefits, and alternatives of operative treatment with the patient and he agreed to proceed.    General risks related to abdominal surgery were reviewed with the patient. These include, but are not limited to, death, myocardial infarction, pneumonia, urinary tract infection, deep venous thrombosis with or without pulmonary embolus, abdominal infection from bowel injury or abscess, fistula, anastomotic leak that may require reoperation and a stoma, ureteral injury, bowel obstruction, wound infection, and bleeding.    OPERATIVE PROCEDURE:  After obtaining informed consent, the patient was brought to the operating room and placed in the supine position. Appropriate preoperative mechanical and chemical deep venous thrombosis prophylaxis, as well as preoperative prophylactic parenteral antibiotics were given.  "General endotracheal anesthesia was gently induced. Bilateral lower extremity pneumatic compression devices were applied and all pressure points were cushioned.  The rectum was irrigated with saline and Betadine.    The abdomen and perineum were then preped and draped in the standard sterile fashion.  The urology team performed a cystoscopy with bilateral ureteral stents.  Please see their note for further details.    After a \"time-out\" was performed, a 3 cm right lower quadrant incision was made. The anterior fascia was opened with electrocautery. The muscle was bluntly dissected, and the posterior fascia was then sharply opened as well. A small gelport was inserted, and the head of the 29 EEA with cap was gently placed into the pelvis. The cap of the port was then placed, a 12 mm robotic port and assist port placed through the gelport and the abdomen was insufflated without difficulty up to 12mmHg. A 10mm 30 degree robotic laparoscope was then used to explore the peritoneal cavity. No evidence of bleeding, bowel injury or metastatic disease was visualized. Additional trocars were placed at the right abdomen (8-mm x3, including 8 mm Airseal) and subxiphoid (8 mm x1). The patient was positioned in slight [place] with left side up and the small bowel and omentum were then displaced towards the upper abdomen, while the sigmoid colon was tented caudally and laterally. The Reva Systemsi Xi robot was docked and all robotic instruments were placed under direct vision. I then went to the robotic console to perform the operation.     The antimesenteric border of the sigmoid colon was densely adhesed to the left pelvic sidewall.  The moreover, the sigmoid colon was completely plastered to the right abdominal wall and the posterior wall of the bladder.  Over 30 minutes were spent trying to identify the correct planes.  During this time, the insufflation system appears to be malfunctioning.  Several attempts were made to fix " it, including exchanged the tubing system.  Nothing appeared to work properly. Given this issue and the significantly challenges with mobilizing the upper rectum out of the pelvic inlet, the decision made was to convert to an open procedure.  All trocars were removed.    A midline laparotomy was carried out with electrocautery. Subcutaneous tissue were divided and the peritoneal cavity was entered sharply.  An umbilical hernia was noted and incorporated within the laparotomy incision. The hernia sac was excised with electrocautery.  An Reynaldo wound protector was inserted and the mechanical retractor was set up.    I began with the lateral to medial dissection.  The left ureter was identified and protected.  A mesenteric window was created at the proximal site of transection and the colon divided with a single fire of the JUAN stapler with a 100 blue load.  The mesorectum was accessed on the right side.  The incision was carried towards the YUNIER vessel.  The medial and lateral planes were joined bluntly.  The inferior mesenteric artery and veins were skeletonized and clamped with a Carmalt clamp.  These were then divided with sequential fires of the LigaSure device.  The proximal stump was secured with a stick tie of 2-0 PDS suture.  The mesentery was then divided with sequential fire of the LigaSure device.    Attention was turned to the pelvis. The tumor was very large and due to the male, obese body habitus, it occupied the entirety of the pelvic inlet making the dissection extremely challenging.  We began with the posterior dissection of the mesorectum along the TME plane.  The tumor was again bulky and very difficult to manipulate.  The lateral dissections along the peritoneal reflections were very challenging once again due to the large caliber of the bowel and tumor.  Careful dissection was carried around and distal margin was confirmed with an IntraOp flexible sigmoidoscopy.  At this point, the mesorectum was  divided with sequential fires of the LigaSure device.  This was extremely thickened and fibrotic, causing the LigaSure to misfire several times.  Next, a contour was utilized to divide the rectum.  The specimen was passed off for pathology examination, and I walked it to pathology in order to confirm adequate distal margin and discussed the value of frozen sections.  Once the specimen was open, we noted at least 1 cm of distal margin.  Once the specimen was cut perpendicular, we observed that the tumor actually extended through the mesorectum to the peritoneal surface on the right side.  The decision made was to take a shave margin of that area.  I went back to the operating room and I took the additional margin of the right posterior side with electrocautery.  This was sent again for pathology examination and frozen sections.  While ultimately they were confirmed to be negative, an initial concern was raised by the pathologist, therefore I excised another shave margin of the right posterior aspect and send it off for permanent pathology examination.  The right ureter was palpated and protected throughout the procedure.    The rectal stump was then further dissected circumferentially and divided with a single fire of the contour stapler in order to achieve further additional distal margin.  This stump was tested and the air leak test was positive.  This was repaired with 3-0 PDS sutures and 2 fires of the TA stapler blue.  An air leak test was performed again, which was negative.  The pelvis was irrigated with 2 L of fluids.    Next, the proximal colonic stump was identified and the staple line was excised with electrocautery.  A pursestring suture of 2-0 PDS was fashioned and the anvil of a 29 EEA stapler was inserted before the pursestring was cinched down.     EEA sizers were passed through the anus. The body of the EEA stapler was then inserted and the post delivered just anteriorly to the staple line.  The  anvil and post were  and the EEA was closed and fired.  2 intact anastomotic rings were noted and sent off for pathology examination.  A repeat flexible sigmoidoscopy was performed, confirming no evidence of bleeding or air leak.    Due to the severe inflammatory response of his pelvis, decision made was to perform a diverting loop ileostomy. The small bowel was ran from the ileocecal valve and a site 30 cm from the ileocecal valve was identified for the proposed loop ileostomy site. A diverting loop ileostomy was then fashioned at the extraction site after both fascia and skin were partially reapproximated with interrupted sutures of and 4-0 Monocryl respectively.     A #19 Hungarian Stephan drain was left in the pelvis and secured with a 3-0 nylon suture.  0 PDS.  The fascia was closed with running 0 PDS.  The wound was irrigated and the skin was approximated with skin staples over a yellow vessel loop.  Sterile dressings were applied.  The robotic port were closed with 4-0 Monocryl and Dermabond was applied.     A curvilinear incision was made on the small bowel with return of succus confirming entrance. The ileostomy was then matured in standard Deidra fashion.  An ostomy bag was cut to size and applied.    The patient tolerated the procedure well and was brought to PACU in stable conditions.    COMPLICATIONS: none.    ESTIMATED BLOOD LOSS: 200 mL.    SPECIMEN(S): 1. Sigmoid colon and rectum. 2. Right Pelvic sidewall    OPERATIVE COUNT: Complete.    OPERATIVE FINDINGS:   1. Stapled end-to-side colorectal anastomosis. Anastomotic pneumatic test: Negative.     Colon Resection  Operation performed with curative intent Yes   Tumor Location Rectosigmoid junction   Extent of colon and vascular resection Other LAR and YUNIER       Frandy Kauffman MD    Division of Colon & Rectal Surgery  Department of Surgery  [facility]  [phone]      CC:  [facility] Surgery billing.

## 2024-11-21 NOTE — ANESTHESIA CARE TRANSFER NOTE
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    Anesthesia Type:   General     Note:    Oropharynx: oropharynx clear of all foreign objects and spontaneously breathing  Level of Consciousness: awake  Oxygen Supplementation: face mask  Level of Supplemental Oxygen (L/min / FiO2): 6  Independent Airway: airway patency satisfactory and stable  Dentition: dentition unchanged  Vital Signs Stable: post-procedure vital signs reviewed and stable  Report to RN Given: handoff report given  Patient transferred to: PACU    Handoff Report: Identifed the Patient, Identified the Reponsible Provider, Reviewed the pertinent medical history, Discussed the surgical course, Reviewed Intra-OP anesthesia mangement and issues during anesthesia, Set expectations for post-procedure period and Allowed opportunity for questions and acknowledgement of understanding  Vitals:  Vitals Value Taken Time   BP     Temp     Pulse     Resp     SpO2         Electronically Signed By: RACHEL Wolf CRNA  November 21, 2024  4:57 PM

## 2024-11-21 NOTE — ANESTHESIA PREPROCEDURE EVALUATION
Anesthesia Pre-Procedure Evaluation    Patient: Bella Quintero   MRN: [id number] : 1975        Procedure : Procedure(s):  Cystoscopy, Insert Stent Ureter(s)  RESECTION, RECTUM, LOW ANTERIOR, ROBOT-ASSISTED          Past Medical History:   Diagnosis Date    Bilateral low back pain with right-sided sciatica     Diverticulitis of colon 2024    History of alcohol use disorder     HLD (hyperlipidemia)     Obesity     Tobacco use disorder       Past Surgical History:   Procedure Laterality Date    COLONOSCOPY N/A 2024    Procedure: COLONOSCOPY, WITH POLYPECTOMY AND BIOPSY;  Surgeon: Ranjan Verdin MD;  Location:  GI      Allergies   Allergen Reactions    Codeine Swelling and Itching     PN: LW Reaction: EDEMA, GENERALIZED   Tolerated hydromorphone 24    Penicillins Other (See Comments)     Swelling, burning feeling in hands and feet. , PN: LW Reaction: EDEMA, GENERALIZED      Social History     Tobacco Use    Smoking status: Every Day     Current packs/day: 1.00     Average packs/day: 1 pack/day for 30.4 years (30.4 ttl pk-yrs)     Types: Cigarettes     Start date: 1994    Smokeless tobacco: Never    Tobacco comments:     Revisit as pt is willing   Substance Use Topics    Alcohol use: Not Currently     Alcohol/week: 15.0 standard drinks of alcohol     Types: 15 Glasses of wine per week     Comment: 24. Minimal drinking currently. 3 bottles of wine per week. H/o 1L EtOH every other day, self decreased several years ago.      Wt Readings from Last 1 Encounters:   24 103 kg (227 lb 1.2 oz)        Anesthesia Evaluation   Pt has not had prior anesthetic         ROS/MED HX  ENT/Pulmonary:     (+)     BETI risk factors, snores loudly,  obese,  observed stopped breathing,      tobacco use, Current use, 0.8 packs/day,  patient smoked within 24 hours,                 (-) asthma and COPD (denies)   Neurologic:  - neg neurologic ROS  (-) no seizures, no CVA and no TIA  "  Cardiovascular:     (+) Dyslipidemia - -   -  - -                                 Previous cardiac testing   Echo: Date: Results:    Stress Test:  Date: Results:    ECG Reviewed:  Date: 6/6/23 Results:  Sinus rhythm   Prolonged QT   Abnormal ECG     Cath:  Date: Results:      METS/Exercise Tolerance: 4 - Raking leaves, gardening    Hematologic:  - neg hematologic  ROS     Musculoskeletal: Comment: Back pain       GI/Hepatic: Comment: Diverticulitis    Colonic mass     (-) GERD   Renal/Genitourinary:  - neg Renal ROS     Endo:     (+)               Obesity,       Psychiatric/Substance Use:     (+)   alcohol abuse  Recreational drug usage: Cannabis.    Infectious Disease:  - neg infectious disease ROS     Malignancy:  - neg malignancy ROS     Other:  - neg other ROS          Physical Exam    Airway        Mallampati: III   TM distance: > 3 FB   Neck ROM: full     Respiratory Devices and Support         Dental       (+) Modest Abnormalities - crowns, retainers, 1 or 2 missing teeth      Cardiovascular          Rhythm and rate: regular and normal     Pulmonary           breath sounds clear to auscultation           OUTSIDE LABS:  CBC:   Lab Results   Component Value Date    WBC 9.2 11/07/2024    WBC 11.2 (H) 10/13/2024    HGB 14.4 11/07/2024    HGB 14.8 10/13/2024    HCT 42.0 11/07/2024    HCT 45.9 10/13/2024     11/07/2024     10/13/2024     BMP:   Lab Results   Component Value Date     11/07/2024     10/13/2024    POTASSIUM 4.1 11/07/2024    POTASSIUM 4.4 10/13/2024    CHLORIDE 104 11/07/2024    CHLORIDE 101 10/13/2024    CO2 23 11/07/2024    CO2 26 10/13/2024    BUN 8.4 11/07/2024    BUN 10.8 10/13/2024    CR 0.72 11/07/2024    CR 0.75 10/13/2024     (H) 11/21/2024    GLC 87 11/07/2024     COAGS: No results found for: \"PTT\", \"INR\", \"FIBR\"  POC: No results found for: \"BGM\", \"HCG\", \"HCGS\"  HEPATIC:   Lab Results   Component Value Date    ALBUMIN 3.6 11/07/2024    PROTTOTAL 6.6 " "11/07/2024    ALT 11 11/07/2024    AST 14 11/07/2024    ALKPHOS 102 11/07/2024    BILITOTAL 0.3 11/07/2024     OTHER:   Lab Results   Component Value Date    LACT 1.2 08/23/2024    NORMA 8.6 (L) 11/07/2024    LIPASE 19 10/13/2024    TSH 1.22 07/09/2024       Anesthesia Plan    ASA Status:  3    NPO Status:  NPO Appropriate    Anesthesia Type: General.     - Airway: ETT   Induction: Intravenous, Propofol.   Maintenance: Balanced.        Consents    Anesthesia Plan(s) and associated risks, benefits, and realistic alternatives discussed. Questions answered and patient/representative(s) expressed understanding.     - Discussed: Risks, Benefits and Alternatives for BOTH SEDATION and the PROCEDURE were discussed     - Discussed with:  Patient      - Extended Intubation/Ventilatory Support Discussed: No.      - Patient is DNR/DNI Status: No     Use of blood products discussed: Yes.     - Discussed with: Patient.     Postoperative Care    Pain management: Oral pain medications, IV analgesics.   PONV prophylaxis: Ondansetron (or other 5HT-3), Dexamethasone or Solumedrol     Comments:               Austin Suarez MD    I have reviewed the pertinent notes and labs in the chart from the past 30 days and (re)examined the patient.  Any updates or changes from those notes are reflected in this note.                         # Obesity: Estimated body mass index is 35.56 kg/m  as calculated from the following:    Height as of this encounter: 1.702 m (5' 7\").    Weight as of this encounter: 103 kg (227 lb 1.2 oz).             "

## 2024-11-21 NOTE — BRIEF OP NOTE
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]  Post-operative diagnosis Same as pre-operative diagnosis    Procedure: Cystoscopy, Insert Stent Ureter(s), Bilateral - Urethra  ROBOT-ASSISTED, CONVERTED TO OPEN, PROCTECTOMY, DIVERTING LOOP ILEOSTOMY; repair of umbilical hernia, N/A - Pelvis  Sigmoidoscopy flexible, N/A - Anus  Laparoscopy diagnostic, N/A - Abdomen    Surgeon: Surgeons and Role:  Panel 1:     * Xavier Art MD - Primary     * Adiel Perez MD - Resident - Assisting  Panel 2:     * Frandy Kauffman MD - Primary     * Wilbert Asher MD - Resident - Assisting     * Westley Riley MD - Fellow - Assisting  Anesthesia: General with Block   Estimated Blood Loss: 150 mL from 11/21/2024  8:53 AM to 11/21/2024  4:49 PM      Drains: Chang-Nassar in the pelvis  Specimens:   ID Type Source Tests Collected by Time Destination   1 : peritoeum biopsy Tissue Peritoneum SURGICAL PATHOLOGY EXAM Frandy Kauffman MD 11/21/2024 12:19 PM    2 : Sigmoid colon and upper rectum Tissue Large Intestine, Colon SURGICAL PATHOLOGY EXAM, RESEARCH SPECIMEN FOR BIONET TESTING Frandy Kauffman MD 11/21/2024  1:28 PM    3 : Distal Margin Tissue Large Intestine, Colon SURGICAL PATHOLOGY EXAM Frandy Kauffman MD 11/21/2024  1:57 PM    4 : Additional Distal Margin Tissue Large Intestine, Colon SURGICAL PATHOLOGY EXAM Frandy Kauffman MD 11/21/2024  1:58 PM    5 : Superior additional margin Tissue Large Intestine, Colon SURGICAL PATHOLOGY EXAM Frandy Kauffman MD 11/21/2024  2:39 PM    6 : additional right margin Tissue Large Intestine, Colon SURGICAL PATHOLOGY EXAM Frandy Kauffman MD 11/21/2024  2:40 PM    7 : Anastomotic Rings Tissue Anastomosis SURGICAL PATHOLOGY EXAM (Canceled) Xavier Art MD 11/21/2024  2:54 PM    8 : final right margin Tissue Large Intestine, Colon SURGICAL PATHOLOGY EXAM Frandy Kauffman MD  11/21/2024  3:39 PM      Findings:   Large rectosigmoid mass occupying most of the pelvis with abutment of the right lateral side wall .  Complications: None.  Implants: * No implants in log *

## 2024-11-22 ENCOUNTER — APPOINTMENT (OUTPATIENT)
Dept: PHYSICAL THERAPY | Facility: CLINIC | Age: 49
End: 2024-11-22
Attending: SURGERY
Payer: COMMERCIAL

## 2024-11-22 ENCOUNTER — APPOINTMENT (OUTPATIENT)
Dept: OCCUPATIONAL THERAPY | Facility: CLINIC | Age: 49
End: 2024-11-22
Attending: SURGERY
Payer: COMMERCIAL

## 2024-11-22 VITALS
DIASTOLIC BLOOD PRESSURE: 75 MMHG | HEIGHT: 67 IN | SYSTOLIC BLOOD PRESSURE: 112 MMHG | OXYGEN SATURATION: 96 % | TEMPERATURE: 98.8 F | HEART RATE: 80 BPM | BODY MASS INDEX: 35.64 KG/M2 | RESPIRATION RATE: 16 BRPM | WEIGHT: 227.07 LBS

## 2024-11-22 LAB
ANION GAP SERPL CALCULATED.3IONS-SCNC: 9 MMOL/L (ref 7–15)
BUN SERPL-MCNC: 16.5 MG/DL (ref 6–20)
CALCIUM SERPL-MCNC: 8.2 MG/DL (ref 8.8–10.4)
CHLORIDE SERPL-SCNC: 99 MMOL/L (ref 98–107)
CREAT SERPL-MCNC: 1.1 MG/DL (ref 0.67–1.17)
EGFRCR SERPLBLD CKD-EPI 2021: 82 ML/MIN/1.73M2
ERYTHROCYTE [DISTWIDTH] IN BLOOD BY AUTOMATED COUNT: 13.9 % (ref 10–15)
GLUCOSE SERPL-MCNC: 140 MG/DL (ref 70–99)
HCO3 SERPL-SCNC: 25 MMOL/L (ref 22–29)
HCT VFR BLD AUTO: 36.8 % (ref 40–53)
HCT VFR BLD AUTO: 37.8 % (ref 40–53)
HGB BLD-MCNC: 12 G/DL (ref 13.3–17.7)
HGB BLD-MCNC: 12.5 G/DL (ref 13.3–17.7)
MAGNESIUM SERPL-MCNC: 2.5 MG/DL (ref 1.7–2.3)
MCH RBC QN AUTO: 30.9 PG (ref 26.5–33)
MCHC RBC AUTO-ENTMCNC: 33.1 G/DL (ref 31.5–36.5)
MCV RBC AUTO: 94 FL (ref 78–100)
PHOSPHATE SERPL-MCNC: 4.1 MG/DL (ref 2.5–4.5)
PLATELET # BLD AUTO: 369 10E3/UL (ref 150–450)
POTASSIUM SERPL-SCNC: 5.2 MMOL/L (ref 3.4–5.3)
RBC # BLD AUTO: 4.04 10E6/UL (ref 4.4–5.9)
SODIUM SERPL-SCNC: 133 MMOL/L (ref 135–145)
WBC # BLD AUTO: 16.2 10E3/UL (ref 4–11)

## 2024-11-22 PROCEDURE — 120N000002 HC R&B MED SURG/OB UMMC

## 2024-11-22 PROCEDURE — 250N000013 HC RX MED GY IP 250 OP 250 PS 637: Performed by: INTERNAL MEDICINE

## 2024-11-22 PROCEDURE — 258N000003 HC RX IP 258 OP 636: Performed by: SURGERY

## 2024-11-22 PROCEDURE — 83735 ASSAY OF MAGNESIUM: CPT | Performed by: SURGERY

## 2024-11-22 PROCEDURE — 97110 THERAPEUTIC EXERCISES: CPT | Mod: GP

## 2024-11-22 PROCEDURE — 36415 COLL VENOUS BLD VENIPUNCTURE: CPT

## 2024-11-22 PROCEDURE — 85027 COMPLETE CBC AUTOMATED: CPT

## 2024-11-22 PROCEDURE — 250N000013 HC RX MED GY IP 250 OP 250 PS 637: Performed by: SURGERY

## 2024-11-22 PROCEDURE — G0463 HOSPITAL OUTPT CLINIC VISIT: HCPCS

## 2024-11-22 PROCEDURE — 97530 THERAPEUTIC ACTIVITIES: CPT | Mod: GO

## 2024-11-22 PROCEDURE — 999N000127 HC STATISTIC PERIPHERAL IV START W US GUIDANCE

## 2024-11-22 PROCEDURE — 250N000011 HC RX IP 250 OP 636

## 2024-11-22 PROCEDURE — 85014 HEMATOCRIT: CPT

## 2024-11-22 PROCEDURE — 84100 ASSAY OF PHOSPHORUS: CPT | Performed by: SURGERY

## 2024-11-22 PROCEDURE — 97165 OT EVAL LOW COMPLEX 30 MIN: CPT | Mod: GO

## 2024-11-22 PROCEDURE — 80048 BASIC METABOLIC PNL TOTAL CA: CPT

## 2024-11-22 PROCEDURE — 97161 PT EVAL LOW COMPLEX 20 MIN: CPT | Mod: GP

## 2024-11-22 PROCEDURE — 97530 THERAPEUTIC ACTIVITIES: CPT | Mod: GP

## 2024-11-22 RX ADMIN — ACETAMINOPHEN 1000 MG: 500 TABLET ORAL at 01:51

## 2024-11-22 RX ADMIN — SODIUM CHLORIDE, POTASSIUM CHLORIDE, SODIUM LACTATE AND CALCIUM CHLORIDE: 600; 310; 30; 20 INJECTION, SOLUTION INTRAVENOUS at 05:21

## 2024-11-22 RX ADMIN — OXYCODONE HYDROCHLORIDE 5 MG: 5 TABLET ORAL at 13:24

## 2024-11-22 RX ADMIN — HEPARIN SODIUM 5000 UNITS: 5000 INJECTION, SOLUTION INTRAVENOUS; SUBCUTANEOUS at 18:34

## 2024-11-22 RX ADMIN — ACETAMINOPHEN 1000 MG: 500 TABLET ORAL at 11:03

## 2024-11-22 RX ADMIN — ACETAMINOPHEN 1000 MG: 500 TABLET ORAL at 18:34

## 2024-11-22 NOTE — PROGRESS NOTES
"  Urology Post Op Check    11/21/2024    Bella Quintero is a 49 year old male s/p Panel 1  Cystoscopy, Insert Stent Ureter(s): 1482096  Panel 2  ROBOT-ASSISTED, CONVERTED TO OPEN, PROCTECTOMY, DIVERTING LOOP ILEOSTOMY; repair of umbilical hernia:   Sigmoidoscopy flexible: [id number]  Laparoscopy diagnostic: 062545290/21/2024.    Pt reports pain well controlled with PCA. Just started having some nausea, no vomiting. Denies chest pain, sob, nausea, vomiting. Has mathew in place     /76   Pulse 80   Temp 99  F (37.2  C) (Oral)   Resp 16   Ht 1.702 m (5' 7\")   Wt 103 kg (227 lb 1.2 oz)   SpO2 96%   BMI 35.56 kg/m      Gen: Alert, NAD  Chest: breathing non-labored on 3 L NC    Abdomen: soft, appropriately tender, mildly distended. LLQ MOHINDER with serosanuinous output. Stoma pink, serosanguinous drainage in bag.   Incision: laparoscopic incisions intact with dermabond. Midline incision with dressing in place, no strikethrough.   Extremities: warm and well perfused    A/P: No acute post-op issues. Continue multimodal pain control, PRN meds for nausea. Recommend incentive spirometry with pulmonary toilet. Continue plan of care per primary team. Please call with any questions.    Tere Jay MD  General Surgery PGY-1    Please page with a 10 digit call back number on call resident through [facility].      "

## 2024-11-22 NOTE — PROGRESS NOTES
Colorectal Surgery Progress Note  [facility]  POD#1  Procedure(s):  Cystoscopy, Temporary Insert Stent Bilateral Ureter  ROBOT-ASSISTED, CONVERTED TO OPEN, PROCTECTOMY, DIVERTING LOOP ILEOSTOMY; repair of umbilical hernia  Sigmoidoscopy flexible  OPEN PROCTECTOMY, DIVERTING LOOP ILEOSTOMY; repair of umbilical hernia     Subjective:  Patient is doing well. Pain is under control. Tolerating some water.     Vitals:  Vitals:    11/21/24 2200 11/22/24 0000 11/22/24 0200 11/22/24 0400   BP: 112/76 112/75 104/69 109/61   BP Location:   Right arm Right arm   Pulse:       Resp:    14   Temp: 98.8  F (37.1  C) 98.2  F (36.8  C)  97.9  F (36.6  C)   TempSrc: Oral Oral  Oral   SpO2: 96% 96% 95% 96%   Weight:       Height:         I/O:  I/O last 3 completed shifts:  In: 3657 [I.V.:3657]  Out: 955 [Urine:780; Drains:25; Blood:150]    Physical Exam:  Gen: AAOx3, NAD  Pulm: Non-labored breathing  Abd: Soft, non-distended, appropriately tender, no guarding. Abdominal binder in placed   Incision C/D/I with staples in place, vessel loop   Stoma pink and viable with no stool and no gas in bag   MOHINDER drain Serosanguineous drainage   Ext:  Warm and well-perfused, SCDs in placed    BMP  Recent Labs   Lab 11/21/24  0725 11/21/24  0548     --    POTASSIUM 4.5  --    CHLORIDE 102  --    CO2 25  --    BUN 9.1  --    CR 0.77  --    * 136*   MAG 2.3  --      CBC  Recent Labs   Lab 11/21/24  0725   WBC 14.2*   HGB 14.0   HCT 42.2            ASSESSMENT: This is a 49 year old male  with Malignant neoplasm of rectosigmoid junction and diverticulitis with PMH: HLD, tobacco use, obesity, low back pain, cannabis use and alcohol abuse     POD 1  ROBOT-ASSISTED, CONVERTED TO OPEN, PROCTECTOMY, DIVERTING LOOP ILEOSTOMY; repair of umbilical hernia:     Today:   - hold heparin  - keep mathew  - FLD  - Recheck H and H in PM  - continue LR 75    Neuro/Pain:   #Acute Post op pain  - tylenol, PCA dilaudid  - PRN  oxycodone  CV:  #HLD  - normotensive  - continue to monitor    PULM: encourage IS and ambulation  - wean O2 as tolerated    GI/FEN:   - FLD  - PRN antiemetics: zofran, compazine    :   - Mathew, keep mathew  - monitor I&O  - LR 75ml/hr  Heme:  - Hgb: 12.5 (14.0)  - HOLD SQH  - recheck H&H at 1300    ID:   #Leukocytosis  - WBC 16.2 (14.2)  - likely reactive    Endocrine:   #Obesity, BMI 35  - Glucose:     Lines: mathew  Activity: as tolerated.  Ppx: SQH: HOLD  Dispo: floor care    PHOEBE Fonseca DO  General Surgery PGY-1      Patient was seen and discussed with Dr. Asher    The above plan of care was performed and communicated to me by Frandy Kauffman MD      I spent ____ minute face-to-face or coordinating care of Bella Quintero. Over 50% of our time on the unit was spent counseling the patient and/or coordinating care as documented in the assessment and plan.     [id number] post op hospital visit

## 2024-11-22 NOTE — PROGRESS NOTES
"   11/22/24 9667   Appointment Info   Signing Clinician's Name / Credentials (PT) Aleja Nair, PT, DPT   Living Environment   People in Home spouse   Current Living Arrangements apartment   Home Accessibility stairs to enter home   Number of Stairs, Main Entrance greater than 10 stairs   Stair Railings, Main Entrance railings on both sides of stairs   Transportation Anticipated family or friend will provide   Living Environment Comments Flight of stairs to enter home, patient also reports he may head to his mother's house following hospital discharge, no stairs to enter at mother's house.   Self-Care   Usual Activity Tolerance good   Current Activity Tolerance moderate   Equipment Currently Used at Home none  (Has access to walker and shower chair at home and at mother's house)   Fall history within last six months no   Activity/Exercise/Self-Care Comment Patient reports previous IND with mobility and ADLs. Does some cooking, cleaning, and laundry.   General Information   Onset of Illness/Injury or Date of Surgery 11/21/24   Referring Physician Frandy Kauffman MD   Patient/Family Therapy Goals Statement (PT) To go home   Pertinent History of Current Problem (include personal factors and/or comorbidities that impact the POC) Per chart review \" This is a 49 year old male  with Malignant neoplasm of rectosigmoid junction and diverticulitis with PMH: HLD, tobacco use, obesity, low back pain, cannabis use and alcohol abuse \"   Existing Precautions/Restrictions fall;abdominal   Cognition   Affect/Mental Status (Cognition) WFL   Orientation Status (Cognition) oriented x 3   Follows Commands (Cognition) WFL   Range of Motion (ROM)   Range of Motion ROM is WFL   Strength (Manual Muscle Testing)   Strength (Manual Muscle Testing) Deficits observed during functional mobility   Bed Mobility   Bed Mobility supine-sit   Supine-Sit Ellis (Bed Mobility) minimum assist (75% patient effort);1 person assist   Impairments " Contributing to Impaired Bed Mobility pain   Transfers   Transfers sit-stand transfer   Sit-Stand Transfer   Sit-Stand Prentice (Transfers) contact guard;1 person assist   Assistive Device (Sit-Stand Transfers) walker, front-wheeled   Gait/Stairs (Locomotion)   Prentice Level (Gait) contact guard;1 person assist   Assistive Device (Gait) walker, front-wheeled   Distance in Feet (Gait) 30   Pattern (Gait) step-through   Deviations/Abnormal Patterns (Gait) gait speed decreased;alejandro decreased;stride length decreased   Comment, (Gait/Stairs) Unable to progress to stairs this session due to fatigue, per clinical judgement anticipate patient would benefit from assist of 1 for safe stair navigation.   Balance   Balance Comments Patient benefits from BUE support on FWW for safe mobility at this time.   Clinical Impression   Criteria for Skilled Therapeutic Intervention Yes, treatment indicated   PT Diagnosis (PT) Impaired functional mobility   Influenced by the following impairments Pain   Functional limitations due to impairments Bed mobility, transfers, gait, stairs   Clinical Presentation (PT Evaluation Complexity) stable   Clinical Presentation Rationale Per clinical judgement   Clinical Decision Making (Complexity) low complexity   Planned Therapy Interventions (PT) balance training;bed mobility training;gait training;home exercise program;neuromuscular re-education;patient/family education;stair training;strengthening;transfer training   Risk & Benefits of therapy have been explained evaluation/treatment results reviewed;care plan/treatment goals reviewed;participants voiced agreement with care plan;participants included;patient   PT Total Evaluation Time   PT Theodore, Low Complexity Minutes (36716) 10   Physical Therapy Goals   PT Frequency Daily   PT Predicted Duration/Target Date for Goal Attainment 11/29/24   PT Goals Bed Mobility;Transfers;Gait;Stairs;PT Goal 1   PT: Bed Mobility Supervision/stand-by  assist;Supine to/from sit;Within precautions   PT: Transfers Modified independent;Sit to/from stand;Bed to/from chair;Assistive device   PT: Gait Modified independent;Assistive device;Greater than 200 feet   PT: Stairs Supervision/stand-by assist;Greater than 10 stairs;Rail on both sides   PT: Goal 1 Patient will be IND with HEP in order to improve strength and overall IND with mobility.   Interventions   Interventions Quick Adds Therapeutic Activity;Therapeutic Procedure   Therapeutic Procedure/Exercise   Ther. Procedure: strength, endurance, ROM, flexibillity Minutes (76941) 11   Symptoms Noted During/After Treatment fatigue   Treatment Detail/Skilled Intervention Patient performed supine BLE exercises x 10 with verbal cueing and visual demonstration required for proper technique. Rest break required due to fatigue.   Therapeutic Activity   Therapeutic Activities: dynamic activities to improve functional performance Minutes (15368) 28   Symptoms Noted During/After Treatment Fatigue   Treatment Detail/Skilled Intervention Patient supine in bed on arrival, agreeable to therapy. Time required to obtain equipment and for line management. Educated patient on abdominal precautions, handout provided and reviewed with patient. Hands on physical assistance and verbal cueing provided for bed mobility, good follow through noted. Increased time sitting EOB due to lightheadedness. Scooting towards EOB with SBA x 1, verbal cueing for hand placement. Assist x 1 to doff soiled abdominal binder and don new abdominal binder. Patient ambulated an additional 10' with CGA x 1 and FWW. Stand to sit to bed surface with CGA x 1 and FWW, assistance with lines. Scooting hips backwards onto bed with SBA x 1, verbal cueing for hand placement. Sit to supine with min a x1, assistance at BLE. Supine scooting towards HOB with SBA  1, verbal cueing for set up and technique. Patient supine in bed with call light within reach at end of session.    PT Discharge Planning   PT Plan Bed mobility, transfers, gait, stairs   PT Discharge Recommendation (DC Rec) home;home with assist   PT Rationale for DC Rec Patient currently requires assist of 1 with bed mobility and CGA with transfers and gait. He lives with his spouse who is able to provide assistance as needed. Anticipate patient will be safe to discharge home with assist once medically cleared to do so.   PT Brief overview of current status Assist of 1 for bed mobility, transfers, gait; encourage amb 3-4x/day   Physical Therapy Time and Intention   Timed Code Treatment Minutes 39   Total Session Time (sum of timed and untimed services) 49   Aleja Nair, PT, DPT

## 2024-11-22 NOTE — ANESTHESIA POSTPROCEDURE EVALUATION
Patient: Bella Quintero    Procedure: Procedure(s):  Cystoscopy, Insert Stent Ureter(s)  ROBOT-ASSISTED, CONVERTED TO OPEN, PROCTECTOMY, DIVERTING LOOP ILEOSTOMY; repair of umbilical hernia  Sigmoidoscopy flexible  Laparoscopy diagnostic       Anesthesia Type:  General    Note:  Disposition: Admission   Postop Pain Control: Uneventful            Sign Out: Well controlled pain   PONV: No   Neuro/Psych: Uneventful            Sign Out: Acceptable/Baseline neuro status   Airway/Respiratory: Uneventful            Sign Out: Acceptable/Baseline resp. status   CV/Hemodynamics: Uneventful            Sign Out: Acceptable CV status; No obvious hypovolemia; No obvious fluid overload   Other NRE: NONE   DID A NON-ROUTINE EVENT OCCUR? No           Last vitals:  Vitals Value Taken Time   /75 11/21/24 1815   Temp 37.2  C (98.9  F) 11/21/24 1651   Pulse 77 11/21/24 1819   Resp 11 11/21/24 1819   SpO2 96 % 11/21/24 1819   Vitals shown include unfiled device data.    Electronically Signed By: STEPHEN SINGH MD  November 21, 2024  6:19 PM

## 2024-11-22 NOTE — CONSULTS
[facility]  WO Nurse Inpatient Assessment     Consulted for: new ileostomy    Patient History (according to provider note(s):      49 year old male  with Malignant neoplasm of rectosigmoid junction and diverticulitis with PMH: HLD, tobacco use, obesity, low back pain, cannabis use and alcohol abuse     Assessment:      Areas visualized during today's visit: Focused: and Abdomen    Assessment of new loop Ileostomy:  Diagnosis Pertinent to Stoma: Diverticulitis      Surgery Date: 11/22/24  Surgeon:Frandy Kauffman       [facility]ospital: [facility]  Pouching system in place on assessment today: Conchis two piece, cut to fit, and flat   Pouch barrier status:  80% melted at 9 o'clock  Pouch last changed/wear time: 1 day  Reason for pouch change today: ostomy education and initial post-op assessment  Effectiveness of current pouching/ supply plan: Attempting new system, will re-evaluate next assessment  Change made with ostomy management today: Yes  Pouching system placed today: [place] two piece and cut to fit with barrier ring  Supplies: gathered and discussed with patient      Last photo: 11/22/24  Stoma location: RLQ  Stoma size: 1 1/2 inches,   Stoma appearance: red, moist, good protrusion but empties at skin level at 8 o'clock  Mucocutaneous junction:  intact  Peristomal complication(s): mechanical damage - bruised appearance at 9 o'clock  Output: gas, watery, and brown  Output volume emptied during visit: 10ml  Abdominal assessment: Firm  Surgical site(s): dressing dry and intact  NG still in place? No  Pain: Sharp, Dull ache, and Fullness  Is patient still on a PCA? Yes    Ostomy education assessment:  Participant of teaching session today: patient   Education completed today: Initial fitting, Pouching system assessment , Refitting of appliance , Adjustment of pouching plan, Pouch change demonstration, Ostomy accessory product use , Introduction to pouches, Peristomal skin  "care, and Pouch emptying demonstration  Educational materials/methods: Verbal and Demonstration  Education still needed: Pouch change return demonstration, Peristomal skin care, Pouch emptying return demonstration, Intake and output recording, When to seek medical attention, Low fiber diet , Odor/flatus management , Infection prevention/hygiene , Hernia prevention, Lifestyle adjustments , and Discharge instructions  Learning Comprehension:   Psychosocial assessment: lives with his spouse who is willing to assist with cares per pt.  Also has a friend who may be willing to assist-he lives in the pt's apartment complex  Patient readiness for education today: observing and attentive  Following today's visit: patient  is able to demonstrate;         1. How to empty their pouch? No and Demo provided         2. How to change their pouch? No and Demo provided         3. How to read and record intake and output correctly? No  Preparation for discharge completed: No discharge preparation started yet  Preparation for discharge still needed: Placed prescription recommendations in discharge navigator for MD to sign, Ensured patient has extra supplies for discharge, Discussed how to order supplies after discharge , Ordered samples from  after gaining consent from patient/caregiver, Discussed how and when to make an outpatient WOC nurse appointment after discharge, Prepared for discharge home with home care, and Discuss signs/symptoms of when to seek medical attention  WO recommend home care? Yes and By WOC RN if possible  Face to face time: 35 minutes       Treatment Plan:     RLQ Ileostomy pouching plan:   Pouching system: ostomy supplies pouches: Conchis 57 FECAL (810652) ostomy supplies barrier: [place] 57mm FLAT ([id number])  Accessories used: WOC ostomy accessories: 2\" Cera Barrier Ring ([id number])   Frequency of pouch changes: PRN leakage and Three times a week  WOC follow up plan: Daily Monday-Friday (as " able)  Bedside RN interventions: Change pouch PRN if leaking using the supplies above, Empty pouch when 1/3 to 1/2 full, ensure to clean pouch outlet after emptying to prevent odor, Notify WOC for ongoing pouch leakage, and Document stoma appearance and output volume, color, and consistency every shift     DATA:     Current support surface: Standard  Other: dane    BMI: Body mass index is 35.56 kg/m .   Active diet order: Orders Placed This Encounter      Full Liquid Diet     Output: I/O last 3 completed shifts:  In: 3676 [I.V.:3676]  Out: 1175 [Urine:980; Drains:45; Blood:150]     Labs:   Recent Labs   Lab 11/22/24  1258 11/22/24  0542 11/21/24  0725   ALBUMIN  --   --  3.7   PREALB  --   --  21.1   HGB 12.0* 12.5* 14.0   WBC  --  16.2* 14.2*     Pressure injury risk assessment:                        Pager no longer is use, please contact through [facility] group: [facility] Nurse [place]  Dept. Office Number: [phone]    ]

## 2024-11-22 NOTE — PROGRESS NOTES
"   Occupational Therapy Evaluation: 11/22/24 1401   Appointment Info   Signing Clinician's Name / Credentials (OT) Yodit Neri, OTD, OTR/L   Rehab Comments (OT) Ab Prec   Living Environment   People in Home spouse   Current Living Arrangements apartment   Home Accessibility stairs to enter home   Number of Stairs, Main Entrance greater than 10 stairs   Stair Railings, Main Entrance railings on both sides of stairs   Transportation Anticipated car, drives self;family or friend will provide   Living Environment Comments Pt reports living with spouse in an apartmnet. Once up stairs to enter apartment, no steps within unit. Tub shower in bathroom.   Self-Care   Usual Activity Tolerance good   Current Activity Tolerance moderate   Equipment Currently Used at Home shower chair;walker, standard;cane, straight  (Pt reports has access to however was not using.)   Fall history within last six months no   Activity/Exercise/Self-Care Comment Pt reports ind with ADLs and mobility at baseline.   Instrumental Activities of Daily Living (IADL)   Previous Responsibilities meal prep;shopping;driving;medication management;finances   IADL Comments Pt reports spouse assists with laundry and cleaning at baseline. Pt reporting ind with other IADLs.   General Information   Onset of Illness/Injury or Date of Surgery 11/21/24   Referring Physician Frandy Kauffman MD   Patient/Family Therapy Goal Statement (OT) Return home when able   Additional Occupational Profile Info/Pertinent History of Current Problem \"This is a 49 year old male  with Malignant neoplasm of rectosigmoid junction and diverticulitis with PMH: HLD, tobacco use, obesity, low back pain, cannabis use and alcohol abuse      POD 1  ROBOT-ASSISTED, CONVERTED TO OPEN, PROCTECTOMY, DIVERTING LOOP ILEOSTOMY; repair of umbilical hernia.\"   Existing Precautions/Restrictions abdominal;fall   Left Upper Extremity (Weight-bearing Status) partial weight-bearing (PWB)  (<10lb per ab " prec)   Right Upper Extremity (Weight-bearing Status) partial weight-bearing (PWB)  (<10lb per ab prec)   Left Lower Extremity (Weight-bearing Status) full weight-bearing (FWB)   Right Lower Extremity (Weight-bearing Status) full weight-bearing (FWB)   General Observations and Info Activity: up with assist   Cognitive Status Examination   Orientation Status orientation to person, place and time   Visual Perception   Visual Impairment/Limitations WFL   Sensory   Sensory Quick Adds sensation intact   Pain Assessment   Patient Currently in Pain Yes, see Vital Sign flowsheet   Posture   Posture not impaired   Range of Motion Comprehensive   General Range of Motion bilateral upper extremity ROM WFL   Strength Comprehensive (MMT)   Comment, General Manual Muscle Testing (MMT) Assessment Not formally assessed due to ab prec   Coordination   Upper Extremity Coordination No deficits were identified   Bed Mobility   Bed Mobility supine-sit   Comment (Bed Mobility) SBA   Transfers   Transfers toilet transfer   Transfer Comments SBA-CGA per clinical judgement   Activities of Daily Living   BADL Assessment/Intervention bathing;upper body dressing;lower body dressing;grooming;toileting   Bathing Assessment/Intervention   Webb Level (Bathing) moderate assist (50% patient effort)   Comment, (Bathing) Per clinical judgement   Upper Body Dressing Assessment/Training   Comment, (Upper Body Dressing) Per clinical judgement   Webb Level (Upper Body Dressing) set up   Lower Body Dressing Assessment/Training   Comment, (Lower Body Dressing) Per clinical judgement   Webb Level (Lower Body Dressing) moderate assist (50% patient effort)   Grooming Assessment/Training   Webb Level (Grooming) contact guard assist   Comment, (Grooming) Per clinical judgement   Toileting   Comment, (Toileting) Per clinical judgement   Webb Level (Toileting) contact guard assist   Clinical Impression   Criteria for Skilled  Therapeutic Interventions Met (OT) Yes, treatment indicated   OT Diagnosis Decreased ind with ADL/IADL and activity tolerance.   OT Problem List-Impairments impacting ADL problems related to;activity tolerance impaired;pain;post-surgical precautions;mobility   Assessment of Occupational Performance 3-5 Performance Deficits   Identified Performance Deficits Bathing, dressing, g/h, toileting, decreased activity tolerance   Planned Therapy Interventions (OT) ADL retraining;IADL retraining;strengthening;transfer training;home program guidelines;progressive activity/exercise;risk factor education   Clinical Decision Making Complexity (OT) problem focused assessment/low complexity   Risk & Benefits of therapy have been explained evaluation/treatment results reviewed;care plan/treatment goals reviewed;risks/benefits reviewed;current/potential barriers reviewed;participants voiced agreement with care plan;participants included;patient   Clinical Impression Comments Pt would benefit from skilled IP OT services to increase ind with ADLs/IADLS and activity tolerance while IP.   OT Total Evaluation Time   OT Eval, Low Complexity Minutes (56719) 6   OT Goals   Therapy Frequency (OT) Daily   OT Predicted Duration/Target Date for Goal Attainment 12/20/24   OT Goals Hygiene/Grooming;Upper Body Dressing;Lower Body Dressing;Upper Body Bathing;Lower Body Bathing;Toilet Transfer/Toileting;OT Goal 1   OT: Hygiene/Grooming modified independent   OT: Upper Body Dressing Modified independent   OT: Lower Body Dressing Modified independent   OT: Upper Body Bathing Modified independent   OT: Lower Body Bathing Modified independent   OT: Toilet Transfer/Toileting Modified independent   OT: Goal 1 Pt will complete tub transfer ind prior to discharge to ensure safety with bathing ADLs.   OT Discharge Planning   OT Plan Toileting, standing ADLs, tub transfer (pending pt pain)   OT Discharge Recommendation (DC Rec) home with assist   OT Rationale  for DC Rec Anticipate when pt is medically stable, would be safe to discharge home with assist from spouse PRN for heavier IADLs. Pt SBA bed mobility and STS this date.   OT Brief overview of current status SBA bed mobility and STS, 2ww for mobility

## 2024-11-22 NOTE — PLAN OF CARE
Goal Outcome Evaluation:  A&Ox4. AVSS, sats mid 90's on 2LPM NC. Abdominal pain is not well controlled with PCA dilaudid and scheduled tylenol; colorectal provider Dr. Quyen Fonseca is notified about pain.Warren with adequate red urine.MOHINDER with total of  40 ml serosang output. Ileostomy with 50 ml greenish liquid output.  Patient had apple juice and jello this morning and tolerated. Denied N/V. LR is infusing at 75 ml/hr. Pt was out of bed and stood at bed side with physical therapy and OT( twice today).Pt got education how to use incentive spirometer. Pt was seen by WOCN today regarding ileostomy.  Pt is transferred to  via a stretcher with his belongings escorted by transport personnel.   Continue with POC

## 2024-11-23 ENCOUNTER — APPOINTMENT (OUTPATIENT)
Dept: OCCUPATIONAL THERAPY | Facility: CLINIC | Age: 49
End: 2024-11-23
Attending: STUDENT IN AN ORGANIZED HEALTH CARE EDUCATION/TRAINING PROGRAM
Payer: COMMERCIAL

## 2024-11-23 ENCOUNTER — APPOINTMENT (OUTPATIENT)
Dept: PHYSICAL THERAPY | Facility: CLINIC | Age: 49
End: 2024-11-23
Attending: STUDENT IN AN ORGANIZED HEALTH CARE EDUCATION/TRAINING PROGRAM
Payer: COMMERCIAL

## 2024-11-23 LAB
ANION GAP SERPL CALCULATED.3IONS-SCNC: 7 MMOL/L (ref 7–15)
BUN SERPL-MCNC: 13.6 MG/DL (ref 6–20)
CALCIUM SERPL-MCNC: 8.3 MG/DL (ref 8.8–10.4)
CHLORIDE SERPL-SCNC: 96 MMOL/L (ref 98–107)
CREAT SERPL-MCNC: 1 MG/DL (ref 0.67–1.17)
EGFRCR SERPLBLD CKD-EPI 2021: >90 ML/MIN/1.73M2
ERYTHROCYTE [DISTWIDTH] IN BLOOD BY AUTOMATED COUNT: 14 % (ref 10–15)
GLUCOSE BLDC GLUCOMTR-MCNC: 130 MG/DL (ref 70–99)
GLUCOSE SERPL-MCNC: 129 MG/DL (ref 70–99)
HCO3 SERPL-SCNC: 28 MMOL/L (ref 22–29)
HCT VFR BLD AUTO: 32.9 % (ref 40–53)
HGB BLD-MCNC: 10.8 G/DL (ref 13.3–17.7)
MAGNESIUM SERPL-MCNC: 2.4 MG/DL (ref 1.7–2.3)
MCH RBC QN AUTO: 30.8 PG (ref 26.5–33)
MCHC RBC AUTO-ENTMCNC: 32.8 G/DL (ref 31.5–36.5)
MCV RBC AUTO: 94 FL (ref 78–100)
PHOSPHATE SERPL-MCNC: 3.5 MG/DL (ref 2.5–4.5)
PLATELET # BLD AUTO: 303 10E3/UL (ref 150–450)
POTASSIUM SERPL-SCNC: 4.5 MMOL/L (ref 3.4–5.3)
RBC # BLD AUTO: 3.51 10E6/UL (ref 4.4–5.9)
SODIUM SERPL-SCNC: 131 MMOL/L (ref 135–145)
WBC # BLD AUTO: 14.8 10E3/UL (ref 4–11)

## 2024-11-23 PROCEDURE — 84100 ASSAY OF PHOSPHORUS: CPT

## 2024-11-23 PROCEDURE — 250N000011 HC RX IP 250 OP 636

## 2024-11-23 PROCEDURE — 82374 ASSAY BLOOD CARBON DIOXIDE: CPT

## 2024-11-23 PROCEDURE — 85014 HEMATOCRIT: CPT

## 2024-11-23 PROCEDURE — 250N000011 HC RX IP 250 OP 636: Performed by: SURGERY

## 2024-11-23 PROCEDURE — 250N000013 HC RX MED GY IP 250 OP 250 PS 637: Performed by: SURGERY

## 2024-11-23 PROCEDURE — 97530 THERAPEUTIC ACTIVITIES: CPT | Mod: GO

## 2024-11-23 PROCEDURE — 250N000013 HC RX MED GY IP 250 OP 250 PS 637

## 2024-11-23 PROCEDURE — 36415 COLL VENOUS BLD VENIPUNCTURE: CPT

## 2024-11-23 PROCEDURE — 97116 GAIT TRAINING THERAPY: CPT | Mod: GP

## 2024-11-23 PROCEDURE — 80048 BASIC METABOLIC PNL TOTAL CA: CPT

## 2024-11-23 PROCEDURE — 258N000003 HC RX IP 258 OP 636: Performed by: SURGERY

## 2024-11-23 PROCEDURE — 83735 ASSAY OF MAGNESIUM: CPT

## 2024-11-23 PROCEDURE — 120N000002 HC R&B MED SURG/OB UMMC

## 2024-11-23 RX ORDER — OXYCODONE HYDROCHLORIDE 5 MG/1
5-10 TABLET ORAL EVERY 4 HOURS PRN
Status: DISCONTINUED | OUTPATIENT
Start: 2024-11-23 | End: 2024-11-24

## 2024-11-23 RX ORDER — NALOXONE HYDROCHLORIDE 0.4 MG/ML
0.2 INJECTION, SOLUTION INTRAMUSCULAR; INTRAVENOUS; SUBCUTANEOUS
Status: DISCONTINUED | OUTPATIENT
Start: 2024-11-23 | End: 2024-11-27 | Stop reason: HOSPADM

## 2024-11-23 RX ORDER — NALOXONE HYDROCHLORIDE 0.4 MG/ML
0.4 INJECTION, SOLUTION INTRAMUSCULAR; INTRAVENOUS; SUBCUTANEOUS
Status: DISCONTINUED | OUTPATIENT
Start: 2024-11-23 | End: 2024-11-27 | Stop reason: HOSPADM

## 2024-11-23 RX ORDER — CALCIUM CARBONATE 500 MG/1
500 TABLET, CHEWABLE ORAL DAILY PRN
Status: DISCONTINUED | OUTPATIENT
Start: 2024-11-23 | End: 2024-11-27 | Stop reason: HOSPADM

## 2024-11-23 RX ADMIN — ACETAMINOPHEN 1000 MG: 500 TABLET ORAL at 08:42

## 2024-11-23 RX ADMIN — ACETAMINOPHEN 1000 MG: 500 TABLET ORAL at 01:36

## 2024-11-23 RX ADMIN — OXYCODONE HYDROCHLORIDE 5 MG: 5 TABLET ORAL at 14:57

## 2024-11-23 RX ADMIN — ANTACID TABLETS 500 MG: 500 TABLET, CHEWABLE ORAL at 22:08

## 2024-11-23 RX ADMIN — ACETAMINOPHEN 1000 MG: 500 TABLET ORAL at 13:58

## 2024-11-23 RX ADMIN — Medication: at 02:02

## 2024-11-23 RX ADMIN — HEPARIN SODIUM 5000 UNITS: 5000 INJECTION, SOLUTION INTRAVENOUS; SUBCUTANEOUS at 01:36

## 2024-11-23 RX ADMIN — SODIUM CHLORIDE, POTASSIUM CHLORIDE, SODIUM LACTATE AND CALCIUM CHLORIDE: 600; 310; 30; 20 INJECTION, SOLUTION INTRAVENOUS at 06:54

## 2024-11-23 RX ADMIN — ACETAMINOPHEN 1000 MG: 500 TABLET ORAL at 20:25

## 2024-11-23 NOTE — PROGRESS NOTES
"/83 (BP Location: Left arm)   Pulse 80   Temp 99.2  F (37.3  C) (Oral)   Resp 20   Ht 1.702 m (5' 7\")   Wt 103 kg (227 lb 1.2 oz)   SpO2 92%   BMI 35.56 kg/m      Arrived to 7C at 1515 from PACU, POD 1 s/p ileostomy, prostectomy, hernia repair. Pain 4-8/10, PCA bumps are giving good relief. Tolerating full liquid diet, no nausea. SpO2 93% on room air. Up walking in [place] with sba, no dizziness or lightheadedness and he is steady on his feet. MOHINDER drain has minimal serosanguinous fluid in it. Leaking at insertion site - dressing changed. New ileostomy intact with minimal clear output in bag. Warren with adequate output and urine becoming less red since transfer to floor. Midline incision covered with primapore, some drainage present - per PACU RN, MDs are aware. Continue to encourage activity and nutrition as tolerated.   "

## 2024-11-23 NOTE — PROGRESS NOTES
Colorectal Surgery Progress Note  [facility]  POD#2  Procedure(s):  Cystoscopy, Temporary Insert Stent Bilateral Ureter  ROBOT-ASSISTED, CONVERTED TO OPEN, PROCTECTOMY, DIVERTING LOOP ILEOSTOMY; repair of umbilical hernia  Sigmoidoscopy flexible  OPEN PROCTECTOMY, DIVERTING LOOP ILEOSTOMY; repair of umbilical hernia     Subjective:  Doing well post op. Pain controlled. Tolerating full liquids although not drinking much.  Ostomy is functioning with gas and stool.    Vitals:  Vitals:    11/22/24 1318 11/22/24 1520 11/22/24 2240 11/23/24 0515   BP: 105/68 139/83 104/70 (!) 142/81   BP Location: Right arm Left arm Left arm Left arm   Patient Position:    Semi-Davila's   Cuff Size:    Adult Regular   Pulse:   64 77   Resp: 16 20 18 16   Temp: 98.9  F (37.2  C) 99.2  F (37.3  C) 98.9  F (37.2  C) 98.5  F (36.9  C)   TempSrc: Oral Oral Oral Oral   SpO2: 95% 92% 97% 93%   Weight:       Height:         I/O:  I/O last 3 completed shifts:  In: 1637.5 [P.O.:240; I.V.:1397.5]  Out: 1005 [Urine:875; Drains:80; Stool:50]    Physical Exam:  Gen: AAOx3, NAD  Pulm: Non-labored breathing  Abd: Soft, non-distended, appropriately tender, no guarding. Abdominal binder in place   Incision C/D/I with staples in place, vessel loop   Stoma pink and viable with stool and gas in bag   MOHINDER drain serosanguineous drainage   Ext:  Warm and well-perfused    BMP  Recent Labs   Lab 11/23/24  0534 11/22/24  0542 11/21/24  0725 11/21/24  0548   NA  --  133* 136  --    POTASSIUM  --  5.2 4.5  --    CHLORIDE  --  99 102  --    CO2  --  25 25  --    BUN  --  16.5 9.1  --    CR  --  1.10 0.77  --    * 140* 141* 136*   MAG  --  2.5* 2.3  --    PHOS  --  4.1  --   --      CBC  Recent Labs   Lab 11/22/24  1258 11/22/24  0542 11/21/24  0725   WBC  --  16.2* 14.2*   HGB 12.0* 12.5* 14.0   HCT 36.8* 37.8* 42.2   PLT  --  369 361         ASSESSMENT: This is a 49 year old male rectosigmoid cancer and diverticulitis s/p robotic  converted to open LAR and diverting loop ileostomy, repair of umbilical hernia on 11/21. Medical issues include HLD, tobacco use, obesity, low back pain, cannabis use and alcohol abuse     Today:   - continue holding subcutaneous heparin for drop in hgb, no evidence of bleeding  - general diet  - Discontinue IVF  - keep mathew until POD3    Neuro/Pain:   #Acute Post op pain  - tylenol, PCA dilaudid  - PRN oxycodone    CV:  #HLD  - normotensive  - continue to monitor    PULM: encourage IS and ambulation  - wean O2 as tolerated    GI/FEN:   - General Diet  - PRN antiemetics: zofran, compazine    :   - keep mathew  - monitor I&O    Heme:  -Acute blood loss anemia: Monitor hemoglobin, transfuse for hemoglobin less than 7    ID:   #Leukocytosis  - likely reactive    Endocrine:   #Obesity, BMI 35  - Glucose:     Lines: mathew  Activity: as tolerated  Ppx: SQH held  Dispo: floor care    Patient discussed with Dr. Luma Yanes MD  Colon and Rectal Surgery Fellow    =================================  For FIRST CALL, please contact the Colorectal Resident listed in [facility]'s directory under Colon & Rectal Surgery / [facility]  =================================

## 2024-11-23 NOTE — PLAN OF CARE
Goal Outcome Evaluation: Progressing       Plan of Care Reviewed With: patient    Overall Patient Progress: no changeOverall Patient Progress: no change    Outcome Evaluation: Pt alert and oriented x4. R/A during the day. Pt asked for 2 L while napping. Tolerated regular diet. oxycodone given x 1. Pt continues on PCA dilaudid gtt. Up with therapy, did well. Walker order placed. VSS. afebrile. No further concerns at this time. will continue to monitor closely.    Temp: 97.9  F (36.6  C) Temp src: Oral BP: 129/77 Pulse: 70   Resp: 18 SpO2: 99 % O2 Device: None (Room air) Oxygen Delivery: 2 LPM     Also, patient is having leaking/drainage at the MOHINDER bulb site. Colorectal Surgery aware per NOC RN.

## 2024-11-24 ENCOUNTER — APPOINTMENT (OUTPATIENT)
Dept: GENERAL RADIOLOGY | Facility: CLINIC | Age: 49
End: 2024-11-24
Attending: COLON & RECTAL SURGERY
Payer: COMMERCIAL

## 2024-11-24 ENCOUNTER — APPOINTMENT (OUTPATIENT)
Dept: OCCUPATIONAL THERAPY | Facility: CLINIC | Age: 49
End: 2024-11-24
Attending: STUDENT IN AN ORGANIZED HEALTH CARE EDUCATION/TRAINING PROGRAM
Payer: COMMERCIAL

## 2024-11-24 ENCOUNTER — APPOINTMENT (OUTPATIENT)
Dept: PHYSICAL THERAPY | Facility: CLINIC | Age: 49
End: 2024-11-24
Attending: STUDENT IN AN ORGANIZED HEALTH CARE EDUCATION/TRAINING PROGRAM
Payer: COMMERCIAL

## 2024-11-24 LAB
ANION GAP SERPL CALCULATED.3IONS-SCNC: 12 MMOL/L (ref 7–15)
BUN SERPL-MCNC: 8.4 MG/DL (ref 6–20)
CALCIUM SERPL-MCNC: 8.9 MG/DL (ref 8.8–10.4)
CHLORIDE SERPL-SCNC: 94 MMOL/L (ref 98–107)
CREAT SERPL-MCNC: 0.75 MG/DL (ref 0.67–1.17)
EGFRCR SERPLBLD CKD-EPI 2021: >90 ML/MIN/1.73M2
ERYTHROCYTE [DISTWIDTH] IN BLOOD BY AUTOMATED COUNT: 13.5 % (ref 10–15)
GLUCOSE SERPL-MCNC: 129 MG/DL (ref 70–99)
HCO3 SERPL-SCNC: 25 MMOL/L (ref 22–29)
HCT VFR BLD AUTO: 34.1 % (ref 40–53)
HGB BLD-MCNC: 11.3 G/DL (ref 13.3–17.7)
MAGNESIUM SERPL-MCNC: 2.1 MG/DL (ref 1.7–2.3)
MCH RBC QN AUTO: 31.2 PG (ref 26.5–33)
MCHC RBC AUTO-ENTMCNC: 33.1 G/DL (ref 31.5–36.5)
MCV RBC AUTO: 94 FL (ref 78–100)
PHOSPHATE SERPL-MCNC: 4 MG/DL (ref 2.5–4.5)
PLATELET # BLD AUTO: 319 10E3/UL (ref 150–450)
POTASSIUM SERPL-SCNC: 4.4 MMOL/L (ref 3.4–5.3)
RBC # BLD AUTO: 3.62 10E6/UL (ref 4.4–5.9)
SODIUM SERPL-SCNC: 131 MMOL/L (ref 135–145)
WBC # BLD AUTO: 15.1 10E3/UL (ref 4–11)

## 2024-11-24 PROCEDURE — 84100 ASSAY OF PHOSPHORUS: CPT

## 2024-11-24 PROCEDURE — 85018 HEMOGLOBIN: CPT | Performed by: COLON & RECTAL SURGERY

## 2024-11-24 PROCEDURE — 120N000002 HC R&B MED SURG/OB UMMC

## 2024-11-24 PROCEDURE — 250N000011 HC RX IP 250 OP 636: Performed by: SURGERY

## 2024-11-24 PROCEDURE — 250N000011 HC RX IP 250 OP 636: Performed by: COLON & RECTAL SURGERY

## 2024-11-24 PROCEDURE — 85041 AUTOMATED RBC COUNT: CPT | Performed by: COLON & RECTAL SURGERY

## 2024-11-24 PROCEDURE — 83735 ASSAY OF MAGNESIUM: CPT

## 2024-11-24 PROCEDURE — 250N000013 HC RX MED GY IP 250 OP 250 PS 637: Performed by: SURGERY

## 2024-11-24 PROCEDURE — 36415 COLL VENOUS BLD VENIPUNCTURE: CPT | Performed by: COLON & RECTAL SURGERY

## 2024-11-24 PROCEDURE — 74019 RADEX ABDOMEN 2 VIEWS: CPT | Mod: 26 | Performed by: RADIOLOGY

## 2024-11-24 PROCEDURE — 97535 SELF CARE MNGMENT TRAINING: CPT | Mod: GO

## 2024-11-24 PROCEDURE — 74019 RADEX ABDOMEN 2 VIEWS: CPT

## 2024-11-24 PROCEDURE — 258N000003 HC RX IP 258 OP 636: Performed by: COLON & RECTAL SURGERY

## 2024-11-24 PROCEDURE — 97530 THERAPEUTIC ACTIVITIES: CPT | Mod: GO

## 2024-11-24 PROCEDURE — 80048 BASIC METABOLIC PNL TOTAL CA: CPT | Performed by: COLON & RECTAL SURGERY

## 2024-11-24 PROCEDURE — 82565 ASSAY OF CREATININE: CPT | Performed by: COLON & RECTAL SURGERY

## 2024-11-24 PROCEDURE — 97530 THERAPEUTIC ACTIVITIES: CPT | Mod: GP

## 2024-11-24 PROCEDURE — 97116 GAIT TRAINING THERAPY: CPT | Mod: GP

## 2024-11-24 RX ORDER — CALCIUM GLUCONATE 20 MG/ML
2 INJECTION, SOLUTION INTRAVENOUS ONCE
Status: COMPLETED | OUTPATIENT
Start: 2024-11-24 | End: 2024-11-24

## 2024-11-24 RX ORDER — METOCLOPRAMIDE HYDROCHLORIDE 5 MG/ML
5 INJECTION INTRAMUSCULAR; INTRAVENOUS EVERY 8 HOURS
Status: DISCONTINUED | OUTPATIENT
Start: 2024-11-24 | End: 2024-11-24

## 2024-11-24 RX ORDER — DEXTROSE MONOHYDRATE, SODIUM CHLORIDE, AND POTASSIUM CHLORIDE 50; 1.49; 4.5 G/1000ML; G/1000ML; G/1000ML
INJECTION, SOLUTION INTRAVENOUS CONTINUOUS
Status: DISCONTINUED | OUTPATIENT
Start: 2024-11-24 | End: 2024-11-25

## 2024-11-24 RX ORDER — ACETAMINOPHEN 500 MG
1000 TABLET ORAL EVERY 6 HOURS
Status: DISCONTINUED | OUTPATIENT
Start: 2024-11-24 | End: 2024-11-27 | Stop reason: HOSPADM

## 2024-11-24 RX ORDER — KETOROLAC TROMETHAMINE 30 MG/ML
15 INJECTION, SOLUTION INTRAMUSCULAR; INTRAVENOUS EVERY 6 HOURS
Status: DISCONTINUED | OUTPATIENT
Start: 2024-11-24 | End: 2024-11-24

## 2024-11-24 RX ORDER — ENOXAPARIN SODIUM 100 MG/ML
40 INJECTION SUBCUTANEOUS EVERY 24 HOURS
Status: DISCONTINUED | OUTPATIENT
Start: 2024-11-24 | End: 2024-11-27 | Stop reason: HOSPADM

## 2024-11-24 RX ORDER — ATORVASTATIN CALCIUM 10 MG/1
10 TABLET, FILM COATED ORAL EVERY EVENING
Status: DISCONTINUED | OUTPATIENT
Start: 2024-11-24 | End: 2024-11-24

## 2024-11-24 RX ADMIN — METOCLOPRAMIDE 5 MG: 5 INJECTION, SOLUTION INTRAMUSCULAR; INTRAVENOUS at 10:38

## 2024-11-24 RX ADMIN — ACETAMINOPHEN 1000 MG: 500 TABLET ORAL at 19:19

## 2024-11-24 RX ADMIN — POTASSIUM CHLORIDE, DEXTROSE MONOHYDRATE AND SODIUM CHLORIDE: 150; 5; 450 INJECTION, SOLUTION INTRAVENOUS at 10:38

## 2024-11-24 RX ADMIN — Medication: at 10:45

## 2024-11-24 RX ADMIN — ACETAMINOPHEN 1000 MG: 500 TABLET ORAL at 01:23

## 2024-11-24 RX ADMIN — ENOXAPARIN SODIUM 40 MG: 40 INJECTION SUBCUTANEOUS at 10:38

## 2024-11-24 RX ADMIN — Medication: at 09:10

## 2024-11-24 RX ADMIN — OXYCODONE HYDROCHLORIDE 10 MG: 5 TABLET ORAL at 01:58

## 2024-11-24 RX ADMIN — FAMOTIDINE 20 MG: 10 INJECTION, SOLUTION INTRAVENOUS at 21:59

## 2024-11-24 RX ADMIN — FAMOTIDINE 20 MG: 10 INJECTION, SOLUTION INTRAVENOUS at 10:37

## 2024-11-24 RX ADMIN — KETOROLAC TROMETHAMINE 15 MG: 30 INJECTION, SOLUTION INTRAMUSCULAR at 18:27

## 2024-11-24 RX ADMIN — KETOROLAC TROMETHAMINE 15 MG: 30 INJECTION, SOLUTION INTRAMUSCULAR at 10:37

## 2024-11-24 RX ADMIN — CALCIUM GLUCONATE 2 G: 20 INJECTION, SOLUTION INTRAVENOUS at 10:38

## 2024-11-24 RX ADMIN — METOCLOPRAMIDE 5 MG: 5 INJECTION, SOLUTION INTRAMUSCULAR; INTRAVENOUS at 18:27

## 2024-11-24 RX ADMIN — OXYCODONE HYDROCHLORIDE 10 MG: 5 TABLET ORAL at 06:12

## 2024-11-24 RX ADMIN — ACETAMINOPHEN 1000 MG: 500 TABLET ORAL at 09:10

## 2024-11-24 NOTE — PLAN OF CARE
Goal Outcome Evaluation:    Plan of Care Reviewed With: patient    Overall Patient Progress: no change  Overall Patient Progress: no change    Outcome Evaluation: Assumed cares from 1716-7784. Pt is alert and oriented x4, assist x1 out of bed, vitally stable, on 3L NC overnight for comfort. Ileostomy intact with minimal output. Warren with good output overnight, see flowsheet. MOHINDER drain with minimal output, was leaking, dressing changed x2 overnight - MD aware of leaking. Midline abdominal incision open to air. Abdominal binder maintained. Dilaudid PCA infusing with 75mL/hr LR in L PIV. PRN Tums administered for heart burn with relief. PRN Oxycodone administered x2 for breakthrough pain, this provided relief. Continue with plan of care.     Caryn Ramires RN

## 2024-11-24 NOTE — PROGRESS NOTES
Attestation:  I, Erwin Ge MD, saw and evaluated Bella Quintero as part of a shared visit. I have reviewed and discussed with the advanced practice provider their history, physical and plan.  I personally reviewed the vital signs, medications and labs.  My key history or physical exam findings: Nontoxic-appearing but may be starting an ileus..  Key management decisions made by me: Revert to full liquid diet, space out PCA, remove Warren, keep maintenance fluids at 50, Multimodal pain control.  Pepcid, Reglan, and calcium gluconate.  Will check abdominal x-ray today to assess for gastric distention/bubble.    Erwin Ge M.D., M.Sc.    Colon and Rectal Surgery  Pager: [phone].    November 24, 2024     Colorectal Surgery Progress Note  [facility]  POD#3  Procedure(s):  Cystoscopy, Temporary Insert Stent Bilateral Ureter  ROBOT-ASSISTED, CONVERTED TO OPEN, PROCTECTOMY, DIVERTING LOOP ILEOSTOMY; repair of umbilical hernia  Sigmoidoscopy flexible  OPEN PROCTECTOMY, DIVERTING LOOP ILEOSTOMY; repair of umbilical hernia     Subjective: More distended and nauseated today.  Pain 5/10.  Good urine output.  Already ambulated once this morning.    Vitals:  Vitals:    11/23/24 0929 11/23/24 1424 11/23/24 2130 11/24/24 0510   BP:  129/77 124/70 (!) 144/93   BP Location:  Left arm Left arm Left arm   Patient Position:   Semi-Davila's Semi-Davila's   Cuff Size:   Adult Regular Adult Regular   Pulse:  70 66 76   Resp:  18 16 16   Temp:  97.9  F (36.6  C) 99.1  F (37.3  C) 98.4  F (36.9  C)   TempSrc:  Oral Oral Oral   SpO2: 94% 99% 99% 99%   Weight:       Height:         I/O:  I/O last 3 completed shifts:  In: 73 [I.V.:73]  Out: 2520 [Urine:2500; Drains:20]    Physical Exam:  Gen: AAOx3, NAD  Pulm: Non-labored breathing  Abd: Soft, distended, appropriately tender, no guarding. Abdominal binder in place   Incision C/D/I with staples in place, vessel  loop   Ileostomy viable with some gas and liquid stool in bag.  Minimal.     MOHINDER drain serosanguineous drainage   Ext:  Warm and well-perfused    BMP  Recent Labs   Lab 11/24/24  0625 11/23/24  0557 11/23/24  0534 11/22/24  0542 11/21/24  0725   * 131*  --  133* 136   POTASSIUM 4.4 4.5  --  5.2 4.5   CHLORIDE 94* 96*  --  99 102   CO2 25 28  --  25 25   BUN 8.4 13.6  --  16.5 9.1   CR 0.75 1.00  --  1.10 0.77   * 129* 130* 140* 141*   MAG 2.1 2.4*  --  2.5* 2.3   PHOS 4.0 3.5  --  4.1  --      CBC  Recent Labs   Lab 11/24/24  0625 11/23/24  0557 11/22/24  1258 11/22/24  0542 11/21/24  0725   WBC 15.1* 14.8*  --  16.2* 14.2*   HGB 11.3* 10.8* 12.0* 12.5* 14.0   HCT 34.1* 32.9* 36.8* 37.8* 42.2    303  --  369 361         ASSESSMENT: This is a 49 year old male rectosigmoid cancer and diverticulitis s/p robotic converted to open LAR and diverting loop ileostomy, repair of umbilical hernia on 11/21. Medical issues include HLD, tobacco use, obesity, low back pain, cannabis use and alcohol abuse     Today:   - continue holding subcutaneous heparin for drop in hgb, no evidence of bleeding  - general diet  - Discontinue IVF  - keep mathew until POD3    Neuro/Pain:   #Acute Post op pain  - tylenol, PCA dilaudid  - PRN oxycodone    CV:  #HLD  - normotensive  - continue to monitor    PULM: encourage IS and ambulation  - wean O2 as tolerated    GI/FEN:   - General Diet  - PRN antiemetics: zofran, compazine    :   - keep mathew  - monitor I&O    Heme:  -Acute blood loss anemia: Monitor hemoglobin, transfuse for hemoglobin less than 7    ID:   #Leukocytosis  - likely reactive    Endocrine:   #Obesity, BMI 35  - Glucose:     Lines: mathew  Activity: as tolerated  Ppx: SQH held  Dispo: floor care    Patient discussed with Dr. Luma Yanes MD  Colon and Rectal Surgery Fellow    =================================  For FIRST CALL, please contact the Colorectal Resident listed in [facility]'s directory under  Colon & Rectal Surgery / [facility]  =================================

## 2024-11-25 ENCOUNTER — APPOINTMENT (OUTPATIENT)
Dept: PHYSICAL THERAPY | Facility: CLINIC | Age: 49
End: 2024-11-25
Attending: STUDENT IN AN ORGANIZED HEALTH CARE EDUCATION/TRAINING PROGRAM
Payer: COMMERCIAL

## 2024-11-25 ENCOUNTER — APPOINTMENT (OUTPATIENT)
Dept: OCCUPATIONAL THERAPY | Facility: CLINIC | Age: 49
End: 2024-11-25
Attending: STUDENT IN AN ORGANIZED HEALTH CARE EDUCATION/TRAINING PROGRAM
Payer: COMMERCIAL

## 2024-11-25 PROBLEM — N17.9 ACUTE KIDNEY FAILURE, UNSPECIFIED (H): Status: ACTIVE | Noted: 2024-11-25

## 2024-11-25 LAB
ANION GAP SERPL CALCULATED.3IONS-SCNC: 7 MMOL/L (ref 7–15)
BUN SERPL-MCNC: 11.3 MG/DL (ref 6–20)
CALCIUM SERPL-MCNC: 8.6 MG/DL (ref 8.8–10.4)
CHLORIDE SERPL-SCNC: 95 MMOL/L (ref 98–107)
CREAT SERPL-MCNC: 1.24 MG/DL (ref 0.67–1.17)
EGFRCR SERPLBLD CKD-EPI 2021: 71 ML/MIN/1.73M2
ERYTHROCYTE [DISTWIDTH] IN BLOOD BY AUTOMATED COUNT: 13.4 % (ref 10–15)
GLUCOSE SERPL-MCNC: 101 MG/DL (ref 70–99)
HCO3 SERPL-SCNC: 27 MMOL/L (ref 22–29)
HCT VFR BLD AUTO: 31.2 % (ref 40–53)
HGB BLD-MCNC: 10 G/DL (ref 13.3–17.7)
MAGNESIUM SERPL-MCNC: 2.1 MG/DL (ref 1.7–2.3)
MCH RBC QN AUTO: 30.9 PG (ref 26.5–33)
MCHC RBC AUTO-ENTMCNC: 32.1 G/DL (ref 31.5–36.5)
MCV RBC AUTO: 96 FL (ref 78–100)
PHOSPHATE SERPL-MCNC: 4 MG/DL (ref 2.5–4.5)
PLATELET # BLD AUTO: 304 10E3/UL (ref 150–450)
POTASSIUM SERPL-SCNC: 4 MMOL/L (ref 3.4–5.3)
RBC # BLD AUTO: 3.24 10E6/UL (ref 4.4–5.9)
SODIUM SERPL-SCNC: 129 MMOL/L (ref 135–145)
WBC # BLD AUTO: 10.3 10E3/UL (ref 4–11)

## 2024-11-25 PROCEDURE — 83735 ASSAY OF MAGNESIUM: CPT

## 2024-11-25 PROCEDURE — 250N000013 HC RX MED GY IP 250 OP 250 PS 637: Performed by: SURGERY

## 2024-11-25 PROCEDURE — 97530 THERAPEUTIC ACTIVITIES: CPT | Mod: GP

## 2024-11-25 PROCEDURE — 84520 ASSAY OF UREA NITROGEN: CPT | Performed by: COLON & RECTAL SURGERY

## 2024-11-25 PROCEDURE — 120N000002 HC R&B MED SURG/OB UMMC

## 2024-11-25 PROCEDURE — 36415 COLL VENOUS BLD VENIPUNCTURE: CPT | Performed by: COLON & RECTAL SURGERY

## 2024-11-25 PROCEDURE — 97116 GAIT TRAINING THERAPY: CPT | Mod: GP

## 2024-11-25 PROCEDURE — 250N000009 HC RX 250

## 2024-11-25 PROCEDURE — 999N000157 HC STATISTIC RCP TIME EA 10 MIN

## 2024-11-25 PROCEDURE — 97530 THERAPEUTIC ACTIVITIES: CPT | Mod: GO | Performed by: OCCUPATIONAL THERAPIST

## 2024-11-25 PROCEDURE — 94640 AIRWAY INHALATION TREATMENT: CPT

## 2024-11-25 PROCEDURE — 250N000013 HC RX MED GY IP 250 OP 250 PS 637

## 2024-11-25 PROCEDURE — 97535 SELF CARE MNGMENT TRAINING: CPT | Mod: GO | Performed by: OCCUPATIONAL THERAPIST

## 2024-11-25 PROCEDURE — 85014 HEMATOCRIT: CPT | Performed by: COLON & RECTAL SURGERY

## 2024-11-25 PROCEDURE — 250N000011 HC RX IP 250 OP 636: Performed by: COLON & RECTAL SURGERY

## 2024-11-25 PROCEDURE — 258N000003 HC RX IP 258 OP 636

## 2024-11-25 PROCEDURE — 85041 AUTOMATED RBC COUNT: CPT | Performed by: COLON & RECTAL SURGERY

## 2024-11-25 PROCEDURE — 80048 BASIC METABOLIC PNL TOTAL CA: CPT | Performed by: COLON & RECTAL SURGERY

## 2024-11-25 PROCEDURE — 84100 ASSAY OF PHOSPHORUS: CPT

## 2024-11-25 PROCEDURE — G0463 HOSPITAL OUTPT CLINIC VISIT: HCPCS

## 2024-11-25 RX ORDER — OXYCODONE HYDROCHLORIDE 5 MG/1
5 TABLET ORAL EVERY 4 HOURS PRN
Status: DISCONTINUED | OUTPATIENT
Start: 2024-11-25 | End: 2024-11-27 | Stop reason: HOSPADM

## 2024-11-25 RX ORDER — OXYCODONE HYDROCHLORIDE 10 MG/1
10 TABLET ORAL EVERY 4 HOURS PRN
Status: DISCONTINUED | OUTPATIENT
Start: 2024-11-25 | End: 2024-11-27 | Stop reason: HOSPADM

## 2024-11-25 RX ORDER — IPRATROPIUM BROMIDE AND ALBUTEROL SULFATE 2.5; .5 MG/3ML; MG/3ML
3 SOLUTION RESPIRATORY (INHALATION) 2 TIMES DAILY
Status: DISCONTINUED | OUTPATIENT
Start: 2024-11-25 | End: 2024-11-27 | Stop reason: HOSPADM

## 2024-11-25 RX ORDER — DEXTROSE MONOHYDRATE AND SODIUM CHLORIDE 5; .9 G/100ML; G/100ML
INJECTION, SOLUTION INTRAVENOUS CONTINUOUS
Status: DISCONTINUED | OUTPATIENT
Start: 2024-11-25 | End: 2024-11-26

## 2024-11-25 RX ADMIN — OXYCODONE HYDROCHLORIDE 10 MG: 10 TABLET ORAL at 10:03

## 2024-11-25 RX ADMIN — ACETAMINOPHEN 1000 MG: 500 TABLET ORAL at 13:56

## 2024-11-25 RX ADMIN — DEXTROSE AND SODIUM CHLORIDE: 5; 900 INJECTION, SOLUTION INTRAVENOUS at 10:46

## 2024-11-25 RX ADMIN — OXYCODONE HYDROCHLORIDE 10 MG: 10 TABLET ORAL at 19:59

## 2024-11-25 RX ADMIN — SODIUM CHLORIDE 1000 ML: 9 INJECTION, SOLUTION INTRAVENOUS at 09:11

## 2024-11-25 RX ADMIN — ACETAMINOPHEN 1000 MG: 500 TABLET ORAL at 08:56

## 2024-11-25 RX ADMIN — FAMOTIDINE 20 MG: 10 INJECTION, SOLUTION INTRAVENOUS at 21:56

## 2024-11-25 RX ADMIN — ACETAMINOPHEN 1000 MG: 500 TABLET ORAL at 01:17

## 2024-11-25 RX ADMIN — ENOXAPARIN SODIUM 40 MG: 40 INJECTION SUBCUTANEOUS at 09:34

## 2024-11-25 RX ADMIN — FAMOTIDINE 20 MG: 10 INJECTION, SOLUTION INTRAVENOUS at 09:34

## 2024-11-25 RX ADMIN — ACETAMINOPHEN 1000 MG: 500 TABLET ORAL at 19:58

## 2024-11-25 RX ADMIN — IPRATROPIUM BROMIDE AND ALBUTEROL SULFATE 3 ML: .5; 3 SOLUTION RESPIRATORY (INHALATION) at 20:34

## 2024-11-25 RX ADMIN — OXYCODONE HYDROCHLORIDE 10 MG: 10 TABLET ORAL at 15:16

## 2024-11-25 ASSESSMENT — ACTIVITIES OF DAILY LIVING (ADL)
ADLS_ACUITY_SCORE: 0
ADLS_ACUITY_SCORE: 40
ADLS_ACUITY_SCORE: 0
ADLS_ACUITY_SCORE: 40
ADLS_ACUITY_SCORE: 40
ADLS_ACUITY_SCORE: 0
ADLS_ACUITY_SCORE: 40
ADLS_ACUITY_SCORE: 0
ADLS_ACUITY_SCORE: 0
ADLS_ACUITY_SCORE: 40
ADLS_ACUITY_SCORE: 40
ADLS_ACUITY_SCORE: 0
ADLS_ACUITY_SCORE: 40
ADLS_ACUITY_SCORE: 0

## 2024-11-25 NOTE — PLAN OF CARE
Goal Outcome Evaluation:      Plan of Care Reviewed With: patient    Overall Patient Progress: improvingOverall Patient Progress: improving     Shift Hours: 0700 - 1900  Assessment/Interventions:  Body systems that were not at patient's baseline: Gastrointestinal. Focused body system assessments documented in flow sheets.  1L NS bolus given in AM. O2 stable on RA. Midline abd incision WDL, abdominal binder worn.     Activity     Fall Risk Score: 20   Bed alarm on? No      Mobility: SBA    Pain: PCA discontinued during shift. Abdominal pain managed with scheduled pain meds and PRN oxy.     Labs/RN Managed Protocols:Labs stable in AM    Lines/Drains: LLQ bulb suction drain having pink-red drainage. Leaking around site, dressing changed X3 during shift. Colorectal team paged to notify of leaking, no response during shift. Colostomy having good watery output, pouch changed by WOC nurse in AM. PIV running maintenance dextrose @50ml/hr    Nutrition: Pt tolerating regular diet with good appetite.     Goal Outcome Evaluation/Barriers to Discharge: Possible discharge in coming days when drain removed and pain managed.

## 2024-11-25 NOTE — PROGRESS NOTES
[facility]  WO Nurse Inpatient Assessment     Consulted for: new ileostomy    Patient History (according to provider note(s):      49 year old male  with Malignant neoplasm of rectosigmoid junction and diverticulitis with PMH: HLD, tobacco use, obesity, low back pain, cannabis use and alcohol abuse     Assessment:      Areas visualized during today's visit: Focused: and Abdomen    Assessment of new loop Ileostomy:  Diagnosis Pertinent to Stoma: Diverticulitis      Surgery Date: 11/22/24  Surgeon:Frandy Kauffman       Hospital: [facility]  Pouching system in place on assessment today: [place]chis two piece, cut to fit, and flat   Pouch barrier status: Minimal melting   Pouch last changed/wear time: 3 days  Reason for pouch change today: ostomy education and routine schedule  Effectiveness of current pouching/ supply plan: Recommend continuing current plan  Change made with ostomy management today: No  Pouching system placed today: [place] two piece and cut to fit with barrier ring  Supplies: gathered and discussed with patient      Last photo: 11/22/24  Stoma location: RLQ  Stoma size: 1 1/4 x 1 3/4 inches,   Stoma appearance: red, moist, good protrusion but empties at skin level at 8 o'clock  Mucocutaneous junction:  intact  Peristomal complication(s): mechanical damage - fading bruised appearance at 9 o'clock  Output: gas, thin paste consistency, and brown  Output volume emptied during visit: 50ml  Abdominal assessment: Firm  Surgical site(s): open to air  NG still in place? No  Pain: Sharp, Dull ache, and Fullness  Is patient still on a PCA? No    Ostomy education assessment:  Participant of teaching session today: patient  and spouse  Education completed today: Pouching system assessment , Refitting of appliance , Adjustment of pouching plan, Pouch change return demonstration, Ostomy accessory product use , Peristomal skin care, Pouch emptying return demonstration, Intake  "and output recording, Fluid and electrolyte balance , Importance of hydration, and Low fiber diet   Educational materials/methods: Verbal, Demonstration, Return demonstration, and FOD [place] education hand out  Education still needed: Pouch change return demonstration, Pouch emptying return demonstration, When to seek medical attention, Odor/flatus management , Infection prevention/hygiene , Hernia prevention, Lifestyle adjustments , and Discharge instructions  Learning Comprehension:   Psychosocial assessment: lives with his spouse who is willing to assist with cares.  Also has a friend who may be willing to assist-he lives in the pt's apartment complex  Patient readiness for education today: observing, attentive, and active participation  Following today's visit: patient and spouse are able to demonstrate;         1. How to empty their pouch? Yes and with minimal assist        2. How to change their pouch? Yes and with moderate assist        3. How to read and record intake and output correctly? Yes  Preparation for discharge completed: No discharge preparation started yet  Preparation for discharge still needed: Placed prescription recommendations in discharge navigator for MD to sign, Ensured patient has extra supplies for discharge, Discussed how to order supplies after discharge , Ordered samples from  after gaining consent from patient/caregiver, Discussed how and when to make an outpatient WOC nurse appointment after discharge, Prepared for discharge home with home care, and Discuss signs/symptoms of when to seek medical attention  WOC recommend home care? Yes and By WOC RN if possible  Face to face time: 45 minutes       Treatment Plan:     RLQ Ileostomy pouching plan:   Pouching system: ostomy supplies pouches: [place] 57 FECAL ([id number]) ostomy supplies barrier: Conchis 57mm FLAT ([id number])  Accessories used: WO ostomy accessories: 2\" Cera Barrier Ring ([id number])   Frequency of pouch changes: " PRN leakage and Three times a week  WOC follow up plan: Daily Monday-Friday (as able)  Bedside RN interventions: Change pouch PRN if leaking using the supplies above, Empty pouch when 1/3 to 1/2 full, ensure to clean pouch outlet after emptying to prevent odor, Notify WOC for ongoing pouch leakage, and Document stoma appearance and output volume, color, and consistency every shift     DATA:     Current support surface: Standard  Other: gurney    BMI: Body mass index is 35.04 kg/m .   Active diet order: Orders Placed This Encounter      Regular Diet Adult     Output: I/O last 3 completed shifts:  In: 521.9 [P.O.:480; I.V.:41.9]  Out: 1715 [Urine:1600; Drains:15; Stool:100]     Labs:   Recent Labs   Lab 11/25/24  0555 11/22/24  0542 11/21/24  0725   ALBUMIN  --   --  3.7   PREALB  --   --  21.1   HGB 10.0*   < > 14.0   WBC 10.3   < > 14.2*    < > = values in this interval not displayed.     Pressure injury risk assessment:   Sensory Perception: 4-->no impairment  Moisture: 4-->rarely moist  Activity: 3-->walks occasionally  Mobility: 4-->no limitation  Nutrition: 2-->probably inadequate  Friction and Shear: 3-->no apparent problem  Anatoliy Score: 20  Pager no longer is use, please contact through [facility] group: [facility]  Dept. Office Number: [phone]    ]

## 2024-11-25 NOTE — PLAN OF CARE
Goal Outcome Evaluation:    Plan of Care Reviewed With: patient    Overall Patient Progress: no change  Overall Patient Progress: no change    Outcome Evaluation: Assumed cares from 4830-0928. Pt is alert and oriented x4, assist x1 out of bed, on 3L NC overnight per request/comfort of pt. Voiding spontaneously after mathew removal 11/24. Midline abdominal incision open to air; MOHINDER drain leaking, dressing changed 4x overnight, MD aware. PIV in L forearm - Dilaudid PCA in use with relief - LR running at 50 mL/hr. Continue with plan of care.     Caryn Ramires RN

## 2024-11-25 NOTE — PROGRESS NOTES
Colorectal Surgery Progress Note  [facility]  POD#4  Procedure(s):  Cystoscopy, Temporary Insert Stent Bilateral Ureter  ROBOT-ASSISTED, CONVERTED TO OPEN, PROCTECTOMY, DIVERTING LOOP ILEOSTOMY; repair of umbilical hernia  Sigmoidoscopy flexible  OPEN PROCTECTOMY, DIVERTING LOOP ILEOSTOMY; repair of umbilical hernia     Subjective: Tolerated FLD, still has belching/burping, no nausea no vomiting. Not eager to advance diet. Walked hallways yesterday. Wean off O2. Discontinue PCA this AM.       Vitals:  Vitals:    11/24/24 1421 11/24/24 2200 11/25/24 0117 11/25/24 0420   BP: 120/74 134/80  136/83   BP Location: Left arm Left arm  Left arm   Patient Position:  Semi-Davila's  Semi-Davila's   Cuff Size:  Adult Regular  Adult Regular   Pulse: 67 63  65   Resp: 18 18  18   Temp: 98.1  F (36.7  C) 98.3  F (36.8  C)  98  F (36.7  C)   TempSrc: Oral Oral  Oral   SpO2: 95% 93% 96% 98%   Weight:       Height:         I/O:  I/O last 3 completed shifts:  In: 521.9 [P.O.:480; I.V.:41.9]  Out: 1715 [Urine:1600; Drains:15; Stool:100]    Physical Exam:  Gen: AAOx3, NAD  Pulm: Non-labored breathing  Abd: Soft, distended, appropriately tender, no guarding. Abdominal binder in place   Incision C/D/I with staples in place, vessel loop   Ileostomy viable with some gas and liquid stool in bag.     MOHINDER drain serosanguineous drainage   Ext:  Warm and well-perfused    BMP  Recent Labs   Lab 11/25/24  0555 11/24/24  0625 11/23/24  0557 11/23/24  0534 11/22/24  0542   * 131* 131*  --  133*   POTASSIUM 4.0 4.4 4.5  --  5.2   CHLORIDE 95* 94* 96*  --  99   CO2 27 25 28  --  25   BUN 11.3 8.4 13.6  --  16.5   CR 1.24* 0.75 1.00  --  1.10   * 129* 129* 130* 140*   MAG 2.1 2.1 2.4*  --  2.5*   PHOS 4.0 4.0 3.5  --  4.1     CBC  Recent Labs   Lab 11/25/24  0555 11/24/24  0625 11/23/24  0557 11/22/24  1258 11/22/24  0542   WBC 10.3 15.1* 14.8*  --  16.2*   HGB 10.0* 11.3* 10.8* 12.0* 12.5*   HCT 31.2* 34.1*  32.9* 36.8* 37.8*    319 303  --  369         ASSESSMENT: This is a 49 year old male rectosigmoid cancer and diverticulitis s/p robotic converted to open LAR and diverting loop ileostomy, repair of umbilical hernia on 11/21. Medical issues include HLD, tobacco use, obesity, low back pain, cannabis use and alcohol abuse     Today:   - discontinue dilaudid  - D5NS mIVF 50  - 1L NS bolus  - oxycodone 5/10mg  - Regular diet    Neuro/Pain:   #Acute Post op pain  - tylenol  - PRN oxycodone  - discontinue dilaudid    CV:  #HLD  - normotensive  - continue to monitor    PULM: encourage IS and ambulation  - wean O2 as tolerated  - Duoneb Bid    GI/FEN:   #GERD  - Regular Diet  - PRN antiemetics: zofran, compazine  - reglan IV 11/24  - pepcid IV Q12  - PRN tums    :   - voiding  #VICKI  - Cr 1.24  - 1Liter NS bolus  #Hyponatremia  - 129  - D5NS mIVF 50    Heme:  -Acute blood loss anemia: Monitor hemoglobin, transfuse for hemoglobin less than 7  - Hgb 10.0    ID:   #Leukocytosis, resolved   - WBC 10.3    Endocrine:   #Obesity, BMI 35  - Glucose:     Lines: PIV  Activity: as tolerated  Ppx: LVX  Dispo: floor care    Patient discussed with Dr. Daly Fonseca, DO  General Surgery PGY-1      =================================  For FIRST CALL, please contact the Colorectal Resident listed in [facility]'s directory under Colon & Rectal Surgery / [facility]  =================================

## 2024-11-26 ENCOUNTER — APPOINTMENT (OUTPATIENT)
Dept: OCCUPATIONAL THERAPY | Facility: CLINIC | Age: 49
End: 2024-11-26
Attending: STUDENT IN AN ORGANIZED HEALTH CARE EDUCATION/TRAINING PROGRAM
Payer: COMMERCIAL

## 2024-11-26 LAB
ANION GAP SERPL CALCULATED.3IONS-SCNC: 9 MMOL/L (ref 7–15)
BUN SERPL-MCNC: 7.8 MG/DL (ref 6–20)
CALCIUM SERPL-MCNC: 8.7 MG/DL (ref 8.8–10.4)
CHLORIDE SERPL-SCNC: 102 MMOL/L (ref 98–107)
CREAT SERPL-MCNC: 0.95 MG/DL (ref 0.67–1.17)
EGFRCR SERPLBLD CKD-EPI 2021: >90 ML/MIN/1.73M2
ERYTHROCYTE [DISTWIDTH] IN BLOOD BY AUTOMATED COUNT: 13.5 % (ref 10–15)
GLUCOSE SERPL-MCNC: 120 MG/DL (ref 70–99)
HCO3 SERPL-SCNC: 25 MMOL/L (ref 22–29)
HCT VFR BLD AUTO: 31.7 % (ref 40–53)
HGB BLD-MCNC: 10.4 G/DL (ref 13.3–17.7)
MAGNESIUM SERPL-MCNC: 1.9 MG/DL (ref 1.7–2.3)
MCH RBC QN AUTO: 30.6 PG (ref 26.5–33)
MCHC RBC AUTO-ENTMCNC: 32.8 G/DL (ref 31.5–36.5)
MCV RBC AUTO: 93 FL (ref 78–100)
PHOSPHATE SERPL-MCNC: 4.6 MG/DL (ref 2.5–4.5)
PLATELET # BLD AUTO: 322 10E3/UL (ref 150–450)
POTASSIUM SERPL-SCNC: 3.7 MMOL/L (ref 3.4–5.3)
RBC # BLD AUTO: 3.4 10E6/UL (ref 4.4–5.9)
SODIUM SERPL-SCNC: 136 MMOL/L (ref 135–145)
WBC # BLD AUTO: 9.9 10E3/UL (ref 4–11)

## 2024-11-26 PROCEDURE — 83735 ASSAY OF MAGNESIUM: CPT

## 2024-11-26 PROCEDURE — 82565 ASSAY OF CREATININE: CPT | Performed by: COLON & RECTAL SURGERY

## 2024-11-26 PROCEDURE — 250N000011 HC RX IP 250 OP 636: Performed by: PHYSICIAN ASSISTANT

## 2024-11-26 PROCEDURE — 80048 BASIC METABOLIC PNL TOTAL CA: CPT | Performed by: COLON & RECTAL SURGERY

## 2024-11-26 PROCEDURE — 36415 COLL VENOUS BLD VENIPUNCTURE: CPT | Performed by: COLON & RECTAL SURGERY

## 2024-11-26 PROCEDURE — 250N000011 HC RX IP 250 OP 636: Performed by: COLON & RECTAL SURGERY

## 2024-11-26 PROCEDURE — 84100 ASSAY OF PHOSPHORUS: CPT

## 2024-11-26 PROCEDURE — 94640 AIRWAY INHALATION TREATMENT: CPT | Mod: 76

## 2024-11-26 PROCEDURE — 97535 SELF CARE MNGMENT TRAINING: CPT | Mod: GO | Performed by: OCCUPATIONAL THERAPIST

## 2024-11-26 PROCEDURE — G0463 HOSPITAL OUTPT CLINIC VISIT: HCPCS

## 2024-11-26 PROCEDURE — 250N000013 HC RX MED GY IP 250 OP 250 PS 637: Performed by: PHYSICIAN ASSISTANT

## 2024-11-26 PROCEDURE — 120N000002 HC R&B MED SURG/OB UMMC

## 2024-11-26 PROCEDURE — 250N000013 HC RX MED GY IP 250 OP 250 PS 637: Performed by: SURGERY

## 2024-11-26 PROCEDURE — 250N000009 HC RX 250

## 2024-11-26 PROCEDURE — 258N000003 HC RX IP 258 OP 636

## 2024-11-26 PROCEDURE — 85014 HEMATOCRIT: CPT | Performed by: COLON & RECTAL SURGERY

## 2024-11-26 PROCEDURE — 250N000013 HC RX MED GY IP 250 OP 250 PS 637

## 2024-11-26 RX ORDER — POTASSIUM CHLORIDE 750 MG/1
40 TABLET, EXTENDED RELEASE ORAL ONCE
Status: COMPLETED | OUTPATIENT
Start: 2024-11-26 | End: 2024-11-26

## 2024-11-26 RX ORDER — MAGNESIUM SULFATE HEPTAHYDRATE 40 MG/ML
2 INJECTION, SOLUTION INTRAVENOUS ONCE
Status: COMPLETED | OUTPATIENT
Start: 2024-11-26 | End: 2024-11-26

## 2024-11-26 RX ADMIN — ENOXAPARIN SODIUM 40 MG: 40 INJECTION SUBCUTANEOUS at 09:10

## 2024-11-26 RX ADMIN — IPRATROPIUM BROMIDE AND ALBUTEROL SULFATE 3 ML: .5; 3 SOLUTION RESPIRATORY (INHALATION) at 10:23

## 2024-11-26 RX ADMIN — DEXTROSE AND SODIUM CHLORIDE: 5; 900 INJECTION, SOLUTION INTRAVENOUS at 06:36

## 2024-11-26 RX ADMIN — OXYCODONE HYDROCHLORIDE 5 MG: 5 TABLET ORAL at 02:53

## 2024-11-26 RX ADMIN — OXYCODONE HYDROCHLORIDE 5 MG: 5 TABLET ORAL at 01:35

## 2024-11-26 RX ADMIN — IPRATROPIUM BROMIDE AND ALBUTEROL SULFATE 3 ML: .5; 3 SOLUTION RESPIRATORY (INHALATION) at 20:04

## 2024-11-26 RX ADMIN — ACETAMINOPHEN 1000 MG: 500 TABLET ORAL at 09:14

## 2024-11-26 RX ADMIN — OXYCODONE HYDROCHLORIDE 10 MG: 10 TABLET ORAL at 09:10

## 2024-11-26 RX ADMIN — POTASSIUM CHLORIDE 40 MEQ: 750 TABLET, EXTENDED RELEASE ORAL at 12:58

## 2024-11-26 RX ADMIN — ACETAMINOPHEN 1000 MG: 500 TABLET ORAL at 01:35

## 2024-11-26 RX ADMIN — MAGNESIUM SULFATE HEPTAHYDRATE 2 G: 2 INJECTION, SOLUTION INTRAVENOUS at 12:58

## 2024-11-26 RX ADMIN — FAMOTIDINE 20 MG: 10 INJECTION, SOLUTION INTRAVENOUS at 09:10

## 2024-11-26 RX ADMIN — OXYCODONE HYDROCHLORIDE 10 MG: 10 TABLET ORAL at 19:51

## 2024-11-26 RX ADMIN — ACETAMINOPHEN 1000 MG: 500 TABLET ORAL at 19:50

## 2024-11-26 ASSESSMENT — ACTIVITIES OF DAILY LIVING (ADL)
ADLS_ACUITY_SCORE: 40
DEPENDENT_IADLS:: INDEPENDENT
ADLS_ACUITY_SCORE: 40

## 2024-11-26 NOTE — PLAN OF CARE
Occupational Therapy Discharge Summary    Reason for therapy discharge:    All goals and outcomes met, no further needs identified.    Progress towards therapy goal(s). See goals on Care Plan in [facility] electronic health record for goal details.  Goals met    Therapy recommendation(s):    No further therapy is recommended.

## 2024-11-26 NOTE — PLAN OF CARE
Goal Outcome Evaluation:      Plan of Care Reviewed With: patient    Overall Patient Progress: no changeOverall Patient Progress: no change    Outcome Evaluation: Pt alert and oriented x4 , stable VS , complains of abdominal pain 8/10 PRN oxycodone given ,With left PIV infusing well at 50/hr, with Ileostomy with Output , Isaiah drain leaking at site, dressing change,resident acknowledge to continue changing dressing if leaking occur. Pt with abdominal binder  . continue plan of care.

## 2024-11-26 NOTE — CONSULTS
Care Management Initial Consult    General Information  Assessment completed with: Patient,    Type of CM/SW Visit: Initial Assessment  Primary Care Provider verified and updated as needed: Yes   Readmission within the last 30 days: no previous admission in last 30 days   Reason for Consult: discharge planning  Advance Care Planning, Reviewed: no concerns identified        Communication Assessment  Patient's communication style: spoken language (English or Bilingual)    Hearing Difficulty or Deaf: no   Wear Glasses or Blind: no    Cognitive  Cognitive/Neuro/Behavioral: WDL                      Living Environment:   People in home: spouse     Current living Arrangements: apartment      Able to return to prior arrangements: yes     Family/Social Support:  Care provided by: self, spouse/significant other  Provides care for: no one  Marital Status:   Support system:           Description of Support System: Supportive, Involved       Current Resources:   Patient receiving home care services: No  Community Resources: [facility]  Equipment currently used at home: cane, straight, shower chair, walker, standard  Supplies currently used at home:  new - ostomy supplies    Employment/Financial:  Employment Status: unemployed     Financial Concerns: none   Referral to Financial Worker: No  Does the patient's insurance plan have a 3 day qualifying hospital stay waiver?  No    Lifestyle & Psychosocial Needs:  Social Drivers of Health     Food Insecurity: Low Risk  (11/25/2024)    Food Insecurity     Within the past 12 months, did you worry that your food would run out before you got money to buy more?: No     Within the past 12 months, did the food you bought just not last and you didn t have money to get more?: No   Depression: Not at risk (11/4/2024)    PHQ-2     PHQ-2 Score: 1   Housing Stability: Low Risk  (11/25/2024)    Housing Stability     Do you have housing? : Yes     Are you worried about losing your  housing?: No   Tobacco Use: High Risk (11/4/2024)    Patient History     Smoking Tobacco Use: Every Day     Smokeless Tobacco Use: Never     Passive Exposure: Not on file   Financial Resource Strain: Low Risk  (11/25/2024)    Financial Resource Strain     Within the past 12 months, have you or your family members you live with been unable to get utilities (heat, electricity) when it was really needed?: No   Alcohol Use: Not on file   Transportation Needs: Low Risk  (11/25/2024)    Transportation Needs     Within the past 12 months, has lack of transportation kept you from medical appointments, getting your medicines, non-medical meetings or appointments, work, or from getting things that you need?: No   Physical Activity: Not on file   Interpersonal Safety: Low Risk  (11/22/2024)    Interpersonal Safety     Do you feel physically and emotionally safe where you currently live?: Yes     Within the past 12 months, have you been hit, slapped, kicked or otherwise physically hurt by someone?: No     Within the past 12 months, have you been humiliated or emotionally abused in other ways by your partner or ex-partner?: No   Stress: Not on file   Social Connections: Not on file   Health Literacy: Not on file     Functional Status:  Prior to admission patient needed assistance:   Dependent ADLs:: Independent, Ambulation-cane, Ambulation-walker  Dependent IADLs:: Independent     Mental Health Status:  Mental Health Status: No Current Concerns       Chemical Dependency Status:  Chemical Dependency Status: No Current Concerns           Values/Beliefs:  Spiritual, Cultural Beliefs, Roman Catholic Practices, Values that affect care: no             Discussed  Partnership in Safe Discharge Planning  document with patient/family: Yes:     Additional Information:  CMA completed at bedside, as indicated by new DME needs r/t loop ileostomy creation. Pt confirmed listed address, PCP ([facility]), and payer source (IMM no). Pt resides at noted  apartment with spouse, Feliciano; denied PTA home care services or regular OP services. DME used PTA include walker, cane, and shower chair. Pt denies any concerns over lack of needed equipment. Pt denies concerns r/t financial, Sabianism/spiritual, or BH/VERN needs. Anticipate discharge to home w/ home care referral for ileostomy support. WOC RN consult to support ostomy edu/changing needs.     [facility]  [address]; [address]  [place] [address]  Phone: [phone]  Fax: [phone]  Pending referral request -     Next Steps: RNCC to continue to follow and support safe discharge planning.       Alberto Vanessa RN BSN  7C RNCC - Phone: [phone]  Care Management Department    Secure message via [employer] (Search Name or 7C Med Surg [phone] RNCC)  For Weekend & Holiday on call RN Care Coordinator:  * [place] (0800 - 1630) Saturday and Sunday    Units: 5A, 5B, & 5C     Units: 6B, 6C, 6D     Units: 7A, 7B, 7C, 7D     Units: 6A/ICU      * [facility] ([phone]) Saturday and Sunday    Units: 6 Med/Surg, 8A, 10 ICU, & Pediatric Units    Units: 5 Ortho, 5Med/Surg & WB ED

## 2024-11-26 NOTE — PROGRESS NOTES
Colorectal Surgery Progress Note  [facility]  POD#5  Procedure(s):  Cystoscopy, Temporary Insert Stent Bilateral Ureter  ROBOT-ASSISTED, CONVERTED TO OPEN, PROCTECTOMY, DIVERTING LOOP ILEOSTOMY; repair of umbilical hernia  Sigmoidoscopy flexible  OPEN PROCTECTOMY, DIVERTING LOOP ILEOSTOMY; repair of umbilical hernia     Subjective: feeling better.  Tolerating oral intake.  Some abdominal cramping though but pain better overall.  Ambulating.   Received bolus yesterday.     Vitals:  Vitals:    11/25/24 1142 11/25/24 1421 11/25/24 2137 11/26/24 0633   BP:  (!) 141/80 134/70 135/81   BP Location:  Left arm Left arm Left arm   Patient Position:       Cuff Size:       Pulse:  67 75 59   Resp:  16 18 18   Temp:  98.7  F (37.1  C) 98.4  F (36.9  C) 97.5  F (36.4  C)   TempSrc:  Oral Oral Oral   SpO2:  96% 96% 96%   Weight: 101.5 kg (223 lb 11.2 oz)      Height:         I/O:  I/O last 3 completed shifts:  In: 934 [P.O.:930; I.V.:4]  Out: 4255 [Urine:3175; Drains:80; Stool:1000]    Physical Exam:  Gen: AAOx3, NAD  Pulm: Non-labored breathing  Abd: Soft, mildly distended, appropriately tender, no guarding. Abdominal binder in place   Incision C/D/I with staples in place, vessel loop   Ileostomy viable with some gas and liquid stool in bag.     MOHINDER drain serosanguineous drainage   Ext:  Warm and well-perfused    BMP  Recent Labs   Lab 11/26/24  0647 11/25/24  0555 11/24/24  0625 11/23/24  0557    129* 131* 131*   POTASSIUM 3.7 4.0 4.4 4.5   CHLORIDE 102 95* 94* 96*   CO2 25 27 25 28   BUN 7.8 11.3 8.4 13.6   CR 0.95 1.24* 0.75 1.00   * 101* 129* 129*   MAG 1.9 2.1 2.1 2.4*   PHOS 4.6* 4.0 4.0 3.5     CBC  Recent Labs   Lab 11/26/24  0647 11/25/24  0555 11/24/24  0625 11/23/24  0557   WBC 9.9 10.3 15.1* 14.8*   HGB 10.4* 10.0* 11.3* 10.8*   HCT 31.7* 31.2* 34.1* 32.9*    304 319 303         ASSESSMENT: This is a 49 year old male rectosigmoid cancer and diverticulitis s/p robotic  converted to open LAR and diverting loop ileostomy, repair of umbilical hernia on 11/21. Medical issues include HLD, tobacco use, obesity, low back pain, cannabis use and alcohol abuse     Today:   - stop IVF today if creat stable  - oxycodone 5/10mg  - Regular diet  - will remove MOHINDER Drain today  - needs WOCN and lovenox ppx injection teachings for possible discharge tomorrow    Neuro/Pain:   #Acute Post op pain  - tylenol  - PRN oxycodone    CV:  #HLD  - normotensive  - continue to monitor    PULM: encourage IS and ambulation  - wean O2 as tolerated  - Duoneb Bid    GI/FEN:   #GERD  - Regular Diet  - PRN tums    :   - voiding  #VICKI  - Cr 1.24 now to 0.95    #Hyponatremia  - 129 now 136    Heme:  -Acute blood loss anemia: Monitor hemoglobin, transfuse for hemoglobin less than 7  - Hgb 10.0    ID:   #Leukocytosis, resolved   - WBC 10.3 to 9.9    Endocrine:   #Obesity, BMI 35  - Glucose:     Lines: PIV  Activity: as tolerated  Ppx: LVX  Dispo: Possible discharge tomorrow 11/27 with new ileostomy and Lovenox ppx injections x30 days.  Appreciate care management consult for possible HHC as able for new ileo and lovenox injections.     Laurence Servin PA-C ..................11/26/2024   10:41 AM  Colon and Rectal Surgery    Patient was seen and discussed with Dr. Riley    The above plan of care was performed and communicated to me by Dr. Kauffman.    I spent 25 minute face-to-face or coordinating care of Bella Quintero. Over 50% of our time on the unit was spent counseling the patient and/or coordinating care as documented in the assessment and plan.     11000 post op hospital visit    =================================  For FIRST CALL, please contact the Colorectal Resident listed in [facility]'s directory under Colon & Rectal Surgery / [facility]  =================================

## 2024-11-26 NOTE — DISCHARGE INSTRUCTIONS
[facility]     Name: Bella Quintero  Date: 11/26/24    To order your ostomy supplies    The ostomy Supplier needs this supply list  to process your order. You will need to fax/deliver this list, along with your Insurance information. Your home care nurse can assist with this process.    List of Ostomy Distributors      [facility]  Ph. [phone] Fax # [phone]  [facility] [employer]   Ph. [phone] [phone]  [employer]   Ph. [phone]  [facility]   Ph. [phone] [phone]  Or Call your insurance provider for their preferred supplier    Your Medical Supplier will need your surgeon's name, phone and fax number    Clinic:                     Phone                            Fax  Colorectal Surgery:    [phone]    Verbal Order for ostomy supplies for 1 Month per:   Dary Balbuena, RN, CWOCN                                                                                                     Authorizing MD: Frandy Ledezma    Quantity of pouches:  20   /mo.    Request the following supplies:      [place]    2 piece flat wafer 57mm  # 30005- no tape or #12163 with tape border                         Fecal pouch 57mm- # 90443 transparent or #26788- opaque        or         Coloplast  1 piece convex light pouch cut up to 1    without filter #13355      Accessories  2  barrier ring #8524     Adapt powder #7906    No sting film barrier # 4390                          Adapt odor eliminator and lubricant 236ml bottle # 19435     Adapt M-9 Spray room deodorizer #0419                           Brava elastic barrier strips curved # 545721                        [place] belt large #1927 (29-49 )                           [place] barrier extenders #12768                         Small convex rings #29744               Change your pouch 2-3 times a week, more often if leaking.   If you are cutting a hole in the wafer  of your pouch, recheck stoma size and adjust pouch opening as needed every week    . Call the Ostomy Nurse at [facility]       [address] [place] [address], [place] : [phone]   Schedule a follow-up visit in 2 to 4 weeks after your surgery, sooner if having problems Bring a complete set of pouch-changing supplies to this visit    [facility] outpatient [facility] department  [address] [place] [address]   number- [phone]     Problems you should Report  - The stoma turns blue or darker in color.  - Cuts or sores around the stoma.  - Red, raw or painful skin around the stoma.  - Any bulging of the skin around the stoma.  - A pouch that leaks every day.  - Problems making the right size hole in the pouch wafer.   Please call with any questions or concerns.   _________________________________________________________________________    Outpatient Wound Ostomy Nurse    [facility]  [address] [place] [address]  [facility]:  Phone: [phone]    A message was left on 11- for wound ostomy nurse to call you and set up an outpatient appointment.   If you do not receive a call then contact the clinic at [phone] and ask for the wound ostomy nurse to schedule the appointment. (during business hours during the week)  ___________________________________________________________________

## 2024-11-26 NOTE — PROGRESS NOTES
[facility]  WO Nurse Inpatient Assessment     Consulted for: new ileostomy    Patient History (according to provider note(s):      49 year old male  with Malignant neoplasm of rectosigmoid junction and diverticulitis with PMH: HLD, tobacco use, obesity, low back pain, cannabis use and alcohol abuse     Assessment:      Areas visualized during today's visit: Focused: and Abdomen    Assessment of new loop Ileostomy:  Diagnosis Pertinent to Stoma: Diverticulitis      Surgery Date: 11/22/24  Surgeon:Frandy Kauffman       Hospital: [facility]  Pouching system in place on assessment today: Conchis two piece, cut to fit, and flat   Pouch barrier status: Minimal melting   Pouch last changed/wear time: 1 day  Reason for pouch change today: ostomy education  Effectiveness of current pouching/ supply plan: Recommend continuing current plan  Change made with ostomy management today: No  Pouching system placed today: Conchis two piece and cut to fit with barrier ring  Supplies: gathered and discussed with patient      Last photo: 11/22/24  Stoma location: RLQ  Stoma size: 1 1/8 x 1 3/4 inches,   Stoma appearance: red, moist, good protrusion but empties at skin level at 8 o'clock  Mucocutaneous junction:  intact  Peristomal complication(s): none - except incision at 9 o'clock (see pic above)  Output: gas, thin paste consistency, and brown  Output volume emptied during visit: 100ml  Abdominal assessment: Soft  Surgical site(s): open to air  NG still in place? No  Pain: Sharp, Dull ache, and Fullness  Is patient still on a PCA? No    Ostomy education assessment:  Participant of teaching session today: patient  and spouse  Education completed today: Pouching system assessment , Refitting of appliance , Adjustment of pouching plan, Pouch change return demonstration, Ostomy accessory product use , Peristomal skin care, Pouch emptying return demonstration, When to seek medical attention, Low  "fiber diet , Odor/flatus management , Infection prevention/hygiene , Hernia prevention, Lifestyle adjustments , and Discharge instructions  Educational materials/methods: Verbal, Demonstration, Return demonstration, and FOD [place] education hand out  Education still needed: Pouch change return demonstration with home care  Learning Comprehension:   Psychosocial assessment: lives with his spouse who is willing to assist with cares.  Also has a friend who may be willing to assist-he lives in the pt's apartment complex  Patient readiness for education today: observing, attentive, and active participation  Following today's visit: patient and spouse are able to demonstrate;         1. How to empty their pouch? Yes and Independent        2. How to change their pouch? Yes and with minimal assist        3. How to read and record intake and output correctly? Yes  Preparation for discharge completed: Placed prescription recommendations in discharge navigator for MD to sign, Ensured patient has extra supplies for discharge, Discussed how to order supplies after discharge , Ordered samples from  after gaining consent from patient/caregiver, Discussed how and when to make an outpatient WOC nurse appointment after discharge, Prepared for discharge home with home care, and Discuss signs/symptoms of when to seek medical attention  Preparation for discharge still needed: none   WO recommend home care? Yes and By WOC RN if possible  Face to face time: 60 minutes     Treatment Plan:     RLQ Ileostomy pouching plan:   Pouching system: ostomy supplies pouches: [place] 57 FECAL (161159) ostomy supplies barrier: Conchis 57mm FLAT (498308)  Accessories used: WOC ostomy accessories: 2\" Cera Barrier Ring (939789)   Frequency of pouch changes: PRN leakage and Three times a week  WOC follow up plan: Daily Monday-Friday (as able)  Bedside RN interventions: Change pouch PRN if leaking using the supplies above, Empty pouch " when 1/3 to 1/2 full, ensure to clean pouch outlet after emptying to prevent odor, Notify [facility] for ongoing pouch leakage, and Document stoma appearance and output volume, color, and consistency every shift     Outpatient Plan  Quantity of pouches:  20   /mo.    Request the following supplies:      Conchis    2 piece flat wafer 57mm  # 93417- no tape or #42829 with tape border                         Fecal pouch 57mm- # 49864 transparent or #41117- opaque        or         Coloplast  1 piece convex light pouch cut up to 1    without filter #41498      Accessories  2  barrier ring #8805     Adapt powder #7906    No sting film barrier # 3345                          Adapt odor eliminator and lubricant 236ml bottle # 84382     Adapt M-9 Spray room deodorizer #7781                           Brava elastic barrier strips curved # 967832                        Conchis belt large #3614 (29-49 )                           Conchis barrier extenders #86129                         Small convex rings #54448               Change your pouch 2-3 times a week, more often if leaking.    DATA:     Current support surface: Standard  Other: [place] [facility]    BMI: Body mass index is 35.04 kg/m .   Active diet order: Orders Placed This Encounter      Regular Diet Adult     Output: I/O last 3 completed shifts:  In: 934 [P.O.:930; I.V.:4]  Out: 4255 [Urine:3175; Drains:80; Stool:1000]     Labs:   Recent Labs   Lab 11/26/24  0647 11/22/24  0542 11/21/24  0725   ALBUMIN  --   --  3.7   PREALB  --   --  21.1   HGB 10.4*   < > 14.0   WBC 9.9   < > 14.2*    < > = values in this interval not displayed.     Pressure injury risk assessment:   Sensory Perception: 4-->no impairment  Moisture: 4-->rarely moist  Activity: 3-->walks occasionally  Mobility: 4-->no limitation  Nutrition: 2-->probably inadequate  Friction and Shear: 3-->no apparent problem  Anatoliy Score: 20  Pager no longer is use, please contact through [employer] group: [facility]  Dept. Office Number: [phone]    ]

## 2024-11-26 NOTE — PROGRESS NOTES
"/71 (BP Location: Left arm)   Pulse 65   Temp 99.2  F (37.3  C) (Oral)   Resp 16   Ht 1.702 m (5' 7\")   Wt 101.5 kg (223 lb 11.2 oz)   SpO2 96%   BMI 35.04 kg/m      Pain well controlled with scheduled tylenol and prn oxycodone. Up walking in halls independently and steady on his feet. Discussed technique of administering subcutaneous lovenox, his   will be giving it and is familiar with giving shots. New ostomy has soft stool present, ~ 50ml. Magnesium given IV, potassium PO. MOHINDER drain removed. Plan for discharge home tomorrow.    "

## 2024-11-26 NOTE — PLAN OF CARE
Problem: Adult Inpatient Plan of Care  Goal: Plan of Care Review  Description: The Plan of Care Review/Shift note should be completed every shift.  The Outcome Evaluation is a brief statement about your assessment that the patient is improving, declining, or no change.  This information will be displayed automatically on your shift  note.  Flowsheets (Taken 11/26/2024 9797)  Outcome Evaluation:   Ongoing POC   CMA completed for new DME support and home care needs   Pt makes needs known   new ileostomy teaching w/ WOC and Bedside staff   Anticipate d/c to home   RNCC to continue to follow  Plan of Care Reviewed With:   patient   significant other  Overall Patient Progress: improving

## 2024-11-27 ENCOUNTER — APPOINTMENT (OUTPATIENT)
Dept: PHYSICAL THERAPY | Facility: CLINIC | Age: 49
End: 2024-11-27
Attending: STUDENT IN AN ORGANIZED HEALTH CARE EDUCATION/TRAINING PROGRAM
Payer: COMMERCIAL

## 2024-11-27 VITALS
WEIGHT: 223.7 LBS | DIASTOLIC BLOOD PRESSURE: 75 MMHG | OXYGEN SATURATION: 95 % | BODY MASS INDEX: 35.11 KG/M2 | HEIGHT: 67 IN | SYSTOLIC BLOOD PRESSURE: 143 MMHG | TEMPERATURE: 98 F | RESPIRATION RATE: 18 BRPM | HEART RATE: 69 BPM

## 2024-11-27 LAB
ANION GAP SERPL CALCULATED.3IONS-SCNC: 9 MMOL/L (ref 7–15)
BUN SERPL-MCNC: 6.5 MG/DL (ref 6–20)
CALCIUM SERPL-MCNC: 8.5 MG/DL (ref 8.8–10.4)
CHLORIDE SERPL-SCNC: 99 MMOL/L (ref 98–107)
CREAT SERPL-MCNC: 0.86 MG/DL (ref 0.67–1.17)
EGFRCR SERPLBLD CKD-EPI 2021: >90 ML/MIN/1.73M2
ERYTHROCYTE [DISTWIDTH] IN BLOOD BY AUTOMATED COUNT: 13.8 % (ref 10–15)
GLUCOSE SERPL-MCNC: 102 MG/DL (ref 70–99)
HCO3 SERPL-SCNC: 26 MMOL/L (ref 22–29)
HCT VFR BLD AUTO: 31.3 % (ref 40–53)
HGB BLD-MCNC: 10.4 G/DL (ref 13.3–17.7)
MAGNESIUM SERPL-MCNC: 2 MG/DL (ref 1.7–2.3)
MCH RBC QN AUTO: 30.8 PG (ref 26.5–33)
MCHC RBC AUTO-ENTMCNC: 33.2 G/DL (ref 31.5–36.5)
MCV RBC AUTO: 93 FL (ref 78–100)
PHOSPHATE SERPL-MCNC: 4 MG/DL (ref 2.5–4.5)
PLATELET # BLD AUTO: 362 10E3/UL (ref 150–450)
POTASSIUM SERPL-SCNC: 4 MMOL/L (ref 3.4–5.3)
RBC # BLD AUTO: 3.38 10E6/UL (ref 4.4–5.9)
SODIUM SERPL-SCNC: 134 MMOL/L (ref 135–145)
WBC # BLD AUTO: 10.8 10E3/UL (ref 4–11)

## 2024-11-27 PROCEDURE — 250N000011 HC RX IP 250 OP 636: Performed by: COLON & RECTAL SURGERY

## 2024-11-27 PROCEDURE — 36415 COLL VENOUS BLD VENIPUNCTURE: CPT | Performed by: COLON & RECTAL SURGERY

## 2024-11-27 PROCEDURE — 83735 ASSAY OF MAGNESIUM: CPT

## 2024-11-27 PROCEDURE — 84100 ASSAY OF PHOSPHORUS: CPT

## 2024-11-27 PROCEDURE — 250N000013 HC RX MED GY IP 250 OP 250 PS 637: Performed by: SURGERY

## 2024-11-27 PROCEDURE — 82374 ASSAY BLOOD CARBON DIOXIDE: CPT | Performed by: COLON & RECTAL SURGERY

## 2024-11-27 PROCEDURE — 97530 THERAPEUTIC ACTIVITIES: CPT | Mod: GP

## 2024-11-27 PROCEDURE — 85041 AUTOMATED RBC COUNT: CPT | Performed by: COLON & RECTAL SURGERY

## 2024-11-27 PROCEDURE — 80048 BASIC METABOLIC PNL TOTAL CA: CPT | Performed by: COLON & RECTAL SURGERY

## 2024-11-27 PROCEDURE — 250N000013 HC RX MED GY IP 250 OP 250 PS 637

## 2024-11-27 RX ORDER — ACETAMINOPHEN 500 MG
1000 TABLET ORAL EVERY 6 HOURS
Qty: 100 TABLET | Refills: 0 | Status: SHIPPED | OUTPATIENT
Start: 2024-11-27

## 2024-11-27 RX ORDER — ENOXAPARIN SODIUM 100 MG/ML
40 INJECTION SUBCUTANEOUS EVERY 24 HOURS
Qty: 12 ML | Refills: 0 | Status: SHIPPED | OUTPATIENT
Start: 2024-11-27 | End: 2024-12-27

## 2024-11-27 RX ORDER — OXYCODONE HYDROCHLORIDE 5 MG/1
5-10 TABLET ORAL EVERY 6 HOURS PRN
Qty: 30 TABLET | Refills: 0 | Status: SHIPPED | OUTPATIENT
Start: 2024-11-27

## 2024-11-27 RX ADMIN — OXYCODONE HYDROCHLORIDE 10 MG: 10 TABLET ORAL at 11:39

## 2024-11-27 RX ADMIN — ACETAMINOPHEN 1000 MG: 500 TABLET ORAL at 13:54

## 2024-11-27 RX ADMIN — OXYCODONE HYDROCHLORIDE 5 MG: 5 TABLET ORAL at 05:56

## 2024-11-27 RX ADMIN — ENOXAPARIN SODIUM 40 MG: 40 INJECTION SUBCUTANEOUS at 10:34

## 2024-11-27 RX ADMIN — ACETAMINOPHEN 1000 MG: 500 TABLET ORAL at 01:49

## 2024-11-27 RX ADMIN — OXYCODONE HYDROCHLORIDE 10 MG: 10 TABLET ORAL at 01:49

## 2024-11-27 RX ADMIN — ACETAMINOPHEN 1000 MG: 500 TABLET ORAL at 08:35

## 2024-11-27 ASSESSMENT — ACTIVITIES OF DAILY LIVING (ADL)
ADLS_ACUITY_SCORE: 40
ADLS_ACUITY_SCORE: 39
ADLS_ACUITY_SCORE: 40

## 2024-11-27 NOTE — PLAN OF CARE
Discharge to: Home  Transportation: Ride with    Time: 1515  Prescriptions: Reviewed with virtual nurse and picked up from discharge pharmacy   Belongings: Sent with patient    -if applicable return home meds from inpatient pharmacy: n/a  Access: PIV removed   Care plan and education discontinued: Yes  Paperwork: Reviewed with virtual nurse and sent with patient     A&Ox4. VSS on RA. Up independently. Independently taking care of ostomy. Oxy given x1 in addition to scheduled tylenol for pain. Pt discharged home this afternoon.

## 2024-11-27 NOTE — PLAN OF CARE
Physical Therapy Discharge Summary    Reason for therapy discharge:    All goals and outcomes met, no further needs identified.    Progress towards therapy goal(s). See goals on Care Plan in [facility] electronic health record for goal details.  Goals met    Therapy recommendation(s):    Continue home exercise program.

## 2024-11-27 NOTE — PROGRESS NOTES
Care Management Discharge Note    Discharge Date: 11/27/2024     Discharge Disposition:  Home    Discharge Services:  Outpt WOC to call pt & schedule appt.     Discharge DME:  [facility]. Ostomy supplies: resources given to pt by WOC Nurse.     Discharge Transportation: family or friend will provide    Private pay costs discussed: Not applicable    Does the patient's insurance plan have a 3 day qualifying hospital stay waiver?  No    PAS Confirmation Code:  NA  Patient/family educated on Medicare website which has current facility and service quality ratings:      Education Provided on the Discharge Plan:  Yes  Persons Notified of Discharge Plans:  Patient  Patient/Family in Agreement with the Plan:  Yes    Handoff Referral Completed: Yes, [facility] PCP: Internal handoff referral completed    Additional Information:  Informed by [facility] they are not able to locate a skilled home care agency; they contacted 10 home care agencies & all declined. Notified Dr Fonseca.   Pt with new ostomy. Would benefit from WOC follow-up after discharge.    Called [phone] & left a message for Faith WOC nurse at the [facility]. Requested she schedule an outpt appt WOC appt with pt.  Called the [facility] at [phone] and inquired if they could help scheduled an outpt WOC appt. They are not able to.   Information on the outpt WOC placed on the AVS.   Spoke with pt's nurse.   ________________________________________________    The following information was placed on the AVS:     Outpatient Wound Ostomy Nurse     [facility]  [address] [place] [address]  [facility]:  Phone: [phone]     A message was left on 11- for wound ostomy nurse to call you and set up an outpatient appointment.   If you do not receive a call then contact the clinic at [phone] and ask for the wound ostomy nurse to schedule the appointment.  (during business hours during the week)  _____________________________________________________        Ailin Srinivasan, RN Care Coordinator  [facility]  [facility]  On 11- RNCC coverage for Unit 7C rooms 3678-3851. Call [phone].        NATE Cuevas  [facility]  Phone:  [phone]

## 2024-11-27 NOTE — PLAN OF CARE
Goal Outcome Evaluation:      Plan of Care Reviewed With: patient    Overall Patient Progress: improvingOverall Patient Progress: improving    Outcome Evaluation: Pt still having significant pain, PRN Oxy 10mg x1 with good effect. MOHINDER drain removed; site is open and leaking. Dressing change x1. Pt changed own colostomy bag x2, also 1x assist. Apprehensive but looking forward to discharge tomorrow.

## 2024-11-27 NOTE — DISCHARGE SUMMARY
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]         Procedures:   Procedure(s):  Cystoscopy, Temporary Insert Stent Bilateral Ureter  ROBOT-ASSISTED, CONVERTED TO OPEN, PROCTECTOMY, DIVERTING LOOP ILEOSTOMY; repair of umbilical hernia  Sigmoidoscopy flexible  OPEN PROCTECTOMY, DIVERTING LOOP ILEOSTOMY; repair of umbilical hernia               Consultations:   WOUND OSTOMY CONTINENCE NURSE  IP CONSULT  OCCUPATIONAL THERAPY ADULT IP CONSULT  PHYSICAL THERAPY ADULT IP CONSULT  NURSING TO CONSULT FOR VASCULAR ACCESS CARE IP CONSULT  CARE MANAGEMENT / SOCIAL WORK IP CONSULT         Imaging Studies:     Results for orders placed or performed during the hospital encounter of 11/21/24   POC US Guidance Needle Placement    Impression    Bilateral transversus abdominus complete.    XR Abdomen 2 Views    Narrative    Exam: XR ABDOMEN 2 VIEWS, 11/24/2024 11:34 AM    Indication: Postop ileus    Comparison: 10/24/2024    Findings:   There are mildly dilated small bowel loops with air-fluid levels. No  clear transition point. Surgical drains in the left lower quadrant. No  free air or pneumatosis identified on these images.      Impression    Impression: Findings consistent with postoperative ileus including  mildly dilated loops of small bowel and multiple air-fluid levels.     MARIELOS JAY MD         SYSTEM ID:  [id number]              Medications Prior to Admission:     Medications Prior to Admission   Medication Sig Dispense Refill  Last Dose/Taking    atorvastatin (LIPITOR) 10 MG tablet Take 2 tablets (20 mg) by mouth daily 30 tablet 3     ketoconazole (NIZORAL) 2 % external shampoo Apply topically daily as needed for itching or irritation 100 mL 2     [DISCONTINUED] atorvastatin (LIPITOR) 10 MG tablet Take 1 tablet (10 mg) by mouth daily 30 tablet 3 11/23/2024    [DISCONTINUED] magnesium citrate 1.745 GM/30ML solution Drink 1 bottle at 8pm the night before surgery 296 mL 0 11/20/2024 at  8:00 PM    [DISCONTINUED] metroNIDAZOLE (FLAGYL) 500 MG tablet Take 1 tablet (500 mg) by mouth every 6 hours. At 8:00 am, 2:00 pm, 8:00 pm the day prior to your surgery with neomycin and zofran. 3 tablet 0 11/20/2024 at  8:00 PM    [DISCONTINUED] neomycin (MYCIFRADIN) 500 MG tablet Take 2 tablets (1,000 mg) by mouth every 6 hours. At 8:00 am, 2:00 pm, 8:00 pm the day prior to your surgery with flagyl and zofran. 6 tablet 0 11/20/2024 at  8:00 PM    [DISCONTINUED] ondansetron (ZOFRAN) 4 MG tablet Take 1 tablet (4 mg) by mouth every 6 hours. At 8:00 am, 2:00 pm, 8:00 pm the day prior to your surgery with neomycin and flagyl. 3 tablet 0 11/20/2024 at  8:00 PM    [DISCONTINUED] polyethylene glycol (MIRALAX) 17 GM/Dose powder Please take 238 grams mixed with 64 oz of Gatorade at 4pm the night before surgery 238 g 0 Past Week    [DISCONTINUED] polyethylene glycol (MIRALAX) 17 GM/Dose powder Take 17 g (1 Capful) by mouth daily. 510 g 2 11/20/2024    [DISCONTINUED] senna (SENOKOT) 8.6 MG tablet Take 1 tablet by mouth daily as needed for constipation. 30 tablet 0 Past Month              Discharge Medications:     Current Discharge Medication List        START taking these medications    Details   acetaminophen (TYLENOL) 500 MG tablet Take 2 tablets (1,000 mg) by mouth every 6 hours.  Qty: 100 tablet, Refills: 0    Associated Diagnoses: Acute post-operative pain      enoxaparin ANTICOAGULANT (LOVENOX) 40 MG/0.4ML syringe Inject 0.4 mLs (40 mg) subcutaneously every 24  hours.  Qty: 12 mL, Refills: 0    Associated Diagnoses: Malignant neoplasm of rectosigmoid junction (H)      oxyCODONE (ROXICODONE) 5 MG tablet Take 1-2 tablets (5-10 mg) by mouth every 6 hours as needed for moderate to severe pain (take 1 tab (5mg) for moderate pain or 2tabs (10mg) for severe pain).  Qty: 30 tablet, Refills: 0    Associated Diagnoses: Acute post-operative pain           CONTINUE these medications which have NOT CHANGED    Details   atorvastatin (LIPITOR) 10 MG tablet Take 2 tablets (20 mg) by mouth daily  Qty: 30 tablet, Refills: 3    Associated Diagnoses: Hyperlipidemia LDL goal <100      ketoconazole (NIZORAL) 2 % external shampoo Apply topically daily as needed for itching or irritation  Qty: 100 mL, Refills: 2    Associated Diagnoses: Folliculitis           STOP taking these medications       magnesium citrate 1.745 GM/30ML solution Comments:   Reason for Stopping:         metroNIDAZOLE (FLAGYL) 500 MG tablet Comments:   Reason for Stopping:         neomycin (MYCIFRADIN) 500 MG tablet Comments:   Reason for Stopping:         ondansetron (ZOFRAN) 4 MG tablet Comments:   Reason for Stopping:         polyethylene glycol (MIRALAX) 17 GM/Dose powder Comments:   Reason for Stopping:         polyethylene glycol (MIRALAX) 17 GM/Dose powder Comments:   Reason for Stopping:         senna (SENOKOT) 8.6 MG tablet Comments:   Reason for Stopping:                        Brief History of Illness:   49 year old male rectosigmoid cancer and diverticulitis s/p robotic converted to open LAR and diverting loop ileostomy, repair of umbilical hernia on 11/21. Medical issues include HLD, tobacco use, obesity, low back pain, cannabis use and alcohol abuse              Hospital Course:   Post-operatively pt was gently fluid resuscitated.  Warren was removed and pt was able to void.  Pt had eventual return of bowel function and was able to tolerate small amounts of regular diet.     Pt was seen by WOC[facility] and taught how to  "manage his/her new ostomy. Pt was seen by PT/OT and deemed appropriate for discharge home.  Pt was taught how to self-inject post-operative prophylactic lovenox for which he is to do for a total of 30 days. Post-operative pain was controlled with scheduled tylenol and prn oxycodone    MOHINDER drain and incision loop removed during hospital stay.     CM/SW tried 10 different agencies to set patient up with home care [facility] but unfortunately none was set up. So instead we set patient up for outpatient [facility]    Patient is to follow up in the [facility] and Rectal Surgery Clinic in 2-3 week with Preethi Newman NP or Klarissa Navarro PA-C and then with Dr. Kauffman in 2-3 weeks after.          Day of Discharge Physical Exam:   Blood pressure (!) 143/75, pulse 69, temperature 98  F (36.7  C), temperature source Oral, resp. rate 18, height 1.702 m (5' 7\"), weight 101.5 kg (223 lb 11.2 oz), SpO2 93%.    Gen: AAOx3, NAD  Pulm: Non-labored breathing  Abd: Soft, appropriately tender, no guarding   Incision C/D/I with    Stoma pink and viable with stool and gas in bag  Ext:  Warm and well-perfused         Final Pathology Result:   Pending at time of discharge           Discharge Instructions and Follow-Up:     No discharge procedures on file.         Home Health Care:     Not needed           Discharge Disposition:     Discharged to home      Condition at discharge: Stable      AChong Fonseca, DO  General Surgery PGY-1      Pt was seen and discussed with Dr. Riley on 11/27/2024      The above plan of care was performed and communicated to me by Dr. Kauffman     I spent ____ minute face-to-face or coordinating care of Bella Quintero. Over 50% of our time on the unit was spent counseling the patient and/or coordinating care as documented in the assessment and plan.     [address] post op hospital visit    "

## 2024-11-27 NOTE — PLAN OF CARE
Goal Outcome Evaluation:      Plan of Care Reviewed With: patient    Overall Patient Progress: no changeOverall Patient Progress: no change    Outcome Evaluation: Pt alert and oriented x4 , complains of pain oxy given , with colostomy bag with good output, old nikki drain site dressing dry and intact , possible discharge today..

## 2024-11-29 LAB
PATH REPORT.COMMENTS IMP SPEC: ABNORMAL
PATH REPORT.COMMENTS IMP SPEC: YES
PATH REPORT.FINAL DX SPEC: ABNORMAL
PATH REPORT.GROSS SPEC: ABNORMAL
PATH REPORT.INTRAOP OBS SPEC DOC: ABNORMAL
PATH REPORT.MICROSCOPIC SPEC OTHER STN: ABNORMAL
PATH REPORT.RELEVANT HX SPEC: ABNORMAL
PATHOLOGY SYNOPTIC REPORT: ABNORMAL
PHOTO IMAGE: ABNORMAL

## 2024-12-01 DIAGNOSIS — C19 MALIGNANT NEOPLASM OF RECTOSIGMOID JUNCTION (H): Primary | ICD-10-CM

## 2024-12-02 ENCOUNTER — PATIENT OUTREACH (OUTPATIENT)
Dept: CARE COORDINATION | Facility: CLINIC | Age: 49
End: 2024-12-02
Payer: COMMERCIAL

## 2024-12-02 ENCOUNTER — PATIENT OUTREACH (OUTPATIENT)
Dept: ONCOLOGY | Facility: CLINIC | Age: 49
End: 2024-12-02
Payer: COMMERCIAL

## 2024-12-02 NOTE — PROGRESS NOTES
"Clinic Care Coordination Contact  Transitions of Care Outreach  Chief Complaint   Patient presents with    Clinic Care Coordination - Post Hospital       Most Recent Admission Date: 11/21/2024   Most Recent Admission Diagnosis: Malignant neoplasm of rectosigmoid junction (H) - C19     Most Recent Discharge Date: 11/27/2024   Most Recent Discharge Diagnosis: Malignant neoplasm of rectosigmoid junction (H) - C19  Acute post-operative pain - G89.18  Ileostomy in place (H) - Z93.2  Sleep apnea, unspecified type - G47.30     Transitions of Care Assessment    Discharge Assessment  How are you doing now that you are home?: \"Seems to be going pretty good\"  How are your symptoms? (Red Flag symptoms escalate to triage hotline per guidelines): Unchanged  Do you know how to contact your clinic care team if you have future questions or changes to your health status? : Yes  Does the patient have their discharge instructions? : Yes  Does the patient have questions regarding their discharge instructions? : No  Were you started on any new medications or were there changes to any of your previous medications? : Yes  Does the patient have all of their medications?: Yes  Do you have questions regarding any of your medications? : No  Do you have all of your needed medical supplies or equipment (DME)?  (i.e. oxygen tank, CPAP, cane, etc.): Yes     CHW called patient and patient is doing fine. Patient declined CC and there are no other needs at this time.       [facility] Explained and offered Care Coordination support to eligible patients: Yes    Patient accepted? No    Follow up Plan     Discharge Follow-Up  Discharge follow up appointment scheduled in alignment with recommended follow up timeframe or Transitions of Risk Category? (Low = within 30 days; Moderate= within 14 days; High= within 7 days): Yes  Discharge Follow Up Appointment Date: 12/05/24  Discharge Follow Up Appointment Scheduled with?: Specialty Care Provider    Future " Appointments   Date Time Provider Department Center   12/5/2024  1:30 PM Josias Greenwood MD [facility]   12/17/2024  1:00 PM  [facility]   12/17/2024  1:30 PM Preethi Mahoney, RACHEL CNP [facility]   1/7/2025 11:30 AM Frandy Kauffman MD [facility]   4/3/2025  1:00 PM Silver Sow MD [facility]       Outpatient Plan as outlined on AVS reviewed with patient.    For any urgent concerns, please contact our 24 hour nurse triage line: [phone] ([phone])       Mariana SANDHU, Community Health Worker  Clinic Care Coordination  [facility]   Clinic #: [phone]  Direct #: [phone]

## 2024-12-02 NOTE — TELEPHONE ENCOUNTER
New Patient Oncology Nurse Navigator Note     Referring provider: Dr Kauffman, [facility] Colorectal Surgery    Referring Clinic/Organization: [facility]     Referred to: Medical Oncology    Requested provider (if applicable): First available - did not specify     Referral Received: 12/01/24       Evaluation for : colon adenocarcinoma     Clinical History (per Nurse review of records provided):      (See my bookmarks for pertinent records in Epic)      HX: serial CT scans show sigmoid mass, pt met w/ Dr Kauffman 10/2024 to plan cscope (11/11 + cancer) and colectomy 11/21, pT3 (vs pT4?) pN0 per path report. CT CAP w/ no distant mets.      11/21/2024 hemicolectomy by Dr Kauffman  Final Diagnosis   A. PERITONEUM, BIOPSY:  - Fragment of fibroadipose tissue with chronic inflammation and focal fat necrosis.  - Negative for carcinoma.       B. SIGMOID COLON AND UPPER RECTUM, RESECTION:  - Invasive moderately differentiated adenocarcinoma.   - Size: 9.6 cm  - Tumor invades through the muscularis propria into the perirectal tissue.   - Tumor present at site of anterior peritoneum defect  - See Comment  - Remainder of the surgical resection margins, negative for involvement.  - Distance from proximal: 31.5 cm  - Distance from distal: 2.7 cm  - Distance from mesorectal/radial: 1 cm  - Thirty lymph nodes, negative for carcinoma (0/30).  - Abscess cavity identified in the posterior aspect of the mesorectum.  - Please see the synoptic report for additional details     C. RECTUM, DISTAL MARGIN, EXCISION:  - Negative for malignancy  - Rectal wall with focal chronic inflammatory changes in the muscularis propria compatible with a fistula tract  - Five lymph nodes examined; negative for malignancy     D. ADDITIONAL MARGIN, EXCISION:  - Muscularis propria and dense fibrous tissue with chronic inflammation.   - Negative for malignancy.      E. SUPERIOR ADDITIONAL MARGIN, EXCISION:  - Fibro-adipose tissue with chronic inflammation.   -  Negative for malignancy.      F. ADDITIONAL RIGHT MARGIN, EXCISION:  - Fibromuscular tissue with chronic inflammation.   - Separate detached fragment of colonic mucosa without diagnostic abnormality.  - Negative for malignancy.      G. FINAL RIGHT MARGIN, EXCISION:  - Fibromuscular tissue with chronic inflammation.   - Negative for malignancy.      H. ANASTOMOTIC RINGS, EXCISION:  - Portion of large bowel with vascular congestion.   - Negative for dysplasia or malignancy.             Comments:   This is an appended report. These results have been appended to a previously preliminary verified report.      Electronically signed by Mela Sands MD on 11/29/2024 at 10:09 AM   Comment  [facility]   Histologic examination of the sigmoid colon and upper rectum reveals an invasive moderately differentiated adenocarcinoma extending into the muscularis propria and the perirectal tissue. All surgical resection margins are free of tumor. However, a defect is observed on the anterior right peritoneal surface, where the tumor appears to extend (noted on slide B6). Due to this defect, it is challenging to definitively rule out visceral peritoneal surface involvement or invasion (finding which would up-stage to pT4). Additionally, an abscess cavity is identified in the posterior aspect of the mesorectum, but no neoplastic cells are present at this site, nor is there any apparent connection to the main tumor.     pT Category  pT3   pN Category  pN0   .         Clinical Assessment / Barriers to Care (Per Nurse):    Pt will need to establish care with GI Med Onc for further evaluation. Will proceed to offer next available Onc, there is availability at [facility] in 3 wks.         Records Location: [facility] Everywhere     Records Needed:     N/A    Additional testing needed prior to consult:     N/A          Abner Sahni, RN, BSN, OCN  Oncology New Patient Nurse Navigator   [facility]  [phone]

## 2024-12-04 ENCOUNTER — HOSPITAL ENCOUNTER (INPATIENT)
Facility: CLINIC | Age: 49
End: 2024-12-04
Attending: EMERGENCY MEDICINE | Admitting: SURGERY
Payer: COMMERCIAL

## 2024-12-04 ENCOUNTER — APPOINTMENT (OUTPATIENT)
Dept: GENERAL RADIOLOGY | Facility: CLINIC | Age: 49
End: 2024-12-04
Attending: SURGERY
Payer: COMMERCIAL

## 2024-12-04 ENCOUNTER — APPOINTMENT (OUTPATIENT)
Dept: CT IMAGING | Facility: CLINIC | Age: 49
End: 2024-12-04
Attending: EMERGENCY MEDICINE
Payer: COMMERCIAL

## 2024-12-04 DIAGNOSIS — K56.609 SMALL BOWEL OBSTRUCTION (H): ICD-10-CM

## 2024-12-04 DIAGNOSIS — L03.311 ABDOMINAL WALL CELLULITIS: ICD-10-CM

## 2024-12-04 LAB
ALBUMIN SERPL BCG-MCNC: 4 G/DL (ref 3.5–5.2)
ALBUMIN UR-MCNC: NEGATIVE MG/DL
ALP SERPL-CCNC: 107 U/L (ref 40–150)
ALT SERPL W P-5'-P-CCNC: 26 U/L (ref 0–70)
ANION GAP SERPL CALCULATED.3IONS-SCNC: 12 MMOL/L (ref 7–15)
APPEARANCE UR: CLEAR
AST SERPL W P-5'-P-CCNC: 19 U/L (ref 0–45)
BASOPHILS # BLD AUTO: 0.1 10E3/UL (ref 0–0.2)
BASOPHILS NFR BLD AUTO: 0 %
BILIRUB SERPL-MCNC: 0.4 MG/DL
BILIRUB UR QL STRIP: NEGATIVE
BUN SERPL-MCNC: 8.3 MG/DL (ref 6–20)
CALCIUM SERPL-MCNC: 9.3 MG/DL (ref 8.8–10.4)
CHLORIDE SERPL-SCNC: 93 MMOL/L (ref 98–107)
COLOR UR AUTO: NORMAL
CREAT SERPL-MCNC: 0.77 MG/DL (ref 0.67–1.17)
EGFRCR SERPLBLD CKD-EPI 2021: >90 ML/MIN/1.73M2
EOSINOPHIL # BLD AUTO: 0.2 10E3/UL (ref 0–0.7)
EOSINOPHIL NFR BLD AUTO: 1 %
ERYTHROCYTE [DISTWIDTH] IN BLOOD BY AUTOMATED COUNT: 14 % (ref 10–15)
GLUCOSE SERPL-MCNC: 127 MG/DL (ref 70–99)
GLUCOSE UR STRIP-MCNC: NEGATIVE MG/DL
HCO3 SERPL-SCNC: 24 MMOL/L (ref 22–29)
HCT VFR BLD AUTO: 34.9 % (ref 40–53)
HGB BLD-MCNC: 11.7 G/DL (ref 13.3–17.7)
HGB UR QL STRIP: NEGATIVE
HOLD SPECIMEN: NORMAL
HOLD SPECIMEN: NORMAL
IMM GRANULOCYTES # BLD: 0.3 10E3/UL
IMM GRANULOCYTES NFR BLD: 2 %
KETONES UR STRIP-MCNC: NEGATIVE MG/DL
LACTATE SERPL-SCNC: 0.6 MMOL/L (ref 0.7–2)
LEUKOCYTE ESTERASE UR QL STRIP: NEGATIVE
LIPASE SERPL-CCNC: 24 U/L (ref 13–60)
LYMPHOCYTES # BLD AUTO: 1.5 10E3/UL (ref 0.8–5.3)
LYMPHOCYTES NFR BLD AUTO: 9 %
MAGNESIUM SERPL-MCNC: 2.1 MG/DL (ref 1.7–2.3)
MCH RBC QN AUTO: 31 PG (ref 26.5–33)
MCHC RBC AUTO-ENTMCNC: 33.5 G/DL (ref 31.5–36.5)
MCV RBC AUTO: 93 FL (ref 78–100)
MONOCYTES # BLD AUTO: 0.9 10E3/UL (ref 0–1.3)
MONOCYTES NFR BLD AUTO: 5 %
NEUTROPHILS # BLD AUTO: 14.3 10E3/UL (ref 1.6–8.3)
NEUTROPHILS NFR BLD AUTO: 83 %
NITRATE UR QL: NEGATIVE
NRBC # BLD AUTO: 0 10E3/UL
NRBC BLD AUTO-RTO: 0 /100
PH UR STRIP: 6.5 [PH] (ref 5–7)
PHOSPHATE SERPL-MCNC: 3.7 MG/DL (ref 2.5–4.5)
PLATELET # BLD AUTO: 643 10E3/UL (ref 150–450)
POTASSIUM SERPL-SCNC: 4.3 MMOL/L (ref 3.4–5.3)
PROT SERPL-MCNC: 7.1 G/DL (ref 6.4–8.3)
RBC # BLD AUTO: 3.77 10E6/UL (ref 4.4–5.9)
RBC URINE: <1 /HPF
SODIUM SERPL-SCNC: 129 MMOL/L (ref 135–145)
SP GR UR STRIP: 1 (ref 1–1.03)
UROBILINOGEN UR STRIP-MCNC: NORMAL MG/DL
WBC # BLD AUTO: 17.2 10E3/UL (ref 4–11)
WBC URINE: <1 /HPF

## 2024-12-04 PROCEDURE — 74177 CT ABD & PELVIS W/CONTRAST: CPT

## 2024-12-04 PROCEDURE — 250N000011 HC RX IP 250 OP 636: Performed by: EMERGENCY MEDICINE

## 2024-12-04 PROCEDURE — 99285 EMERGENCY DEPT VISIT HI MDM: CPT | Performed by: EMERGENCY MEDICINE

## 2024-12-04 PROCEDURE — 83690 ASSAY OF LIPASE: CPT | Performed by: EMERGENCY MEDICINE

## 2024-12-04 PROCEDURE — 74177 CT ABD & PELVIS W/CONTRAST: CPT | Mod: 26 | Performed by: RADIOLOGY

## 2024-12-04 PROCEDURE — 83605 ASSAY OF LACTIC ACID: CPT | Performed by: EMERGENCY MEDICINE

## 2024-12-04 PROCEDURE — 85014 HEMATOCRIT: CPT | Performed by: EMERGENCY MEDICINE

## 2024-12-04 PROCEDURE — 250N000011 HC RX IP 250 OP 636: Performed by: PHYSICIAN ASSISTANT

## 2024-12-04 PROCEDURE — 258N000003 HC RX IP 258 OP 636: Performed by: EMERGENCY MEDICINE

## 2024-12-04 PROCEDURE — 84100 ASSAY OF PHOSPHORUS: CPT | Performed by: PHYSICIAN ASSISTANT

## 2024-12-04 PROCEDURE — 96376 TX/PRO/DX INJ SAME DRUG ADON: CPT | Performed by: EMERGENCY MEDICINE

## 2024-12-04 PROCEDURE — 81003 URINALYSIS AUTO W/O SCOPE: CPT | Performed by: EMERGENCY MEDICINE

## 2024-12-04 PROCEDURE — 999N000065 XR ABDOMEN PORT 1 VIEW

## 2024-12-04 PROCEDURE — 85025 COMPLETE CBC W/AUTO DIFF WBC: CPT | Performed by: EMERGENCY MEDICINE

## 2024-12-04 PROCEDURE — 250N000009 HC RX 250: Performed by: EMERGENCY MEDICINE

## 2024-12-04 PROCEDURE — 80051 ELECTROLYTE PANEL: CPT | Performed by: EMERGENCY MEDICINE

## 2024-12-04 PROCEDURE — 83735 ASSAY OF MAGNESIUM: CPT | Performed by: PHYSICIAN ASSISTANT

## 2024-12-04 PROCEDURE — 85041 AUTOMATED RBC COUNT: CPT | Performed by: EMERGENCY MEDICINE

## 2024-12-04 PROCEDURE — 96375 TX/PRO/DX INJ NEW DRUG ADDON: CPT | Performed by: EMERGENCY MEDICINE

## 2024-12-04 PROCEDURE — 74018 RADEX ABDOMEN 1 VIEW: CPT | Mod: 26 | Performed by: STUDENT IN AN ORGANIZED HEALTH CARE EDUCATION/TRAINING PROGRAM

## 2024-12-04 PROCEDURE — 36415 COLL VENOUS BLD VENIPUNCTURE: CPT | Performed by: EMERGENCY MEDICINE

## 2024-12-04 PROCEDURE — 99285 EMERGENCY DEPT VISIT HI MDM: CPT | Mod: 25 | Performed by: EMERGENCY MEDICINE

## 2024-12-04 PROCEDURE — 96374 THER/PROPH/DIAG INJ IV PUSH: CPT | Mod: 59 | Performed by: EMERGENCY MEDICINE

## 2024-12-04 PROCEDURE — 258N000003 HC RX IP 258 OP 636: Performed by: PHYSICIAN ASSISTANT

## 2024-12-04 PROCEDURE — 80053 COMPREHEN METABOLIC PANEL: CPT | Performed by: EMERGENCY MEDICINE

## 2024-12-04 RX ORDER — ENOXAPARIN SODIUM 100 MG/ML
40 INJECTION SUBCUTANEOUS EVERY 24 HOURS
Status: DISPENSED | OUTPATIENT
Start: 2024-12-05

## 2024-12-04 RX ORDER — HYDROMORPHONE HCL IN WATER/PF 6 MG/30 ML
0.2 PATIENT CONTROLLED ANALGESIA SYRINGE INTRAVENOUS
Status: DISPENSED | OUTPATIENT
Start: 2024-12-04

## 2024-12-04 RX ORDER — ATORVASTATIN CALCIUM 40 MG/1
40 TABLET, FILM COATED ORAL DAILY
COMMUNITY
Start: 2024-07-16

## 2024-12-04 RX ORDER — HYDROMORPHONE HYDROCHLORIDE 1 MG/ML
0.3 INJECTION, SOLUTION INTRAMUSCULAR; INTRAVENOUS; SUBCUTANEOUS
Status: DISPENSED | OUTPATIENT
Start: 2024-12-04

## 2024-12-04 RX ORDER — HYDROMORPHONE HYDROCHLORIDE 1 MG/ML
0.3 INJECTION, SOLUTION INTRAMUSCULAR; INTRAVENOUS; SUBCUTANEOUS ONCE
Status: COMPLETED | OUTPATIENT
Start: 2024-12-04 | End: 2024-12-04

## 2024-12-04 RX ORDER — LIDOCAINE HYDROCHLORIDE 20 MG/ML
10 SOLUTION OROPHARYNGEAL ONCE
Status: COMPLETED | OUTPATIENT
Start: 2024-12-04 | End: 2024-12-04

## 2024-12-04 RX ORDER — METRONIDAZOLE 500 MG/100ML
500 INJECTION, SOLUTION INTRAVENOUS EVERY 8 HOURS SCHEDULED
Status: DISPENSED | OUTPATIENT
Start: 2024-12-04 | End: 2024-12-08

## 2024-12-04 RX ORDER — HYDROMORPHONE HYDROCHLORIDE 1 MG/ML
0.5 INJECTION, SOLUTION INTRAMUSCULAR; INTRAVENOUS; SUBCUTANEOUS
Status: COMPLETED | OUTPATIENT
Start: 2024-12-04 | End: 2024-12-04

## 2024-12-04 RX ORDER — ONDANSETRON 2 MG/ML
4 INJECTION INTRAMUSCULAR; INTRAVENOUS EVERY 30 MIN PRN
Status: DISCONTINUED | OUTPATIENT
Start: 2024-12-04 | End: 2024-12-04

## 2024-12-04 RX ORDER — ONDANSETRON 4 MG/1
4 TABLET, ORALLY DISINTEGRATING ORAL EVERY 6 HOURS PRN
Status: ACTIVE | OUTPATIENT
Start: 2024-12-04

## 2024-12-04 RX ORDER — IOPAMIDOL 755 MG/ML
135 INJECTION, SOLUTION INTRAVASCULAR ONCE
Status: COMPLETED | OUTPATIENT
Start: 2024-12-04 | End: 2024-12-04

## 2024-12-04 RX ORDER — ENOXAPARIN SODIUM 100 MG/ML
30 INJECTION SUBCUTANEOUS EVERY 12 HOURS
Status: DISCONTINUED | OUTPATIENT
Start: 2024-12-04 | End: 2024-12-04

## 2024-12-04 RX ORDER — SODIUM CHLORIDE, SODIUM LACTATE, POTASSIUM CHLORIDE, CALCIUM CHLORIDE 600; 310; 30; 20 MG/100ML; MG/100ML; MG/100ML; MG/100ML
1000 INJECTION, SOLUTION INTRAVENOUS CONTINUOUS
Status: ACTIVE | OUTPATIENT
Start: 2024-12-04

## 2024-12-04 RX ORDER — ONDANSETRON 2 MG/ML
4 INJECTION INTRAMUSCULAR; INTRAVENOUS EVERY 6 HOURS PRN
Status: ACTIVE | OUTPATIENT
Start: 2024-12-04

## 2024-12-04 RX ORDER — CEFAZOLIN SODIUM 1 G/3ML
1 INJECTION, POWDER, FOR SOLUTION INTRAMUSCULAR; INTRAVENOUS EVERY 8 HOURS SCHEDULED
Status: DISPENSED | OUTPATIENT
Start: 2024-12-04 | End: 2024-12-08

## 2024-12-04 RX ORDER — HYDROMORPHONE HYDROCHLORIDE 1 MG/ML
0.5 INJECTION, SOLUTION INTRAMUSCULAR; INTRAVENOUS; SUBCUTANEOUS EVERY 30 MIN PRN
Status: DISCONTINUED | OUTPATIENT
Start: 2024-12-04 | End: 2024-12-04

## 2024-12-04 RX ADMIN — HYDROMORPHONE HYDROCHLORIDE 0.2 MG: 0.2 INJECTION, SOLUTION INTRAMUSCULAR; INTRAVENOUS; SUBCUTANEOUS at 21:26

## 2024-12-04 RX ADMIN — HYDROMORPHONE HYDROCHLORIDE 0.5 MG: 1 INJECTION, SOLUTION INTRAMUSCULAR; INTRAVENOUS; SUBCUTANEOUS at 04:58

## 2024-12-04 RX ADMIN — HYDROMORPHONE HYDROCHLORIDE 0.2 MG: 0.2 INJECTION, SOLUTION INTRAMUSCULAR; INTRAVENOUS; SUBCUTANEOUS at 19:42

## 2024-12-04 RX ADMIN — HYDROMORPHONE HYDROCHLORIDE 0.2 MG: 0.2 INJECTION, SOLUTION INTRAMUSCULAR; INTRAVENOUS; SUBCUTANEOUS at 11:31

## 2024-12-04 RX ADMIN — HYDROMORPHONE HYDROCHLORIDE 0.3 MG: 1 INJECTION, SOLUTION INTRAMUSCULAR; INTRAVENOUS; SUBCUTANEOUS at 15:11

## 2024-12-04 RX ADMIN — SODIUM CHLORIDE, POTASSIUM CHLORIDE, SODIUM LACTATE AND CALCIUM CHLORIDE 1000 ML: 600; 310; 30; 20 INJECTION, SOLUTION INTRAVENOUS at 14:42

## 2024-12-04 RX ADMIN — ONDANSETRON 4 MG: 2 INJECTION INTRAMUSCULAR; INTRAVENOUS at 04:58

## 2024-12-04 RX ADMIN — METRONIDAZOLE 500 MG: 500 INJECTION, SOLUTION INTRAVENOUS at 12:58

## 2024-12-04 RX ADMIN — HYDROMORPHONE HYDROCHLORIDE 0.5 MG: 1 INJECTION, SOLUTION INTRAMUSCULAR; INTRAVENOUS; SUBCUTANEOUS at 07:31

## 2024-12-04 RX ADMIN — HYDROMORPHONE HYDROCHLORIDE 0.3 MG: 1 INJECTION, SOLUTION INTRAMUSCULAR; INTRAVENOUS; SUBCUTANEOUS at 17:17

## 2024-12-04 RX ADMIN — CEFAZOLIN 1 G: 1 INJECTION, POWDER, FOR SOLUTION INTRAMUSCULAR; INTRAVENOUS at 21:28

## 2024-12-04 RX ADMIN — LIDOCAINE HYDROCHLORIDE 10 ML: 20 SOLUTION OROPHARYNGEAL at 10:52

## 2024-12-04 RX ADMIN — HYDROMORPHONE HYDROCHLORIDE 0.5 MG: 1 INJECTION, SOLUTION INTRAMUSCULAR; INTRAVENOUS; SUBCUTANEOUS at 09:14

## 2024-12-04 RX ADMIN — HYDROMORPHONE HYDROCHLORIDE 0.3 MG: 1 INJECTION, SOLUTION INTRAMUSCULAR; INTRAVENOUS; SUBCUTANEOUS at 12:05

## 2024-12-04 RX ADMIN — CEFAZOLIN 1 G: 1 INJECTION, POWDER, FOR SOLUTION INTRAMUSCULAR; INTRAVENOUS at 12:59

## 2024-12-04 RX ADMIN — SODIUM CHLORIDE 1000 ML: 9 INJECTION, SOLUTION INTRAVENOUS at 09:14

## 2024-12-04 RX ADMIN — ENOXAPARIN SODIUM 30 MG: 30 INJECTION SUBCUTANEOUS at 11:31

## 2024-12-04 RX ADMIN — IOPAMIDOL 135 ML: 755 INJECTION, SOLUTION INTRAVENOUS at 06:11

## 2024-12-04 RX ADMIN — METRONIDAZOLE 500 MG: 500 INJECTION, SOLUTION INTRAVENOUS at 22:10

## 2024-12-04 RX ADMIN — SODIUM CHLORIDE, POTASSIUM CHLORIDE, SODIUM LACTATE AND CALCIUM CHLORIDE 1000 ML: 600; 310; 30; 20 INJECTION, SOLUTION INTRAVENOUS at 10:52

## 2024-12-04 ASSESSMENT — ACTIVITIES OF DAILY LIVING (ADL)
ADLS_ACUITY_SCORE: 61
ADLS_ACUITY_SCORE: 61
ADLS_ACUITY_SCORE: 59
ADLS_ACUITY_SCORE: 61
ADLS_ACUITY_SCORE: 59
ADLS_ACUITY_SCORE: 61
ADLS_ACUITY_SCORE: 59
ADLS_ACUITY_SCORE: 61
ADLS_ACUITY_SCORE: 61

## 2024-12-04 ASSESSMENT — COLUMBIA-SUICIDE SEVERITY RATING SCALE - C-SSRS
2. HAVE YOU ACTUALLY HAD ANY THOUGHTS OF KILLING YOURSELF IN THE PAST MONTH?: NO
1. IN THE PAST MONTH, HAVE YOU WISHED YOU WERE DEAD OR WISHED YOU COULD GO TO SLEEP AND NOT WAKE UP?: NO
6. HAVE YOU EVER DONE ANYTHING, STARTED TO DO ANYTHING, OR PREPARED TO DO ANYTHING TO END YOUR LIFE?: NO

## 2024-12-04 NOTE — ED PROVIDER NOTES
ED Provider Note  [facility]      History     Chief Complaint   Patient presents with    Abdominal Pain    Ostomy     HPI  Williamsn Ted Quintero is a 49 year old male with history of rectosigmoid cancer presents to the ED for evaluation of abdominal pain.  He was recently [facility] underwent end ileostomy and discharged on 11/27/2024.  The past 2 days he has noticed redness around his surgical scar as well as decreased output from the ostomy.  He has significant pain/distention throughout the abdomen.    Past Medical History  Past Medical History:   Diagnosis Date    Bilateral low back pain with right-sided sciatica     Diverticulitis of colon 05/01/2024    History of alcohol use disorder     HLD (hyperlipidemia)     Obesity     Tobacco use disorder      Past Surgical History:   Procedure Laterality Date    COLONOSCOPY N/A 11/11/2024    Procedure: COLONOSCOPY, WITH POLYPECTOMY AND BIOPSY;  Surgeon: Ranjan Verdin MD;  Location:  GI    DAVINCI ASSISTED RESECTION LOW ANTERIOR N/A 11/21/2024    Procedure: ROBOT-ASSISTED, CONVERTED TO OPEN, PROCTECTOMY, DIVERTING LOOP ILEOSTOMY; repair of umbilical hernia;  Surgeon: Frandy Kauffman MD;  Location: [facility] OR    INSERT STENT URETER Bilateral 11/21/2024    Procedure: Cystoscopy, Temporary Insert Stent Bilateral Ureter;  Surgeon: Xavier Art MD;  Location: [facility] OR    RESECTION ABDOMINAL PERINEAL N/A 11/21/2024    Procedure: OPEN PROCTECTOMY, DIVERTING LOOP ILEOSTOMY; repair of umbilical hernia;  Surgeon: Frandy Kauffman MD;  Location: [facility] OR    SIGMOIDOSCOPY FLEXIBLE N/A 11/21/2024    Procedure: Sigmoidoscopy flexible;  Surgeon: Frandy Kauffman MD;  Location: [facility] OR     acetaminophen (TYLENOL) 500 MG tablet  atorvastatin (LIPITOR) 40 MG tablet  enoxaparin ANTICOAGULANT (LOVENOX) 40 MG/0.4ML syringe  ketoconazole (NIZORAL) 2 % external shampoo  oxyCODONE (ROXICODONE) 5 MG tablet      Allergies   Allergen Reactions    Codeine Swelling  and Itching     PN: LW Reaction: EDEMA, GENERALIZED   Tolerated hydromorphone 5/1/24    Penicillins Other (See Comments)     Swelling, burning feeling in hands and feet. , PN: LW Reaction: EDEMA, GENERALIZED     Family History  Family History   Problem Relation Age of Onset    Colon Polyps Mother     Alcoholism Father     Diabetes Father     Colon Cancer No family hx of     Anesthesia Reaction No family hx of     Deep Vein Thrombosis (DVT) No family hx of     Clotting Disorder No family hx of      Social History   Social History     Tobacco Use    Smoking status: Every Day     Current packs/day: 1.00     Average packs/day: 1 pack/day for 30.4 years (30.4 ttl pk-yrs)     Types: Cigarettes     Start date: 7/9/1994    Smokeless tobacco: Never    Tobacco comments:     Revisit as pt is willing   Substance Use Topics    Alcohol use: Not Currently     Alcohol/week: 15.0 standard drinks of alcohol     Types: 15 Glasses of wine per week     Comment: 11/4/24. Minimal drinking currently. 3 bottles of wine per week. H/o 1L EtOH every other day, self decreased several years ago.    Drug use: Yes     Types: Marijuana     Comment: Daily      A medically appropriate review of systems was performed with pertinent positives and negatives noted in the HPI, and all other systems negative.    Physical Exam   BP: 133/73  Pulse: 89  Temp: 98.2  F (36.8  C)  Resp: 18  SpO2: 95 %  Physical Exam  Vitals and nursing note reviewed.   Constitutional:       General: He is not in acute distress.     Appearance: Normal appearance.   HENT:      Head: Normocephalic.      Nose: Nose normal.   Eyes:      Pupils: Pupils are equal, round, and reactive to light.   Cardiovascular:      Rate and Rhythm: Normal rate and regular rhythm.   Pulmonary:      Effort: Pulmonary effort is normal.   Abdominal:      General: There is distension.      Palpations: Abdomen is soft.      Tenderness: There is abdominal tenderness.      Comments: No output from ostomy      Musculoskeletal:         General: No deformity. Normal range of motion.      Cervical back: Normal range of motion.   Skin:     General: Skin is warm.   Neurological:      Mental Status: He is alert and oriented to person, place, and time.   Psychiatric:         Mood and Affect: Mood normal.           ED Course, Procedures, & Data        IV Antibiotics given and/or elevated Lactate of 0.6 and no sepsis note found - Delete this reminder and enter the sepsis note or '.edcms' before signing chart.>>>     Results for orders placed or performed during the hospital encounter of 12/04/24   CT Abdomen Pelvis w Contrast     Status: None    Narrative    EXAM: CT ABDOMEN PELVIS W CONTRAST  LOCATION: [facility]  DATE: 12/4/2024    INDICATION: diffuse abd pain, recent iliostomy, now w  dec output, likely abd wall cellulitis around midline incision  COMPARISON: None.  TECHNIQUE: CT scan of the abdomen and pelvis was performed following injection of IV contrast. Multiplanar reformats were obtained. Dose reduction techniques were used.  CONTRAST: iopamidol (ISOVUE 370) solution 135 mL    FINDINGS:   LOWER CHEST: Normal.    HEPATOBILIARY: Normal.    PANCREAS: Normal.    SPLEEN: Normal.    ADRENAL GLANDS: Normal.    KIDNEYS/BLADDER: Normal.    BOWEL: Postoperative changes of partial sigmoid resection and right lower quadrant ileostomy formation. A few tiny locules of postoperative gas in the fat near the sigmoid staple line with recent surgery. Multiple loops of moderately dilated proximal and   mid small bowel containing gas and fluid in the lumen. Abrupt transition to decompressed ileum in the anterior right abdomen subjacent to the ventral peritoneal lining (images 250-272 of axial series 4). Small amount of postoperative free fluid. No   abscess.    LYMPH NODES: Normal.    VASCULATURE: Mild aortoiliac atherosclerosis.    PELVIC ORGANS: Normal.    MUSCULOSKELETAL: Advanced  degenerative disc disease at L5-S1.      Impression    IMPRESSION:   1.  Small bowel obstruction with transition point in the anterior right abdomen.  2.  Postoperative changes of partial sigmoid resection with right lower quadrant ileostomy.   XR Abdomen Port 1 View     Status: None    Narrative    EXAMINATION:  XR ABDOMEN PORT 1 VIEW 12/4/2024     COMPARISON: Correlation with same day CT abdomen and pelvis.    HISTORY: Please confirm NG tube placement.    FINDINGS:   Single frontal semiupright view of the abdomen. Interval placement of  enteric tube with tip and sidehole projecting at the stomach.  Redemonstration of linear distended loops of small bowel. Air  distended stomach. No pneumatosis or portal venous gas. No  pneumoperitoneum, noting radiography is suboptimally sensitive for  such detection.   The lung bases are unremarkable.       Impression    IMPRESSION:   Enteric tube terminates at the stomach.    I have personally reviewed the examination and initial interpretation  and I agree with the findings.    COLLEEN WATTERS DO         SYSTEM ID:  [id number]   Comprehensive metabolic panel     Status: Abnormal   Result Value Ref Range    Sodium 129 (L) 135 - 145 mmol/L    Potassium 4.3 3.4 - 5.3 mmol/L    Carbon Dioxide (CO2) 24 22 - 29 mmol/L    Anion Gap 12 7 - 15 mmol/L    Urea Nitrogen 8.3 6.0 - 20.0 mg/dL    Creatinine 0.77 0.67 - 1.17 mg/dL    GFR Estimate >90 >60 mL/min/1.73m2    Calcium 9.3 8.8 - 10.4 mg/dL    Chloride 93 (L) 98 - 107 mmol/L    Glucose 127 (H) 70 - 99 mg/dL    Alkaline Phosphatase 107 40 - 150 U/L    AST 19 0 - 45 U/L    ALT 26 0 - 70 U/L    Protein Total 7.1 6.4 - 8.3 g/dL    Albumin 4.0 3.5 - 5.2 g/dL    Bilirubin Total 0.4 <=1.2 mg/dL   Lipase     Status: Normal   Result Value Ref Range    Lipase 24 13 - 60 U/L   Lactic acid whole blood with 1x repeat in 2 hr when >2     Status: Abnormal   Result Value Ref Range    Lactic Acid, Initial 0.6 (L) 0.7 - 2.0 mmol/L   UA with Microscopic  reflex to Culture     Status: Normal    Specimen: Urine, Clean Catch   Result Value Ref Range    Color Urine Straw Colorless, Straw, Light Yellow, Yellow    Appearance Urine Clear Clear    Glucose Urine Negative Negative mg/dL    Bilirubin Urine Negative Negative    Ketones Urine Negative Negative mg/dL    Specific Gravity Urine 1.005 1.003 - 1.035    Blood Urine Negative Negative    pH Urine 6.5 5.0 - 7.0    Protein Albumin Urine Negative Negative mg/dL    Urobilinogen Urine Normal Normal, 2.0 mg/dL    Nitrite Urine Negative Negative    Leukocyte Esterase Urine Negative Negative    RBC Urine <1 <=2 /HPF    WBC Urine <1 <=5 /HPF    Narrative    Urine Culture not indicated   CBC with platelets and differential     Status: Abnormal   Result Value Ref Range    WBC Count 17.2 (H) 4.0 - 11.0 10e3/uL    RBC Count 3.77 (L) 4.40 - 5.90 10e6/uL    Hemoglobin 11.7 (L) 13.3 - 17.7 g/dL    Hematocrit 34.9 (L) 40.0 - 53.0 %    MCV 93 78 - 100 fL    MCH 31.0 26.5 - 33.0 pg    MCHC 33.5 31.5 - 36.5 g/dL    RDW 14.0 10.0 - 15.0 %    Platelet Count 643 (H) 150 - 450 10e3/uL    % Neutrophils 83 %    % Lymphocytes 9 %    % Monocytes 5 %    % Eosinophils 1 %    % Basophils 0 %    % Immature Granulocytes 2 %    NRBCs per 100 WBC 0 <1 /100    Absolute Neutrophils 14.3 (H) 1.6 - 8.3 10e3/uL    Absolute Lymphocytes 1.5 0.8 - 5.3 10e3/uL    Absolute Monocytes 0.9 0.0 - 1.3 10e3/uL    Absolute Eosinophils 0.2 0.0 - 0.7 10e3/uL    Absolute Basophils 0.1 0.0 - 0.2 10e3/uL    Absolute Immature Granulocytes 0.3 <=0.4 10e3/uL    Absolute NRBCs 0.0 10e3/uL   Extra Tube     Status: None    Narrative    The following orders were created for panel order Extra Tube.  Procedure                               Abnormality         Status                     ---------                               -----------         ------                     Extra Blue Top Tube[[id number]]                              Final result               Extra Red Top  Tube[[id number]]                               Final result                 Please view results for these tests on the individual orders.   Extra Blue Top Tube     Status: None   Result Value Ref Range    Hold Specimen JIC    Extra Red Top Tube     Status: None   Result Value Ref Range    Hold Specimen JIC    Magnesium     Status: Normal   Result Value Ref Range    Magnesium 2.1 1.7 - 2.3 mg/dL   Phosphorus     Status: Normal   Result Value Ref Range    Phosphorus 3.7 2.5 - 4.5 mg/dL   CBC with platelets differential     Status: Abnormal    Narrative    The following orders were created for panel order CBC with platelets differential.  Procedure                               Abnormality         Status                     ---------                               -----------         ------                     CBC with platelets and d...[[id number]]  Abnormal            Final result                 Please view results for these tests on the individual orders.     Medications   lactated ringers infusion 1,000 mL ( Intravenous Restarted 12/4/24 2133)   HYDROmorphone (DILAUDID) injection 0.2 mg (0.2 mg Intravenous Not Given 12/5/24 0011)     Or   HYDROmorphone (PF) (DILAUDID) injection 0.3 mg ( Intravenous See Alternative 12/5/24 0011)   ondansetron (ZOFRAN ODT) ODT tab 4 mg (has no administration in time range)     Or   ondansetron (ZOFRAN) injection 4 mg (has no administration in time range)   ceFAZolin (ANCEF) 1 g vial to attach to  ml bag for ADULT or 50 ml bag for PEDS (0 g Intravenous Stopped 12/4/24 2325)   metroNIDAZOLE (FLAGYL) infusion 500 mg (0 mg Intravenous Stopped 12/4/24 2325)   enoxaparin ANTICOAGULANT (LOVENOX) injection 40 mg (has no administration in time range)   HYDROmorphone (PF) (DILAUDID) injection 0.5 mg (0.5 mg Intravenous $Given 12/4/24 4611)   iopamidol (ISOVUE-370) solution 135 mL (135 mLs Intravenous $Given 12/4/24 0611)   sodium chloride (PF) 0.9% PF flush 84 mL (84 mLs Intravenous $Given  12/4/24 0611)   sodium chloride 0.9% BOLUS 1,000 mL (0 mLs Intravenous Stopped 12/4/24 1052)   lidocaine (viscous) (XYLOCAINE) 2 % solution 10 mL (10 mLs Mouth/Throat $Given 12/4/24 1052)   HYDROmorphone (PF) (DILAUDID) injection 0.3 mg (0.3 mg Intravenous $Given 12/4/24 1205)     Labs Ordered and Resulted from Time of ED Arrival to Time of ED Departure   COMPREHENSIVE METABOLIC PANEL - Abnormal       Result Value    Sodium 129 (*)     Potassium 4.3      Carbon Dioxide (CO2) 24      Anion Gap 12      Urea Nitrogen 8.3      Creatinine 0.77      GFR Estimate >90      Calcium 9.3      Chloride 93 (*)     Glucose 127 (*)     Alkaline Phosphatase 107      AST 19      ALT 26      Protein Total 7.1      Albumin 4.0      Bilirubin Total 0.4     LACTIC ACID WHOLE BLOOD WITH 1X REPEAT IN 2 HR WHEN >2 - Abnormal    Lactic Acid, Initial 0.6 (*)    CBC WITH PLATELETS AND DIFFERENTIAL - Abnormal    WBC Count 17.2 (*)     RBC Count 3.77 (*)     Hemoglobin 11.7 (*)     Hematocrit 34.9 (*)     MCV 93      MCH 31.0      MCHC 33.5      RDW 14.0      Platelet Count 643 (*)     % Neutrophils 83      % Lymphocytes 9      % Monocytes 5      % Eosinophils 1      % Basophils 0      % Immature Granulocytes 2      NRBCs per 100 WBC 0      Absolute Neutrophils 14.3 (*)     Absolute Lymphocytes 1.5      Absolute Monocytes 0.9      Absolute Eosinophils 0.2      Absolute Basophils 0.1      Absolute Immature Granulocytes 0.3      Absolute NRBCs 0.0     LIPASE - Normal    Lipase 24     ROUTINE UA WITH MICROSCOPIC REFLEX TO CULTURE - Normal    Color Urine Straw      Appearance Urine Clear      Glucose Urine Negative      Bilirubin Urine Negative      Ketones Urine Negative      Specific Gravity Urine 1.005      Blood Urine Negative      pH Urine 6.5      Protein Albumin Urine Negative      Urobilinogen Urine Normal      Nitrite Urine Negative      Leukocyte Esterase Urine Negative      RBC Urine <1      WBC Urine <1     MAGNESIUM - Normal     Magnesium 2.1     PHOSPHORUS - Normal    Phosphorus 3.7       XR Abdomen Port 1 View   Final Result   IMPRESSION:    Enteric tube terminates at the stomach.      I have personally reviewed the examination and initial interpretation   and I agree with the findings.      COLLEENLULA MALONEJANENEDO GOLDEN            SYSTEM ID:  [id number]      CT Abdomen Pelvis w Contrast   Final Result   IMPRESSION:    1.  Small bowel obstruction with transition point in the anterior right abdomen.   2.  Postoperative changes of partial sigmoid resection with right lower quadrant ileostomy.             Critical care was not performed.     Medical Decision Making  The patient's presentation was of moderate complexity (an acute illness with systemic symptoms).    The patient's evaluation involved:  ordering and/or review of 3+ test(s) in this encounter (see separate area of note for details)  discussion of management or test interpretation with another health professional (see separate area of note for details)    The patient's management necessitated high risk (a parenteral controlled substance) and high risk (a decision regarding hospitalization).    Assessment & Plan    On arrival, patient appears uncomfortable due to his level pain.  There is no output from the ostomy.  His abdomen is diffusely tender and distended.  There is erythema overlying the surgical site concerning for cellulitis.  Patient was started on IV vancomycin.  There is a white count of 17.1 as well.  Colorectal surgery is consulted, the recommendations are pending.  Metabolic panel and CT abdomen pelvis are pending as well.    Today: CT scan shows small bowel obstruction, currently waiting surgery recommendations.  Signed out to morning provider.    I have reviewed the nursing notes. I have reviewed the findings, diagnosis, plan and need for follow up with the patient.    New Prescriptions    No medications on file       Final diagnoses:   Small bowel obstruction (H)   Abdominal wall  cellulitis       Maurizio Gar DO  [facility] EMERGENCY DEPARTMENT  12/4/2024     Maurizio Gar DO  12/05/24 0155

## 2024-12-04 NOTE — CONSULTS
Colon and Rectal Surgery Consultation Note  Formerly [facility]    Bella Quintero MRN# [id number]   Age: 49 year old YOB: 1975     Date of Admission:  12/4/2024    Reason for consult: SBO       Requesting physician:        Level of consult: Consult and follow for daily recommendations           Assessment:   49 year old male with rectosigmoid cancer and diverticulitis s/p robotic converted to open LAR and diverting loop ileostomy with repair of umbilical hernia on 11/21/2024.  Pt was discharged on 11/27/2024.  Pt presents with decreased ileostomy output with increased abdominal pain and distension on 12/4/2024.    CT scan 12/4/2024:   SBO with transition point to decompressed ileum in the anterior right abdomen adjacent to the ventral peritoneal lining.           Recommendations:     - admit to CRS with Dr. Kauffman  - NPO with NGT to LIS   - fluid resuscitate  - replete electrolytes  - strict in and outs  - ambulate  - will remove abdominal staples and open incision due to erythema.  Will need to be packed.   - may require Abx (has a penicillin allergy)  - will have low threshold to initiate IV nutrition in the next 24-48 hours.   - verbally discussed above plans with ED attending          History of Present Illness:   CC: abdominal pain and decreased ostomy output    Pt reports yesterday around 4pm, there was decreased ileostomy output and gas.  Then developed increasing distension, abdominal pain and cramping, heartburn like sensation.  Denies nausea, no emesis.  Some hiccups.  Was out a various stores in the morning and did not eat nor drink much.  Subsequently had about a half a plain cheeseburger and then began to not feel well after that.  Has been rubbing and massaging his abdomen a lot especially around and near his stoma due to the cramping.  Pt reports that  did note that pt's incision seemed a little more pink/red with the last ostomy appliance change.  Pt reports  maybe it feels a little bit more irritated but hard to tell as it is at his pant/waist line and does get irritated by clothing.      Pt reports has been averaging 700-1000mL of ileo output daily.  Generally has been eating well.  Has been trying to wean off protein shakes.  Has been weaning down on oxycodone use.  Pt urinating fine.  Denies dark colored urine.  Although currently feels dehydrated.             Past Medical History:     Past Medical History:   Diagnosis Date    Bilateral low back pain with right-sided sciatica     Diverticulitis of colon 05/01/2024    History of alcohol use disorder     HLD (hyperlipidemia)     Obesity     Tobacco use disorder              Past Surgical History:     Past Surgical History:   Procedure Laterality Date    COLONOSCOPY N/A 11/11/2024    Procedure: COLONOSCOPY, WITH POLYPECTOMY AND BIOPSY;  Surgeon: Ranjan Verdin MD;  Location: [facility]    DAVINCI ASSISTED RESECTION LOW ANTERIOR N/A 11/21/2024    Procedure: ROBOT-ASSISTED, CONVERTED TO OPEN, PROCTECTOMY, DIVERTING LOOP ILEOSTOMY; repair of umbilical hernia;  Surgeon: Frandy Kauffman MD;  Location: [facility] OR    INSERT STENT URETER Bilateral 11/21/2024    Procedure: Cystoscopy, Temporary Insert Stent Bilateral Ureter;  Surgeon: Xavier Art MD;  Location: [facility] OR    RESECTION ABDOMINAL PERINEAL N/A 11/21/2024    Procedure: OPEN PROCTECTOMY, DIVERTING LOOP ILEOSTOMY; repair of umbilical hernia;  Surgeon: Frandy Kauffman MD;  Location: [facility] OR    SIGMOIDOSCOPY FLEXIBLE N/A 11/21/2024    Procedure: Sigmoidoscopy flexible;  Surgeon: Frandy Kauffman MD;  Location: [facility] OR             Social History:     Social History     Socioeconomic History    Marital status:      Spouse name: Not on file    Number of children: Not on file    Years of education: Not on file    Highest education level: Not on file   Occupational History    Not on file   Tobacco Use    Smoking status: Every Day     Current packs/day: 1.00     Average  packs/day: 1 pack/day for 30.4 years (30.4 ttl pk-yrs)     Types: Cigarettes     Start date: 7/9/1994    Smokeless tobacco: Never    Tobacco comments:     Revisit as pt is willing   Substance and Sexual Activity    Alcohol use: Not Currently     Alcohol/week: 15.0 standard drinks of alcohol     Types: 15 Glasses of wine per week     Comment: 11/4/24. Minimal drinking currently. 3 bottles of wine per week. H/o 1L EtOH every other day, self decreased several years ago.    Drug use: Yes     Types: Marijuana     Comment: Daily    Sexual activity: Not on file   Other Topics Concern    Not on file   Social History Narrative    Not on file     Social Drivers of Health     Financial Resource Strain: Low Risk  (11/25/2024)    Financial Resource Strain     Within the past 12 months, have you or your family members you live with been unable to get utilities (heat, electricity) when it was really needed?: No   Food Insecurity: Low Risk  (11/25/2024)    Food Insecurity     Within the past 12 months, did you worry that your food would run out before you got money to buy more?: No     Within the past 12 months, did the food you bought just not last and you didn t have money to get more?: No   Transportation Needs: Low Risk  (11/25/2024)    Transportation Needs     Within the past 12 months, has lack of transportation kept you from medical appointments, getting your medicines, non-medical meetings or appointments, work, or from getting things that you need?: No   Physical Activity: Not on file   Stress: Not on file   Social Connections: Not on file   Interpersonal Safety: Low Risk  (11/22/2024)    Interpersonal Safety     Do you feel physically and emotionally safe where you currently live?: Yes     Within the past 12 months, have you been hit, slapped, kicked or otherwise physically hurt by someone?: No     Within the past 12 months, have you been humiliated or emotionally abused in other ways by your partner or ex-partner?: No    Housing Stability: Low Risk  (11/25/2024)    Housing Stability     Do you have housing? : Yes     Are you worried about losing your housing?: No             Family History:     Family History   Problem Relation Age of Onset    Colon Polyps Mother     Alcoholism Father     Diabetes Father     Colon Cancer No family hx of     Anesthesia Reaction No family hx of     Deep Vein Thrombosis (DVT) No family hx of     Clotting Disorder No family hx of              Allergies:      Allergies   Allergen Reactions    Codeine Swelling and Itching     PN: LW Reaction: EDEMA, GENERALIZED   Tolerated hydromorphone 5/1/24    Penicillins Other (See Comments)     Swelling, burning feeling in hands and feet. , PN: LW Reaction: EDEMA, GENERALIZED             Medications:     Current Facility-Administered Medications   Medication Dose Route Frequency Provider Last Rate Last Admin    ondansetron (ZOFRAN) injection 4 mg  4 mg Intravenous Q30 Min PRN Maurizio Gar DO   4 mg at 12/04/24 0458     Current Outpatient Medications   Medication Sig Dispense Refill    acetaminophen (TYLENOL) 500 MG tablet Take 2 tablets (1,000 mg) by mouth every 6 hours. 100 tablet 0    atorvastatin (LIPITOR) 10 MG tablet Take 2 tablets (20 mg) by mouth daily 30 tablet 3    enoxaparin ANTICOAGULANT (LOVENOX) 40 MG/0.4ML syringe Inject 0.4 mLs (40 mg) subcutaneously every 24 hours. 12 mL 0    ketoconazole (NIZORAL) 2 % external shampoo Apply topically daily as needed for itching or irritation 100 mL 2    oxyCODONE (ROXICODONE) 5 MG tablet Take 1-2 tablets (5-10 mg) by mouth every 6 hours as needed for moderate to severe pain (take 1 tab (5mg) for moderate pain or 2tabs (10mg) for severe pain). 30 tablet 0             Review of Systems:      All other review of systems negative, except for what is mentioned above        Physical Exam:   /83 (BP Location: Left arm, Patient Position: Semi-Davila's, Cuff Size: Adult Regular)   Pulse 71   Temp 98.2  F  (36.8  C) (Oral)   Resp 18   SpO2 97%   General: Alert, interactive, NAD  Resp: CTAB, normal resp effort and speaking  Cardiac: RRR, NS1,S2  Abdomen: Soft, moderately distended, minimally tender to palpation. No rebound or guarding.  Stoma pink and viable with no output in bag  Incision with staples in place with generalized blanching erythema along incision especially at the level of the stoma  Extremities: No LE edema or obvious joint abnormalities  Skin: Warm and dry, no jaundice or rash  Neuro: A&Ox3, CN 2-12 intact, VIK Servin PA-C ..................12/4/2024   7:21 AM  Colon and Rectal Surgery    Pt seen and discussed with Dr. Kauffman    The above plan of care was performed and communicated to me by Dr. Kauffman    I spent 60 minute face-to-face or coordinating care of Bella Quintero. Over 50% of our time on the unit was spent counseling the patient and/or coordinating care as documented in the assessment and plan.

## 2024-12-04 NOTE — ED NOTES
I took signout on the patient from Dr. Gar.  This is a 49-year-old male with abdominal wall cellulitis and small bowel obstruction. See initial note for full details. Patient was signed out to me pending Colorectal Surgery recommendations.  They recommend NG tube, bolus, and admission to their service.  We will admit to Colorectal Surgery.     Jose Reese, DO  12/04/24 0897

## 2024-12-04 NOTE — ED TRIAGE NOTES
Pt had ileostomy placed about 1.5 weeks ago, yesterday afternoon noticed pt stopped producing and stopped passing gas.  Pt drank miralax with no movements.   Pt states he is now experiencing widespread abdominal pain and bloating now.

## 2024-12-05 VITALS
HEART RATE: 73 BPM | OXYGEN SATURATION: 97 % | RESPIRATION RATE: 18 BRPM | DIASTOLIC BLOOD PRESSURE: 76 MMHG | TEMPERATURE: 98.7 F | SYSTOLIC BLOOD PRESSURE: 110 MMHG

## 2024-12-05 LAB
ANION GAP SERPL CALCULATED.3IONS-SCNC: 12 MMOL/L (ref 7–15)
BUN SERPL-MCNC: 8.8 MG/DL (ref 6–20)
CALCIUM SERPL-MCNC: 8.9 MG/DL (ref 8.8–10.4)
CHLORIDE SERPL-SCNC: 95 MMOL/L (ref 98–107)
CREAT SERPL-MCNC: 0.84 MG/DL (ref 0.67–1.17)
EGFRCR SERPLBLD CKD-EPI 2021: >90 ML/MIN/1.73M2
ERYTHROCYTE [DISTWIDTH] IN BLOOD BY AUTOMATED COUNT: 14 % (ref 10–15)
GLUCOSE SERPL-MCNC: 113 MG/DL (ref 70–99)
HCO3 SERPL-SCNC: 29 MMOL/L (ref 22–29)
HCT VFR BLD AUTO: 35.2 % (ref 40–53)
HGB BLD-MCNC: 11.6 G/DL (ref 13.3–17.7)
MAGNESIUM SERPL-MCNC: 2.2 MG/DL (ref 1.7–2.3)
MCH RBC QN AUTO: 30.6 PG (ref 26.5–33)
MCHC RBC AUTO-ENTMCNC: 33 G/DL (ref 31.5–36.5)
MCV RBC AUTO: 93 FL (ref 78–100)
PHOSPHATE SERPL-MCNC: 3.9 MG/DL (ref 2.5–4.5)
PLATELET # BLD AUTO: 607 10E3/UL (ref 150–450)
POTASSIUM SERPL-SCNC: 3.9 MMOL/L (ref 3.4–5.3)
RBC # BLD AUTO: 3.79 10E6/UL (ref 4.4–5.9)
SODIUM SERPL-SCNC: 136 MMOL/L (ref 135–145)
WBC # BLD AUTO: 13.1 10E3/UL (ref 4–11)

## 2024-12-05 PROCEDURE — 80048 BASIC METABOLIC PNL TOTAL CA: CPT | Performed by: PHYSICIAN ASSISTANT

## 2024-12-05 PROCEDURE — 250N000011 HC RX IP 250 OP 636: Mod: JZ

## 2024-12-05 PROCEDURE — 83735 ASSAY OF MAGNESIUM: CPT | Performed by: PHYSICIAN ASSISTANT

## 2024-12-05 PROCEDURE — 85014 HEMATOCRIT: CPT | Performed by: PHYSICIAN ASSISTANT

## 2024-12-05 PROCEDURE — 258N000003 HC RX IP 258 OP 636: Performed by: PHYSICIAN ASSISTANT

## 2024-12-05 PROCEDURE — 36415 COLL VENOUS BLD VENIPUNCTURE: CPT | Performed by: PHYSICIAN ASSISTANT

## 2024-12-05 PROCEDURE — 250N000009 HC RX 250: Performed by: PHYSICIAN ASSISTANT

## 2024-12-05 PROCEDURE — 250N000011 HC RX IP 250 OP 636: Performed by: PHYSICIAN ASSISTANT

## 2024-12-05 PROCEDURE — 84100 ASSAY OF PHOSPHORUS: CPT | Performed by: PHYSICIAN ASSISTANT

## 2024-12-05 RX ADMIN — HYDROMORPHONE HYDROCHLORIDE 0.2 MG: 0.2 INJECTION, SOLUTION INTRAMUSCULAR; INTRAVENOUS; SUBCUTANEOUS at 02:08

## 2024-12-05 RX ADMIN — HYDROMORPHONE HYDROCHLORIDE 0.2 MG: 0.2 INJECTION, SOLUTION INTRAMUSCULAR; INTRAVENOUS; SUBCUTANEOUS at 22:53

## 2024-12-05 RX ADMIN — HYDROMORPHONE HYDROCHLORIDE 0.2 MG: 0.2 INJECTION, SOLUTION INTRAMUSCULAR; INTRAVENOUS; SUBCUTANEOUS at 18:36

## 2024-12-05 RX ADMIN — PANTOPRAZOLE SODIUM 40 MG: 40 INJECTION, POWDER, FOR SOLUTION INTRAVENOUS at 20:34

## 2024-12-05 RX ADMIN — SODIUM CHLORIDE, POTASSIUM CHLORIDE, SODIUM LACTATE AND CALCIUM CHLORIDE 1000 ML: 600; 310; 30; 20 INJECTION, SOLUTION INTRAVENOUS at 02:09

## 2024-12-05 RX ADMIN — METRONIDAZOLE 500 MG: 500 INJECTION, SOLUTION INTRAVENOUS at 09:16

## 2024-12-05 RX ADMIN — SODIUM CHLORIDE, POTASSIUM CHLORIDE, SODIUM LACTATE AND CALCIUM CHLORIDE 1000 ML: 600; 310; 30; 20 INJECTION, SOLUTION INTRAVENOUS at 16:00

## 2024-12-05 RX ADMIN — HYDROMORPHONE HYDROCHLORIDE 0.2 MG: 0.2 INJECTION, SOLUTION INTRAMUSCULAR; INTRAVENOUS; SUBCUTANEOUS at 12:06

## 2024-12-05 RX ADMIN — ENOXAPARIN SODIUM 40 MG: 40 INJECTION SUBCUTANEOUS at 12:02

## 2024-12-05 RX ADMIN — METRONIDAZOLE 500 MG: 500 INJECTION, SOLUTION INTRAVENOUS at 17:25

## 2024-12-05 RX ADMIN — CEFAZOLIN 1 G: 1 INJECTION, POWDER, FOR SOLUTION INTRAMUSCULAR; INTRAVENOUS at 15:59

## 2024-12-05 RX ADMIN — CEFAZOLIN 1 G: 1 INJECTION, POWDER, FOR SOLUTION INTRAMUSCULAR; INTRAVENOUS at 08:12

## 2024-12-05 RX ADMIN — PANTOPRAZOLE SODIUM 40 MG: 40 INJECTION, POWDER, FOR SOLUTION INTRAVENOUS at 09:11

## 2024-12-05 RX ADMIN — METRONIDAZOLE 500 MG: 500 INJECTION, SOLUTION INTRAVENOUS at 22:36

## 2024-12-05 RX ADMIN — CEFAZOLIN 1 G: 1 INJECTION, POWDER, FOR SOLUTION INTRAMUSCULAR; INTRAVENOUS at 21:48

## 2024-12-05 ASSESSMENT — ACTIVITIES OF DAILY LIVING (ADL)
ADLS_ACUITY_SCORE: 61
ADLS_ACUITY_SCORE: 61
ADLS_ACUITY_SCORE: 59
ADLS_ACUITY_SCORE: 61
ADLS_ACUITY_SCORE: 59
ADLS_ACUITY_SCORE: 61
ADLS_ACUITY_SCORE: 61
ADLS_ACUITY_SCORE: 59
ADLS_ACUITY_SCORE: 61
ADLS_ACUITY_SCORE: 59
ADLS_ACUITY_SCORE: 59
ADLS_ACUITY_SCORE: 61
ADLS_ACUITY_SCORE: 59
ADLS_ACUITY_SCORE: 61
ADLS_ACUITY_SCORE: 59
ADLS_ACUITY_SCORE: 61
ADLS_ACUITY_SCORE: 59
ADLS_ACUITY_SCORE: 61
ADLS_ACUITY_SCORE: 59

## 2024-12-05 NOTE — PROVIDER NOTIFICATION
Pt had small amount of loose stool via rectum, writer inform Laurence PARKER for the Colorectal team. He has a new ileostomy from last month.

## 2024-12-05 NOTE — MEDICATION SCRIBE - ADMISSION MEDICATION HISTORY
Medication Scribe Admission Medication History    Admission medication history is complete. The information provided in this note is only as accurate as the sources available at the time of the update.    Information Source(s): Patient via in-person    Pertinent Information: Spoke with patient in person and completed medication history.    Pt stated that he is taking Atorvastatin 20 Mg tab PO daily. He takes half a pill of 40 mg tab.     Changes made to PTA medication list:  Added: Atorvastatin 40 mg tab  Deleted: Atorvastatin 10 mg tab  Changed: None    Allergies reviewed with patient and updates made in EHR: yes    Medication History Completed By: Desi Xavier 12/4/2024 9:10 PM    PTA Med List   Medication Sig Last Dose/Taking    acetaminophen (TYLENOL) 500 MG tablet Take 2 tablets (1,000 mg) by mouth every 6 hours. 12/3/2024 Morning    atorvastatin (LIPITOR) 40 MG tablet Take 40 mg by mouth daily. Take half a tab daily 12/3/2024 Morning    enoxaparin ANTICOAGULANT (LOVENOX) 40 MG/0.4ML syringe Inject 0.4 mLs (40 mg) subcutaneously every 24 hours. 12/3/2024 Morning    ketoconazole (NIZORAL) 2 % external shampoo Apply topically daily as needed for itching or irritation Past Month    oxyCODONE (ROXICODONE) 5 MG tablet Take 1-2 tablets (5-10 mg) by mouth every 6 hours as needed for moderate to severe pain (take 1 tab (5mg) for moderate pain or 2tabs (10mg) for severe pain). 12/3/2024 Morning

## 2024-12-05 NOTE — PLAN OF CARE
Goal Outcome Evaluation:    /75   Pulse 84   Temp 98.2  F (36.8  C) (Oral)   Resp 14   SpO2 94%       [id number]    Reason for admission:Abd pain.  Neuro: A&Ox4.   Cardiac: SR. VSS.   Respiratory: Sating 94% on RA.  GI/: Adequate urine output. BM X1 (via rectum and ostomy), provider informed.  Diet/appetite: NPO.  Activity: Independent up to chair and in halls.  Pain: At acceptable level on current regimen.   Skin: No new deficits noted.  LDA's:PIV and ileostomy.  Plan: Continue with POC. Notify primary team with changes.

## 2024-12-05 NOTE — PROGRESS NOTES
Colorectal Surgery Progress Note  [facility]      Subjective:  Patient reports he feels better today. Denies any nausea/vomiting with NGT and even before NGT was placed yesterday. Pain is better. Reports more output from ostomy this morning.     Vitals:  Vitals:    12/04/24 1500 12/04/24 1700 12/04/24 1938 12/05/24 0205   BP:  (!) 142/87 130/83 137/78   BP Location:       Patient Position:       Cuff Size:       Pulse:  84     Resp:  18     Temp:  98.3  F (36.8  C) 97.7  F (36.5  C) 98  F (36.7  C)   TempSrc:  Oral Oral Oral   SpO2: 96% 98% 93% 94%     I/O:  I/O last 3 completed shifts:  In: -   Out: 2450 [Urine:800; Emesis/NG output:1650]    Physical Exam:  Gen: AAOx3, NAD  Pulm: Non-labored breathing  Abd: Soft, non-distended, appropriately tender, no guarding. Erythema around the midline incision is better.    Incision C/D/I with staples in place, opened midline some 12/4 with some drainage. Dressing change   Stoma pink and viable with stool and gas in bag  Ext:  Warm and well-perfused    BMP  Recent Labs   Lab 12/04/24  0458   *   POTASSIUM 4.3   CHLORIDE 93*   CO2 24   BUN 8.3   CR 0.77   *   MAG 2.1   PHOS 3.7     CBC  Recent Labs   Lab 12/04/24  0458   WBC 17.2*   HGB 11.7*   HCT 34.9*   *         ASSESSMENT: 49 year old male with rectosigmoid cancer and diverticulitis s/p robotic converted to open LAR and diverting loop ileostomy with repair of umbilical hernia on 11/21/2024.  Pt was discharged on 11/27/2024.  Pt presents with decreased ileostomy output with increased abdominal pain and distension on 12/4/2024.     CT scan 12/4/2024:   SBO with transition point to decompressed ileum in the anterior right abdomen adjacent to the ventral peritoneal lining.      TODAY  - Continue NGT to LIS  - continue antibiotics 4 days total    Neuro/Pain:   #Abdominal Pain  - PRN IV dilaudid    CV: no active issues  - normotensive  - monitor   - LR 75ml/hr    PULM: encourage  IS.  - encouraged ambulation  - sitting up in chair when not sleeping    GI/FEN:   #rectosigmoid cancer and diverticulitis  #s/p robotic converted to open LAR and diverting loop ileostomy with repair of umbilical hernia on 11/21/2024  #DLI  #Abdominal pain  #SBO  - NGT placed 12/4 (1.4L/24H)  - NGT to LIS  - NPO -> CLD 12/5  - I&O  - electrolyte replaced as needed  - IV pantoprazole     : voiding appropriately  - LR 75ml/hr  #Hyponatremia on admission 129  - NPO  - I&O  - recheck  #Hypochloremia 93 on admission  - recheck lab  - I&O    Heme:  #Anemia  #Drug induced coagulation defect  - Hgb 11.6 (11.7)  - recheck lab  - monitor for s/s of bleeding  - Lovenox for DVT ppx    ID:   #Cellulitis  #Incisional Seroma  - WBC on admission 17  - WBC now: 13.1  - cefazolin and flagyl ordered, duration 4 days  - UA negative  - Lactic 0.6 12/4  - LR 75ml/hr    Endocrine:   #Obesity BMI 35.04  - Glucose 127    Lines: NGT, PIV  Activity: as tolerated.  Ppx: LVX  Dispo: Floor inpatient    PHOEBE Fonseca DO  General Surgery PGY-1      Patient was seen and discussed with Dr. Walton    The above plan of care was performed and communicated to me by (physician).    I spent ____ minute face-to-face or coordinating care of Bella Quintero. Over 50% of our time on the unit was spent counseling the patient and/or coordinating care as documented in the assessment and plan.     [id number] post op hospital visit

## 2024-12-06 ENCOUNTER — APPOINTMENT (OUTPATIENT)
Dept: GENERAL RADIOLOGY | Facility: CLINIC | Age: 49
End: 2024-12-06
Payer: COMMERCIAL

## 2024-12-06 PROCEDURE — 74018 RADEX ABDOMEN 1 VIEW: CPT

## 2024-12-06 PROCEDURE — 74018 RADEX ABDOMEN 1 VIEW: CPT | Mod: 26 | Performed by: RADIOLOGY

## 2024-12-06 NOTE — ED NOTES
Abdominal dressing removed, wound cleansed with saline, redressed with guaze pad. Patient tolerated well. NG flush completed, see I and O. Pierre Lundberg RN

## 2024-12-07 LAB
ALBUMIN SERPL BCG-MCNC: 3.3 G/DL (ref 3.5–5.2)
ALP SERPL-CCNC: 80 U/L (ref 40–150)
ALT SERPL W P-5'-P-CCNC: 10 U/L (ref 0–70)
ANION GAP SERPL CALCULATED.3IONS-SCNC: 9 MMOL/L (ref 7–15)
AST SERPL W P-5'-P-CCNC: 17 U/L (ref 0–45)
BILIRUB DIRECT SERPL-MCNC: <0.2 MG/DL (ref 0–0.3)
BILIRUB SERPL-MCNC: 0.2 MG/DL
BUN SERPL-MCNC: 16 MG/DL (ref 6–20)
CALCIUM SERPL-MCNC: 8.4 MG/DL (ref 8.8–10.4)
CHLORIDE SERPL-SCNC: 101 MMOL/L (ref 98–107)
CREAT SERPL-MCNC: 0.82 MG/DL (ref 0.67–1.17)
EGFRCR SERPLBLD CKD-EPI 2021: >90 ML/MIN/1.73M2
ERYTHROCYTE [DISTWIDTH] IN BLOOD BY AUTOMATED COUNT: 13.9 % (ref 10–15)
GLUCOSE BLDC GLUCOMTR-MCNC: 102 MG/DL (ref 70–99)
GLUCOSE BLDC GLUCOMTR-MCNC: 106 MG/DL (ref 70–99)
GLUCOSE BLDC GLUCOMTR-MCNC: 116 MG/DL (ref 70–99)
GLUCOSE SERPL-MCNC: 112 MG/DL (ref 70–99)
HCO3 SERPL-SCNC: 27 MMOL/L (ref 22–29)
HCT VFR BLD AUTO: 33.2 % (ref 40–53)
HGB BLD-MCNC: 10.9 G/DL (ref 13.3–17.7)
INR PPP: 1.04 (ref 0.85–1.15)
MAGNESIUM SERPL-MCNC: 2.2 MG/DL (ref 1.7–2.3)
MCH RBC QN AUTO: 31.1 PG (ref 26.5–33)
MCHC RBC AUTO-ENTMCNC: 32.8 G/DL (ref 31.5–36.5)
MCV RBC AUTO: 95 FL (ref 78–100)
PHOSPHATE SERPL-MCNC: 4 MG/DL (ref 2.5–4.5)
PLATELET # BLD AUTO: 534 10E3/UL (ref 150–450)
POTASSIUM SERPL-SCNC: 4 MMOL/L (ref 3.4–5.3)
PREALB SERPL-MCNC: 18.9 MG/DL (ref 20–40)
PROT SERPL-MCNC: 5.9 G/DL (ref 6.4–8.3)
RBC # BLD AUTO: 3.51 10E6/UL (ref 4.4–5.9)
SODIUM SERPL-SCNC: 137 MMOL/L (ref 135–145)
WBC # BLD AUTO: 9.7 10E3/UL (ref 4–11)

## 2024-12-08 LAB
ANION GAP SERPL CALCULATED.3IONS-SCNC: 10 MMOL/L (ref 7–15)
BUN SERPL-MCNC: 14 MG/DL (ref 6–20)
CALCIUM SERPL-MCNC: 8.5 MG/DL (ref 8.8–10.4)
CHLORIDE SERPL-SCNC: 103 MMOL/L (ref 98–107)
CREAT SERPL-MCNC: 0.82 MG/DL (ref 0.67–1.17)
EGFRCR SERPLBLD CKD-EPI 2021: >90 ML/MIN/1.73M2
ERYTHROCYTE [DISTWIDTH] IN BLOOD BY AUTOMATED COUNT: 13.6 % (ref 10–15)
GLUCOSE BLDC GLUCOMTR-MCNC: 108 MG/DL (ref 70–99)
GLUCOSE BLDC GLUCOMTR-MCNC: 114 MG/DL (ref 70–99)
GLUCOSE BLDC GLUCOMTR-MCNC: 118 MG/DL (ref 70–99)
GLUCOSE BLDC GLUCOMTR-MCNC: 135 MG/DL (ref 70–99)
GLUCOSE SERPL-MCNC: 117 MG/DL (ref 70–99)
HCO3 SERPL-SCNC: 27 MMOL/L (ref 22–29)
HCT VFR BLD AUTO: 33.3 % (ref 40–53)
HGB BLD-MCNC: 10.9 G/DL (ref 13.3–17.7)
MAGNESIUM SERPL-MCNC: 2.2 MG/DL (ref 1.7–2.3)
MCH RBC QN AUTO: 30.6 PG (ref 26.5–33)
MCHC RBC AUTO-ENTMCNC: 32.7 G/DL (ref 31.5–36.5)
MCV RBC AUTO: 94 FL (ref 78–100)
PHOSPHATE SERPL-MCNC: 4.7 MG/DL (ref 2.5–4.5)
PLATELET # BLD AUTO: 452 10E3/UL (ref 150–450)
POTASSIUM SERPL-SCNC: 4 MMOL/L (ref 3.4–5.3)
RBC # BLD AUTO: 3.56 10E6/UL (ref 4.4–5.9)
SODIUM SERPL-SCNC: 140 MMOL/L (ref 135–145)
WBC # BLD AUTO: 8.1 10E3/UL (ref 4–11)

## 2024-12-09 LAB
ALBUMIN SERPL BCG-MCNC: 3.4 G/DL (ref 3.5–5.2)
ALP SERPL-CCNC: 84 U/L (ref 40–150)
ALT SERPL W P-5'-P-CCNC: 15 U/L (ref 0–70)
ANION GAP SERPL CALCULATED.3IONS-SCNC: 10 MMOL/L (ref 7–15)
ANION GAP SERPL CALCULATED.3IONS-SCNC: 20 MMOL/L (ref 7–15)
AST SERPL W P-5'-P-CCNC: 17 U/L (ref 0–45)
BILIRUB SERPL-MCNC: 0.3 MG/DL
BUN SERPL-MCNC: 13.6 MG/DL (ref 6–20)
BUN SERPL-MCNC: 14.9 MG/DL (ref 6–20)
CALCIUM SERPL-MCNC: 8.8 MG/DL (ref 8.8–10.4)
CALCIUM SERPL-MCNC: 8.8 MG/DL (ref 8.8–10.4)
CHLORIDE SERPL-SCNC: 103 MMOL/L (ref 98–107)
CHLORIDE SERPL-SCNC: 98 MMOL/L (ref 98–107)
CREAT SERPL-MCNC: 0.8 MG/DL (ref 0.67–1.17)
CREAT SERPL-MCNC: 0.94 MG/DL (ref 0.67–1.17)
EGFRCR SERPLBLD CKD-EPI 2021: >90 ML/MIN/1.73M2
EGFRCR SERPLBLD CKD-EPI 2021: >90 ML/MIN/1.73M2
ERYTHROCYTE [DISTWIDTH] IN BLOOD BY AUTOMATED COUNT: 13.9 % (ref 10–15)
GLUCOSE BLDC GLUCOMTR-MCNC: 105 MG/DL (ref 70–99)
GLUCOSE BLDC GLUCOMTR-MCNC: 122 MG/DL (ref 70–99)
GLUCOSE BLDC GLUCOMTR-MCNC: 149 MG/DL (ref 70–99)
GLUCOSE SERPL-MCNC: 118 MG/DL (ref 70–99)
GLUCOSE SERPL-MCNC: 72 MG/DL (ref 70–99)
HCO3 SERPL-SCNC: 21 MMOL/L (ref 22–29)
HCO3 SERPL-SCNC: 23 MMOL/L (ref 22–29)
HCT VFR BLD AUTO: 34 % (ref 40–53)
HGB BLD-MCNC: 10.8 G/DL (ref 13.3–17.7)
INR PPP: 1.01 (ref 0.85–1.15)
MAGNESIUM SERPL-MCNC: 2.2 MG/DL (ref 1.7–2.3)
MAGNESIUM SERPL-MCNC: 2.3 MG/DL (ref 1.7–2.3)
MCH RBC QN AUTO: 30.3 PG (ref 26.5–33)
MCHC RBC AUTO-ENTMCNC: 31.8 G/DL (ref 31.5–36.5)
MCV RBC AUTO: 95 FL (ref 78–100)
PHOSPHATE SERPL-MCNC: 4.4 MG/DL (ref 2.5–4.5)
PLATELET # BLD AUTO: 437 10E3/UL (ref 150–450)
POTASSIUM SERPL-SCNC: 4.2 MMOL/L (ref 3.4–5.3)
POTASSIUM SERPL-SCNC: 4.4 MMOL/L (ref 3.4–5.3)
PREALB SERPL-MCNC: 19.2 MG/DL (ref 20–40)
PROT SERPL-MCNC: 5.8 G/DL (ref 6.4–8.3)
RBC # BLD AUTO: 3.57 10E6/UL (ref 4.4–5.9)
SODIUM SERPL-SCNC: 136 MMOL/L (ref 135–145)
SODIUM SERPL-SCNC: 139 MMOL/L (ref 135–145)
TRIGL SERPL-MCNC: 131 MG/DL
WBC # BLD AUTO: 8.3 10E3/UL (ref 4–11)

## 2024-12-09 NOTE — TELEPHONE ENCOUNTER
RECORDS STATUS - ALL OTHER DIAGNOSIS      RECORDS RECEIVED FROM: [facility]   NOTES STATUS DETAILS   OFFICE NOTE from referring provider Epic Dr. Frandy Kauffman   DISCHARGE SUMMARY from hospital [facility]- 12/4/2024, 11/21/2024, 7/12/2024 - [facility]  11/11/2024 - [facility]     5/1/2024 - [facility]    DISCHARGE REPORT from the ER [facility] 10/13/2024 - [facility] ED   OPERATIVE REPORT [facility] 11/21/2024 - Cystoscopy, Temporary Insert Stent Bilateral Ureter (Bilateral: Urethra)   ROBOT-ASSISTED, CONVERTED TO OPEN, PROCTECTOMY, DIVERTING LOOP ILEOSTOMY; repair of umbilical hernia (Pelvis)   Sigmoidoscopy flexible (Anus)   OPEN PROCTECTOMY, DIVERTING LOOP ILEOSTOMY; repair of umbilical hernia     11/11/2024, 10/17/2024 - Colonoscopy    MEDICATION LIST [facility]    LABS     PATHOLOGY REPORTS [facility] 11/21/2024 - [id number]   11/11/2024 - [id number]   10/29/2024 - [id number]    ANYTHING RELATED TO DIAGNOSIS Epic 12/9/2024   IMAGING (NEED IMAGES & REPORT)     CT SCANS PACS CT Abdomen Pelvis: 12/4/2024-5/1/2024   XRAYS PACS Xray Abdomen: 12/6/2024, 12/4/2024, 11/24/2024

## 2024-12-10 ENCOUNTER — DOCUMENTATION ONLY (OUTPATIENT)
Dept: OTHER | Facility: CLINIC | Age: 49
End: 2024-12-10
Payer: COMMERCIAL

## 2024-12-10 LAB
ERYTHROCYTE [DISTWIDTH] IN BLOOD BY AUTOMATED COUNT: 14 % (ref 10–15)
GLUCOSE BLDC GLUCOMTR-MCNC: 116 MG/DL (ref 70–99)
GLUCOSE BLDC GLUCOMTR-MCNC: 117 MG/DL (ref 70–99)
HCT VFR BLD AUTO: 34.7 % (ref 40–53)
HGB BLD-MCNC: 11.4 G/DL (ref 13.3–17.7)
MAGNESIUM SERPL-MCNC: 2.4 MG/DL (ref 1.7–2.3)
MCH RBC QN AUTO: 30.7 PG (ref 26.5–33)
MCHC RBC AUTO-ENTMCNC: 32.9 G/DL (ref 31.5–36.5)
MCV RBC AUTO: 94 FL (ref 78–100)
PHOSPHATE SERPL-MCNC: 4.7 MG/DL (ref 2.5–4.5)
PLATELET # BLD AUTO: 412 10E3/UL (ref 150–450)
RBC # BLD AUTO: 3.71 10E6/UL (ref 4.4–5.9)
WBC # BLD AUTO: 7.5 10E3/UL (ref 4–11)

## 2024-12-18 NOTE — PROGRESS NOTES
Colon and Rectal Surgery Clinic Note    RE: Bella Quintero.  : 1975.  IVETH: 2025.    Reason for visit: post op.    HPI: Bella Quintero is a 48 year old male who presents today for a post op visit. He has a past medical history of diverticulitis, alcohol (3 bottles of wine per week) and tobacco use (current smoker). He was admitted on 2024 and 2024 for acute complicated diverticulitis. Treated with IV antibiotics with transition to PO antibiotics. Follow up CT Abdomen/Pelvis on 8/3/2024 demonstrated continued eccentric wall thickening in the sigmoid colon, findings concerning for neoplasm and unchanged prominent pelvic and portacaval lymph nodes. He presented to the ED again on 2024 for LLQ pain. He was prescribed PO cipro/flagyl. He rescheduled his colonoscopy due to a recent diverticulitis flare.      CT Abdomen/Pelvis on 10/24/2024 demonstrated continued but slightly decreased eccentric right wall thickening and enhancement of the sigmoid colon with adjacent pericolonic inflammatory changes, favoring acute on chronic diverticulitis versus sigmoid colonic neoplasm with superimposed inflammatory changes. CEA 3.0. Clinic flex on 10/29/2024 demonstrated a large, circumferential mass starting at 14 cm from the AV. He did complete a full preop colonoscopy which demonstrated the partially obstructing tumor. No other massess or polyps. He is now s/p open proctectomy with DLI on 2024. Surgical Pathology demonstrated moderately differentiated adenocarcinoma, + PNI, +TN, pT3N0. He was readmitted on 2024 for a SBO and abdominal wall cellulitis treated with ancef.     He will meet with Dr. Jordyn Chamberlain in medical/oncology on .    Interval History: Doing well, no concerns, ileostomy in place.    CT Abdomen/Pelvis (2024):  IMPRESSION:    1. Marked midsigmoid colonic wall thickening and pericolonic inflammatory fat stranding most suggestive of perforated diverticulitis and  peridiverticular phlegmon. No bety free air. No drainable abscess. Underlying malignancy felt unlikely.     Close follow-up recommended to ensure resolution.     2. Otherwise unremarkable CT.      CT Abdomen/Pelvis (7/12/2024):  IMPRESSION:     1. Eccentric appearing mural thickening with luminal narrowing, in  metabolic enhancement and pericolonic streakiness involving 10 cm  segment of sigmoid colon, concerning for possible  colitis/diverticulitis with few adjacent prominent mesocolic nodes.  Recommend posttreatment colonoscopy to rule out underlying neoplastic  lesion  2. Confluent thickening right lateral to the thickened sigmoid colon  along the lateral pelvic fascia possibly phlegmon; no drainable fluid  collection.  3. Few prominent retroperitoneal lymph nodes, as above, consider  attention on follow-up.  4. Lucency along the anterosuperior L2 vertebral body, indeterminate,  possible demineralization recommend correlation with prior imaging if  available and/or attention on follow-up as clinically desired.  5. Borderline splenomegaly.     CT Abdomen/Pelvis (8/3/2024):  IMPRESSION:   1. Continued eccentric wall thickening in the sigmoid colon compared  to 7/12/2024. No drainable fluid collection. Findings are concerning  for neoplasm. Superimposed infection is slightly improved..  2. Unchanged prominent pelvic and portacaval lymph nodes.     CT Abdomen/Pelvis (10/24/2024):  IMPRESSION:   1. Continued but slightly decreased eccentric right wall thickening  and enhancement of the sigmoid colon with adjacent pericolonic  inflammatory changes, favoring acute on chronic diverticulitis versus  sigmoid colonic neoplasm with superimposed inflammatory changes.  2. Borderline enlarged portacaval lymph node, likely reactive.     CEA (10/24/2024):  Component      Latest Ref Rng 10/24/2024  6:36 AM   CEA      ng/mL 3.0      Colonoscopy (11/11/2024):  Findings:       The perianal and digital rectal examinations were  normal.        A frond-like/villous, fungating, infiltrative, polypoid, sessile,        submucosal and ulcerated partially obstructing large mass was found in        the recto-sigmoid colon. The mass was circumferential. The mass measured        twelve cm in length. No bleeding was present. Biopsies were taken with a        cold forceps for histology. Area was tattooed with an injection of 1 mL        of Eula ink. Tumor start at 12 cm from analverge at rectosigmoid        junction.        A few small-mouthed diverticula were found in the sigmoid colon.        The terminal ileum appeared normal.        The exam was otherwise normal throughout the examined colon.                                                                                    Impression:               - Likely malignant partially obstructing tumor in                             the recto-sigmoid colon. Biopsied. Tattooed.                             - Diverticulosis in the sigmoid colon.                             - The examined portion of the ileum was normal.     Surgical Pathology (11/11/2024):  Addendum   For further evaluation, stains on block A1 (mismatch repair (MMR) protein panel, including MLH1 clone [id number]/Optiview detection, MSH2 clone [id number]/Optiview detection, MSH6 clone [id number]/Ultraview detection, and PMS2 clone [id number]/Optiview detection) are performed with appropriate controls:     MLH1: Present  MSH2: Present  MSH6: Present  PMS2: Present     Interpretation: Normal pattern of mismatch repair (MMR) protein expression by immunohistochemical stains (low probability of MSI-H) (see comment).     COMMENT: All four DNA mismatch repair proteins are expressed in the tumor nuclei, which is the pattern of normal tissue and which strongly diminishes the likelihood of Shafer Syndrome (Hereditary Nonpolyposis Colorectal Cancer; HNPCC) due to defective DNA mismatch repair.  Microsatellite instability (MSI) testing by PCR is recommended if the  patient's family history meets the [place] guidelines or Revised [place] criteria for possible Shafer syndrome.  If the tumor is MSI high this could indicate a false negative IHC test (reported to occur in about 2% of tested cases) or the possibility of an abnormality in one of the other genes involved in DNA mismatch repair.  The possibility that the tumor in this patient is due to an inherited defect in another gene not involved in mismatch repair cannot be ruled out by negative results by either IHC or MSI testing.     The following assays; ALK (D5F3), ER, HER2, PD-L1, BRAF, CD20, CD30 AZF, CD30 Formalin, MLH-1, MSH-2, MSH-6 and PMS-2, have not been validated on decalcified tissues. Results should be interpreted with caution given the possibility of false negative results on decalcified specimens.         Addendum electronically signed by Cuba Garces MD on 11/14/2024 at  3:48 AM   Final Diagnosis   Large intestine, sigmoid, mass, biopsy:  - Adenocarcinoma.  - Additional stains for mismatch repair (MMR) protein expression are pending and will be reported in an addendum.     Surgical Pathology (11/21/2024):  Synoptic Checklist   COLON AND RECTUM: Resection   8th Edition - Protocol posted: 12/13/2023COLON AND RECTUM: RESECTION - B, C, D, E, F, G  SPECIMEN   Procedure  Low anterior resection   Macroscopic Evaluation of Mesorectum  Incomplete   TUMOR   Tumor Site  Rectum   Rectal Tumor Location  Entirely above anterior peritoneal reflection   Histologic Type  Adenocarcinoma   Histologic Grade  G2, moderately differentiated   Tumor Size  Greatest dimension (Centimeters): 9.6 cm   Additional Dimension (Centimeters)  4.1 cm     2.1 cm   Tumor Extent  Invades through muscularis propria into the pericolonic or perirectal tissue   Macroscopic Tumor Perforation  Not identified   Lymphatic and / or Vascular Invasion  Not identified   Perineural Invasion  Present   Tumor Budding Score  Low (0-4)   Type of Polyp  in which Invasive Carcinoma Arose  None identified   Treatment Effect  No known presurgical therapy   MARGINS   Margin Status for Invasive Carcinoma  All margins negative for invasive carcinoma   Closest Margin(s) to Invasive Carcinoma  Radial (circumferential): 1.0   Distance from Invasive Carcinoma to Radial (Circumferential) Margin  Distance already reported as closest margin   Margin Status for Non-Invasive Tumor  All margins negative for high-grade dysplasia / intramucosal carcinoma and low-grade dysplasia   REGIONAL LYMPH NODES   Regional Lymph Node Status  All regional lymph nodes negative for tumor   Number of Lymph Nodes Examined  30   Tumor Deposits  Not identified   pTNM CLASSIFICATION (AJCC 8th Edition)   Reporting of pT, pN, and (when applicable) pM categories is based on information available to the pathologist at the time the report is issued. As per the AJCC (Chapter 1, 8th Ed.) it is the managing physician s responsibility to establish the final pathologic stage based upon all pertinent information, including but potentially not limited to this pathology report.   pT Category  pT3   pN Category  pN0   .          Medications:  Current Outpatient Medications   Medication Sig Dispense Refill    acetaminophen (TYLENOL) 500 MG tablet Take 2 tablets (1,000 mg) by mouth every 6 hours. 100 tablet 0    atorvastatin (LIPITOR) 40 MG tablet Take 40 mg by mouth daily. Take half a tab daily      ketoconazole (NIZORAL) 2 % external shampoo Apply topically daily as needed for itching or irritation 100 mL 2    omeprazole (PRILOSEC) 40 MG DR capsule Take 1 capsule (40 mg) by mouth daily. 30 capsule 0    oxyCODONE (ROXICODONE) 5 MG tablet Take 1-2 tablets (5-10 mg) by mouth every 6 hours as needed for moderate to severe pain (take 1 tab (5mg) for moderate pain or 2tabs (10mg) for severe pain). 30 tablet 0       Allergies:  Allergies   Allergen Reactions    Codeine Swelling and Itching     PN: LW Reaction: EDEMA,  GENERALIZED   Tolerated hydromorphone 5/1/24    Penicillins Other (See Comments)     Swelling, burning feeling in hands and feet. , PN: LW Reaction: EDEMA, GENERALIZED       Social history:   Social History     Tobacco Use    Smoking status: Every Day     Current packs/day: 1.00     Average packs/day: 1 pack/day for 30.5 years (30.5 ttl pk-yrs)     Types: Cigarettes     Start date: 7/9/1994    Smokeless tobacco: Never    Tobacco comments:     Revisit as pt is willing   Substance Use Topics    Alcohol use: Not Currently     Alcohol/week: 15.0 standard drinks of alcohol     Types: 15 Glasses of wine per week     Comment: 11/4/24. Minimal drinking currently. 3 bottles of wine per week. H/o 1L EtOH every other day, self decreased several years ago.     Marital status: .    ROS:  A complete review of systems was performed with the patient and all systems negative except as per HPI.    Physical Examination:  Exam was chaperoned by Fabiano Foley, EMT-P  /75 (BP Location: Right arm, Patient Position: Sitting, Cuff Size: Adult Large)   Pulse 72   SpO2 95%   General: Well hydrated. No acute distress.  Abdomen: Soft, NT, obese habitus, ileostomy in place. No inguinal adenopathy palpated.      ASSESSMENT    This is a 49 year old M with a large rectosigmoid adenocarcinoma now s/p open proctectomy with DLI on 11/21/2024. Surgical Pathology demonstrated moderately differentiated adenocarcinoma, + PNI, +TN, pT3N0. He was readmitted on 12/4/2024 for a SBO and abdominal wall cellulitis treated with ancef. He is recovering well.    All pertinent labs and imaging were personally reviewed by me.    PLAN  - Next colonoscopy due in 1 year   - Continue cares with medical/oncology  - RTC in 3 months with [facility], if undergoing chemo  - Healthy life style and weight loss were encouraged    30 minutes spent on the date of the encounter doing chart review, history and exam, imaging review, documentation and further activities as  noted above.      Frandy Kauffman MD, FACS    Division of Colon and Rectal Surgery  [facility]    Referring Provider:  No referring provider defined for this encounter.     Primary Care Provider:  Nyla Pastor

## 2024-12-19 ENCOUNTER — PATIENT OUTREACH (OUTPATIENT)
Dept: CARE COORDINATION | Facility: CLINIC | Age: 49
End: 2024-12-19
Payer: COMMERCIAL

## 2024-12-19 NOTE — PROGRESS NOTES
"Clinic Care Coordination Contact  Transitions of Care Outreach  Chief Complaint   Patient presents with    Clinic Care Coordination - Post Hospital       Most Recent Admission Date: 12/4/2024   Most Recent Admission Diagnosis: Small bowel obstruction (H) - K56.609  Abdominal wall cellulitis - L03.311     Most Recent Discharge Date: 12/10/2024   Most Recent Discharge Diagnosis: Small bowel obstruction (H) - K56.609  Abdominal wall cellulitis - L03.311     Transitions of Care Assessment    Discharge Assessment  How are you doing now that you are home?: \"Im doing okay and I am fine\"  How are your symptoms? (Red Flag symptoms escalate to triage hotline per guidelines): Unchanged  Do you know how to contact your clinic care team if you have future questions or changes to your health status? : Yes  Does the patient have their discharge instructions? : Yes  Does the patient have questions regarding their discharge instructions? : No  Were you started on any new medications or were there changes to any of your previous medications? : No  Does the patient have all of their medications?: Yes  Do you have questions regarding any of your medications? : No  Do you have all of your needed medical supplies or equipment (DME)?  (i.e. oxygen tank, CPAP, cane, etc.): Yes       Patient is doing fine and has declined CC, there ae no other needs at this time.     [facility] Explained and offered Care Coordination support to eligible patients: Yes    Patient accepted? No    Follow up Plan     Discharge Follow-Up  Discharge follow up appointment scheduled in alignment with recommended follow up timeframe or Transitions of Risk Category? (Low = within 30 days; Moderate= within 14 days; High= within 7 days): Yes  Discharge Follow Up Appointment Date: 12/23/24  Discharge Follow Up Appointment Scheduled with?: Specialty Care Provider    Future Appointments   Date Time Provider Department Center   12/23/2024  2:00 PM Dell Camacho MD [facility] "   1/7/2025 11:30 AM Frandy Kauffman MD [facility]   4/3/2025  1:00 PM Silver Sow MD [facility]       Outpatient Plan as outlined on AVS reviewed with patient.    For any urgent concerns, please contact our 24 hour nurse triage line: [phone] ([phone])       Mariana SANDHU Community Health Worker  Clinic Care Coordination  [facility]   Clinic #: [phone]  Direct #: [phone]

## 2024-12-23 ENCOUNTER — PRE VISIT (OUTPATIENT)
Dept: ONCOLOGY | Facility: CLINIC | Age: 49
End: 2024-12-23
Payer: COMMERCIAL

## 2024-12-26 NOTE — TELEPHONE ENCOUNTER
RECORDS STATUS - ALL OTHER DIAGNOSIS      RECORDS RECEIVED FROM: [facility]   NOTES STATUS DETAILS   OFFICE NOTE from referring provider Epic Dr. Frandy Kauffman    DISCHARGE SUMMARY from hospital [facility] 12/4/2024, 11/21/2024, 7/12/2024 - [facility]  11/11/2024 - [facility]      5/1/2024 - [facility]   DISCHARGE REPORT from the ER [facility] 10/13/2024 - [facility] ED    OPERATIVE REPORT [facility] 11/21/2024 - Cystoscopy, Temporary Insert Stent Bilateral Ureter (Bilateral: Urethra)   ROBOT-ASSISTED, CONVERTED TO OPEN, PROCTECTOMY, DIVERTING LOOP ILEOSTOMY; repair of umbilical hernia (Pelvis)   Sigmoidoscopy flexible (Anus)   OPEN PROCTECTOMY, DIVERTING LOOP ILEOSTOMY; repair of umbilical hernia      11/11/2024, 10/17/2024 - Colonoscopy    MEDICATION LIST [facility]    LABS     PATHOLOGY REPORTS [facility] 11/21/2024 - [id number]   11/11/2024 - [id number]   10/29/2024 - [id number]    ANYTHING RELATED TO DIAGNOSIS Epic 12/10/2024   IMAGING (NEED IMAGES & REPORT)     CT SCANS PACS CT Abdomen Pelvis: 12/4/2024-5/1/2024    XRAYS PACS Xray Abdomen: 12/6/2024, 12/4/2024, 11/24/2024

## 2025-01-07 ENCOUNTER — TELEPHONE (OUTPATIENT)
Dept: SURGERY | Facility: CLINIC | Age: 50
End: 2025-01-07

## 2025-01-07 ENCOUNTER — OFFICE VISIT (OUTPATIENT)
Dept: SURGERY | Facility: CLINIC | Age: 50
End: 2025-01-07
Payer: COMMERCIAL

## 2025-01-07 VITALS — SYSTOLIC BLOOD PRESSURE: 116 MMHG | OXYGEN SATURATION: 95 % | HEART RATE: 72 BPM | DIASTOLIC BLOOD PRESSURE: 75 MMHG

## 2025-01-07 DIAGNOSIS — E66.812 CLASS 2 OBESITY WITH BODY MASS INDEX (BMI) OF 36.0 TO 36.9 IN ADULT, UNSPECIFIED OBESITY TYPE, UNSPECIFIED WHETHER SERIOUS COMORBIDITY PRESENT: ICD-10-CM

## 2025-01-07 DIAGNOSIS — C19 MALIGNANT NEOPLASM OF RECTOSIGMOID JUNCTION (H): Primary | ICD-10-CM

## 2025-01-07 ASSESSMENT — PAIN SCALES - GENERAL: PAINLEVEL_OUTOF10: NO PAIN (0)

## 2025-01-07 NOTE — LETTER
2025       RE: Bella Quintero  [address]  [facility] [address]     Dear Colleague,    Thank you for referring your patient, Bella Quintero, to the [facility] COLON AND RECTAL SURGERY CLINIC [place] at [facility]. Please see a copy of my visit note below.    Colon and Rectal Surgery Clinic Note    RE: Bella Quintero.  : 1975.  IVETH: 2025.    Reason for visit: post op.    HPI: Bella Quintero is a 48 year old male who presents today for a post op visit. He has a past medical history of diverticulitis, alcohol (3 bottles of wine per week) and tobacco use (current smoker). He was admitted on 2024 and 2024 for acute complicated diverticulitis. Treated with IV antibiotics with transition to PO antibiotics. Follow up CT Abdomen/Pelvis on 8/3/2024 demonstrated continued eccentric wall thickening in the sigmoid colon, findings concerning for neoplasm and unchanged prominent pelvic and portacaval lymph nodes. He presented to the ED again on 2024 for LLQ pain. He was prescribed PO cipro/flagyl. He rescheduled his colonoscopy due to a recent diverticulitis flare.      CT Abdomen/Pelvis on 10/24/2024 demonstrated continued but slightly decreased eccentric right wall thickening and enhancement of the sigmoid colon with adjacent pericolonic inflammatory changes, favoring acute on chronic diverticulitis versus sigmoid colonic neoplasm with superimposed inflammatory changes. CEA 3.0. Clinic flex on 10/29/2024 demonstrated a large, circumferential mass starting at 14 cm from the AV. He did complete a full preop colonoscopy which demonstrated the partially obstructing tumor. No other massess or polyps. He is now s/p open proctectomy with DLI on 2024. Surgical Pathology demonstrated moderately differentiated adenocarcinoma, + PNI, +TN, pT3N0. He was readmitted on 2024 for a SBO and abdominal wall  cellulitis treated with ancef.     He will meet with Dr. Jordyn Chamberlain in medical/oncology on 1/16.    Interval History: Doing well, no concerns, ileostomy in place.    CT Abdomen/Pelvis (5/1/2024):  IMPRESSION:    1. Marked midsigmoid colonic wall thickening and pericolonic inflammatory fat stranding most suggestive of perforated diverticulitis and peridiverticular phlegmon. No bety free air. No drainable abscess. Underlying malignancy felt unlikely.     Close follow-up recommended to ensure resolution.     2. Otherwise unremarkable CT.      CT Abdomen/Pelvis (7/12/2024):  IMPRESSION:     1. Eccentric appearing mural thickening with luminal narrowing, in  metabolic enhancement and pericolonic streakiness involving 10 cm  segment of sigmoid colon, concerning for possible  colitis/diverticulitis with few adjacent prominent mesocolic nodes.  Recommend posttreatment colonoscopy to rule out underlying neoplastic  lesion  2. Confluent thickening right lateral to the thickened sigmoid colon  along the lateral pelvic fascia possibly phlegmon; no drainable fluid  collection.  3. Few prominent retroperitoneal lymph nodes, as above, consider  attention on follow-up.  4. Lucency along the anterosuperior L2 vertebral body, indeterminate,  possible demineralization recommend correlation with prior imaging if  available and/or attention on follow-up as clinically desired.  5. Borderline splenomegaly.     CT Abdomen/Pelvis (8/3/2024):  IMPRESSION:   1. Continued eccentric wall thickening in the sigmoid colon compared  to 7/12/2024. No drainable fluid collection. Findings are concerning  for neoplasm. Superimposed infection is slightly improved..  2. Unchanged prominent pelvic and portacaval lymph nodes.     CT Abdomen/Pelvis (10/24/2024):  IMPRESSION:   1. Continued but slightly decreased eccentric right wall thickening  and enhancement of the sigmoid colon with adjacent pericolonic  inflammatory changes, favoring acute on chronic  diverticulitis versus  sigmoid colonic neoplasm with superimposed inflammatory changes.  2. Borderline enlarged portacaval lymph node, likely reactive.     CEA (10/24/2024):  Component      Latest Ref Rng 10/24/2024  6:36 AM   CEA      ng/mL 3.0      Colonoscopy (11/11/2024):  Findings:       The perianal and digital rectal examinations were normal.        A frond-like/villous, fungating, infiltrative, polypoid, sessile,        submucosal and ulcerated partially obstructing large mass was found in        the recto-sigmoid colon. The mass was circumferential. The mass measured        twelve cm in length. No bleeding was present. Biopsies were taken with a        cold forceps for histology. Area was tattooed with an injection of 1 mL        of Eula ink. Tumor start at 12 cm from analverge at rectosigmoid        junction.        A few small-mouthed diverticula were found in the sigmoid colon.        The terminal ileum appeared normal.        The exam was otherwise normal throughout the examined colon.                                                                                    Impression:               - Likely malignant partially obstructing tumor in                             the recto-sigmoid colon. Biopsied. Tattooed.                             - Diverticulosis in the sigmoid colon.                             - The examined portion of the ileum was normal.     Surgical Pathology (11/11/2024):  Addendum   For further evaluation, stains on block A1 (mismatch repair (MMR) protein panel, including MLH1 clone [id number]/Optiview detection, MSH2 clone [id number]/Optiview detection, MSH6 clone [id number]/Ultraview detection, and PMS2 clone [id number]/Optiview detection) are performed with appropriate controls:     MLH1: Present  MSH2: Present  MSH6: Present  PMS2: Present     Interpretation: Normal pattern of mismatch repair (MMR) protein expression by immunohistochemical stains (low probability of MSI-H) (see comment).      COMMENT: All four DNA mismatch repair proteins are expressed in the tumor nuclei, which is the pattern of normal tissue and which strongly diminishes the likelihood of Shafer Syndrome (Hereditary Nonpolyposis Colorectal Cancer; HNPCC) due to defective DNA mismatch repair.  Microsatellite instability (MSI) testing by PCR is recommended if the patient's family history meets the [place] guidelines or Revised [place] criteria for possible Shafer syndrome.  If the tumor is MSI high this could indicate a false negative IHC test (reported to occur in about 2% of tested cases) or the possibility of an abnormality in one of the other genes involved in DNA mismatch repair.  The possibility that the tumor in this patient is due to an inherited defect in another gene not involved in mismatch repair cannot be ruled out by negative results by either IHC or MSI testing.     The following assays; ALK (D5F3), ER, HER2, PD-L1, BRAF, CD20, CD30 AZF, CD30 Formalin, MLH-1, MSH-2, MSH-6 and PMS-2, have not been validated on decalcified tissues. Results should be interpreted with caution given the possibility of false negative results on decalcified specimens.         Addendum electronically signed by Cuba Garces MD on 11/14/2024 at  3:48 AM   Final Diagnosis   Large intestine, sigmoid, mass, biopsy:  - Adenocarcinoma.  - Additional stains for mismatch repair (MMR) protein expression are pending and will be reported in an addendum.     Surgical Pathology (11/21/2024):  Synoptic Checklist   COLON AND RECTUM: Resection   8th Edition - Protocol posted: 12/13/2023COLON AND RECTUM: RESECTION - B, C, D, E, F, G  SPECIMEN   Procedure  Low anterior resection   Macroscopic Evaluation of Mesorectum  Incomplete   TUMOR   Tumor Site  Rectum   Rectal Tumor Location  Entirely above anterior peritoneal reflection   Histologic Type  Adenocarcinoma   Histologic Grade  G2, moderately differentiated   Tumor Size  Greatest dimension  (Centimeters): 9.6 cm   Additional Dimension (Centimeters)  4.1 cm     2.1 cm   Tumor Extent  Invades through muscularis propria into the pericolonic or perirectal tissue   Macroscopic Tumor Perforation  Not identified   Lymphatic and / or Vascular Invasion  Not identified   Perineural Invasion  Present   Tumor Budding Score  Low (0-4)   Type of Polyp in which Invasive Carcinoma Arose  None identified   Treatment Effect  No known presurgical therapy   MARGINS   Margin Status for Invasive Carcinoma  All margins negative for invasive carcinoma   Closest Margin(s) to Invasive Carcinoma  Radial (circumferential): 1.0   Distance from Invasive Carcinoma to Radial (Circumferential) Margin  Distance already reported as closest margin   Margin Status for Non-Invasive Tumor  All margins negative for high-grade dysplasia / intramucosal carcinoma and low-grade dysplasia   REGIONAL LYMPH NODES   Regional Lymph Node Status  All regional lymph nodes negative for tumor   Number of Lymph Nodes Examined  30   Tumor Deposits  Not identified   pTNM CLASSIFICATION (AJCC 8th Edition)   Reporting of pT, pN, and (when applicable) pM categories is based on information available to the pathologist at the time the report is issued. As per the AJCC (Chapter 1, 8th Ed.) it is the managing physician s responsibility to establish the final pathologic stage based upon all pertinent information, including but potentially not limited to this pathology report.   pT Category  pT3   pN Category  pN0   .          Medications:  Current Outpatient Medications   Medication Sig Dispense Refill     acetaminophen (TYLENOL) 500 MG tablet Take 2 tablets (1,000 mg) by mouth every 6 hours. 100 tablet 0     atorvastatin (LIPITOR) 40 MG tablet Take 40 mg by mouth daily. Take half a tab daily       ketoconazole (NIZORAL) 2 % external shampoo Apply topically daily as needed for itching or irritation 100 mL 2     omeprazole (PRILOSEC) 40 MG DR capsule Take 1 capsule  (40 mg) by mouth daily. 30 capsule 0     oxyCODONE (ROXICODONE) 5 MG tablet Take 1-2 tablets (5-10 mg) by mouth every 6 hours as needed for moderate to severe pain (take 1 tab (5mg) for moderate pain or 2tabs (10mg) for severe pain). 30 tablet 0       Allergies:  Allergies   Allergen Reactions     Codeine Swelling and Itching     PN: LW Reaction: EDEMA, GENERALIZED   Tolerated hydromorphone 5/1/24     Penicillins Other (See Comments)     Swelling, burning feeling in hands and feet. , PN: LW Reaction: EDEMA, GENERALIZED       Social history:   Social History     Tobacco Use     Smoking status: Every Day     Current packs/day: 1.00     Average packs/day: 1 pack/day for 30.5 years (30.5 ttl pk-yrs)     Types: Cigarettes     Start date: 7/9/1994     Smokeless tobacco: Never     Tobacco comments:     Revisit as pt is willing   Substance Use Topics     Alcohol use: Not Currently     Alcohol/week: 15.0 standard drinks of alcohol     Types: 15 Glasses of wine per week     Comment: 11/4/24. Minimal drinking currently. 3 bottles of wine per week. H/o 1L EtOH every other day, self decreased several years ago.     Marital status: .    ROS:  A complete review of systems was performed with the patient and all systems negative except as per HPI.    Physical Examination:  Exam was chaperoned by Fabiano Foley, EMT-P  /75 (BP Location: Right arm, Patient Position: Sitting, Cuff Size: Adult Large)   Pulse 72   SpO2 95%   General: Well hydrated. No acute distress.  Abdomen: Soft, NT, obese habitus, ileostomy in place. No inguinal adenopathy palpated.      ASSESSMENT    This is a 49 year old M with a large rectosigmoid adenocarcinoma now s/p open proctectomy with DLI on 11/21/2024. Surgical Pathology demonstrated moderately differentiated adenocarcinoma, + PNI, +TN, pT3N0. He was readmitted on 12/4/2024 for a SBO and abdominal wall cellulitis treated with ancef. He is recovering well.    All pertinent labs and imaging were  personally reviewed by me.    PLAN  - Next colonoscopy due in 1 year   - Continue cares with medical/oncology  - RTC in 3 months with GGE, if undergoing chemo  - Healthy life style and weight loss were encouraged    30 minutes spent on the date of the encounter doing chart review, history and exam, imaging review, documentation and further activities as noted above.      Frandy Kauffman MD, FACS    Division of Colon and Rectal Surgery  [facility]    Referring Provider:  No referring provider defined for this encounter.     Primary Care Provider:  Nyla Pastor      Again, thank you for allowing me to participate in the care of your patient.      Sincerely,    Frandy Kauffman MD

## 2025-01-07 NOTE — CONFIDENTIAL NOTE
Left Voicemail (1st Attempt) and Sent Mychart (1st Attempt) for the patient to call back and schedule the following:    Appointment type: Imaging/XR COLON WATER SOLUBLE   Provider: Radiology  Return date: 1/14/25  Specialty phone number: [phone]  Additional appointment(s) needed: n/a  Additonal Notes: Pt needs to have this done closer to his 04/08/2025 appointment with , Please reschedule to a week prior to appointment

## 2025-01-07 NOTE — PATIENT INSTRUCTIONS
Follow up in 2-3 months. Please complete a GGE before visit. You can go out front to schedule these appointments

## 2025-01-07 NOTE — NURSING NOTE
Chief Complaint   Patient presents with    Post-op Visit       Vitals:    01/07/25 1116   BP: 116/75   BP Location: Right arm   Patient Position: Sitting   Cuff Size: Adult Large   Pulse: 72   SpO2: 95%       There is no height or weight on file to calculate BMI.    Fabiano Foley EMT-P

## 2025-01-09 ENCOUNTER — TELEPHONE (OUTPATIENT)
Dept: SURGERY | Facility: CLINIC | Age: 50
End: 2025-01-09
Payer: COMMERCIAL

## 2025-01-09 NOTE — TELEPHONE ENCOUNTER
Patient confirmed scheduled appointment:  Date: 03/20/25  Time: 1000 am   Visit type: XR Colon Water Soluble   Provider: Radiology   Location: [facility]  Additional notes: pt r/s from 01/14/25 due to scheduling error

## 2025-01-16 ENCOUNTER — PRE VISIT (OUTPATIENT)
Dept: ONCOLOGY | Facility: CLINIC | Age: 50
End: 2025-01-16
Payer: COMMERCIAL

## 2025-01-22 ENCOUNTER — ONCOLOGY VISIT (OUTPATIENT)
Dept: ONCOLOGY | Facility: CLINIC | Age: 50
End: 2025-01-22
Attending: SURGERY
Payer: COMMERCIAL

## 2025-01-22 ENCOUNTER — LAB (OUTPATIENT)
Dept: LAB | Facility: CLINIC | Age: 50
End: 2025-01-22
Attending: SURGERY
Payer: COMMERCIAL

## 2025-01-22 VITALS
RESPIRATION RATE: 18 BRPM | DIASTOLIC BLOOD PRESSURE: 71 MMHG | TEMPERATURE: 98 F | WEIGHT: 222 LBS | HEART RATE: 76 BPM | BODY MASS INDEX: 33.65 KG/M2 | SYSTOLIC BLOOD PRESSURE: 113 MMHG | OXYGEN SATURATION: 94 % | HEIGHT: 68 IN

## 2025-01-22 DIAGNOSIS — C19 MALIGNANT NEOPLASM OF RECTOSIGMOID JUNCTION (H): ICD-10-CM

## 2025-01-22 LAB
ALBUMIN SERPL BCG-MCNC: 4.2 G/DL (ref 3.5–5.2)
ALP SERPL-CCNC: 119 U/L (ref 40–150)
ALT SERPL W P-5'-P-CCNC: 46 U/L (ref 0–70)
ANION GAP SERPL CALCULATED.3IONS-SCNC: 12 MMOL/L (ref 7–15)
AST SERPL W P-5'-P-CCNC: 23 U/L (ref 0–45)
BASOPHILS # BLD AUTO: 0.1 10E3/UL (ref 0–0.2)
BASOPHILS NFR BLD AUTO: 1 %
BILIRUB SERPL-MCNC: 0.2 MG/DL
BUN SERPL-MCNC: 15.6 MG/DL (ref 6–20)
CALCIUM SERPL-MCNC: 9.1 MG/DL (ref 8.8–10.4)
CEA SERPL-MCNC: 1.1 NG/ML
CHLORIDE SERPL-SCNC: 104 MMOL/L (ref 98–107)
CREAT SERPL-MCNC: 0.81 MG/DL (ref 0.67–1.17)
EGFRCR SERPLBLD CKD-EPI 2021: >90 ML/MIN/1.73M2
EOSINOPHIL # BLD AUTO: 0.4 10E3/UL (ref 0–0.7)
EOSINOPHIL NFR BLD AUTO: 4 %
ERYTHROCYTE [DISTWIDTH] IN BLOOD BY AUTOMATED COUNT: 13.7 % (ref 10–15)
GLUCOSE SERPL-MCNC: 106 MG/DL (ref 70–99)
HCO3 SERPL-SCNC: 22 MMOL/L (ref 22–29)
HCT VFR BLD AUTO: 42.1 % (ref 40–53)
HGB BLD-MCNC: 13.6 G/DL (ref 13.3–17.7)
IMM GRANULOCYTES # BLD: 0.1 10E3/UL
IMM GRANULOCYTES NFR BLD: 1 %
LYMPHOCYTES # BLD AUTO: 2.4 10E3/UL (ref 0.8–5.3)
LYMPHOCYTES NFR BLD AUTO: 23 %
MCH RBC QN AUTO: 30.1 PG (ref 26.5–33)
MCHC RBC AUTO-ENTMCNC: 32.3 G/DL (ref 31.5–36.5)
MCV RBC AUTO: 93 FL (ref 78–100)
MONOCYTES # BLD AUTO: 0.9 10E3/UL (ref 0–1.3)
MONOCYTES NFR BLD AUTO: 8 %
NEUTROPHILS # BLD AUTO: 6.7 10E3/UL (ref 1.6–8.3)
NEUTROPHILS NFR BLD AUTO: 64 %
NRBC # BLD AUTO: 0 10E3/UL
NRBC BLD AUTO-RTO: 0 /100
PLATELET # BLD AUTO: 316 10E3/UL (ref 150–450)
POTASSIUM SERPL-SCNC: 4.2 MMOL/L (ref 3.4–5.3)
PROT SERPL-MCNC: 7.1 G/DL (ref 6.4–8.3)
RBC # BLD AUTO: 4.52 10E6/UL (ref 4.4–5.9)
SODIUM SERPL-SCNC: 138 MMOL/L (ref 135–145)
WBC # BLD AUTO: 10.6 10E3/UL (ref 4–11)

## 2025-01-22 PROCEDURE — 84460 ALANINE AMINO (ALT) (SGPT): CPT

## 2025-01-22 PROCEDURE — 85014 HEMATOCRIT: CPT

## 2025-01-22 PROCEDURE — 36415 COLL VENOUS BLD VENIPUNCTURE: CPT

## 2025-01-22 PROCEDURE — 82310 ASSAY OF CALCIUM: CPT

## 2025-01-22 PROCEDURE — 82378 CARCINOEMBRYONIC ANTIGEN: CPT

## 2025-01-22 PROCEDURE — 85004 AUTOMATED DIFF WBC COUNT: CPT

## 2025-01-22 PROCEDURE — G0463 HOSPITAL OUTPT CLINIC VISIT: HCPCS | Performed by: STUDENT IN AN ORGANIZED HEALTH CARE EDUCATION/TRAINING PROGRAM

## 2025-01-22 PROCEDURE — 84155 ASSAY OF PROTEIN SERUM: CPT

## 2025-01-22 ASSESSMENT — PAIN SCALES - GENERAL: PAINLEVEL_OUTOF10: NO PAIN (0)

## 2025-01-22 NOTE — NURSING NOTE
"Oncology Rooming Note    January 22, 2025 8:08 AM   Bella Quintero is a 49 year old male who presents for:    Chief Complaint   Patient presents with    Oncology Clinic Visit     Malignant neoplasm of rectosigmoid junction     Initial Vitals: /71   Pulse 76   Temp 98  F (36.7  C) (Oral)   Resp (!) 18   Ht 1.727 m (5' 8\")   Wt 100.7 kg (222 lb)   SpO2 94%   BMI 33.75 kg/m   Estimated body mass index is 33.75 kg/m  as calculated from the following:    Height as of this encounter: 1.727 m (5' 8\").    Weight as of this encounter: 100.7 kg (222 lb). Body surface area is 2.2 meters squared.  No Pain (0) Comment: Data Unavailable   No LMP for male patient.  Allergies reviewed: Yes  Medications reviewed: Yes    Medications: Medication refills not needed today.  Pharmacy name entered into EPIC:    [place], [place] - [address] [place] [address] [phone]  [id number] - [place], [place] - [address] [place] [address]    Frailty Screening:   Is the patient here for a new oncology consult visit in cancer care? 1. Yes. Over the past month, have you experienced difficulty or required a caregiver to assist with:   1. Balance, walking or general mobility (including any falls)? NO  2. Completion of self-care tasks such as bathing, dressing, toileting, grooming/hygiene?  NO  3. Concentration or memory that affects your daily life?  NO       Clinical concerns: New patient        Elle Cortés              "

## 2025-01-22 NOTE — NURSING NOTE
Chief Complaint   Patient presents with    Labs Only    Blood Draw     Labs drawn via  by RN.     Labs collected from venipuncture by RN.     Zoie Sanchez RN

## 2025-01-22 NOTE — PROGRESS NOTES
[facility] - Medical Oncology New Outpatient Consult Note  2025    Patient Identifiers     Name: Bella Quintero  Preferred Address: [place]  Preferred Language: English  : 1975  Gender: male    Assessment and Plan     Mr. 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    NGS: N/A localized  Immuno: pMMR  Clinical Trial: N/A    Mr. Quintero presents today for follow-up of incidentally discovered stage II rectosigmoid cancer.  He was undergoing workup and management of his diverticulitis, with planned resection when they discovered a rectosigmoid mass, which was biopsy confirmed adenocarcinoma.  He underwent resection which revealed T3 N0 disease.  He has subsequently been referred to medical oncology for postsurgical surveillance.    I discussed the implications of T3 N0 disease with Jarod, and reviewed his risk criteria.  He has no evidence of PNI/LVI, low tumor budding, moderately differentiated tumor, and no T4 or dima disease.  Given the absence of high risk features in his case, we would  classically recommend against adjuvant chemotherapy.  Results from the dynamic studies suggest that ctDNA testing would be an equivalent risk monitoring methodology.  Given he has no strong inclination to proceed with chemotherapy, and further signal from ctDNA would not help to shed light on his true risk, I have recommended against ctDNA surveillance at this time.  Jarod understands this reasoning and agrees with our recommendations for imaging surveillance only.    Of note, he remains an active smoker.  I reviewed that there is no chemotherapeutic treatment I could provide him which would be as effective as smoking cessation and the reduction of his future risk for cancer.  He acknowledges this risk, and notes that he will continue efforts for  "smoking cessation on his own.    Labs today, with follow-up labs and TAYLOR visit in 3 months.  Plan for MD visit in 6 months with CT scans and labs prior.    60 minutes spent on the date of the encounter doing chart review, review of test results, interpretation of tests, patient visit, documentation, and discussion with other provider(s)     Matt Boston MD, PhD   of Medicine  Division of Hematology, Oncology and Transplantation  [facility]    -----------------------------------    Oncology Summary and HPI      Cancer Staging   Malignant neoplasm of rectosigmoid junction (H)  Staging form: Colon and Rectum, AJCC 8th Edition  - Pathologic stage from 11/21/2024: Stage IIA (pT3, pN0, cM0) - Signed by Matt Boston MD on 1/22/2025      Oncology History   Malignant neoplasm of rectosigmoid junction (H)   11/21/2024 Initial Diagnosis    Malignant neoplasm of rectosigmoid junction (H)     11/21/2024 -  Cancer Staged    Staging form: Colon and Rectum, AJCC 8th Edition  - Pathologic stage from 11/21/2024: Stage IIA (pT3, pN0, cM0)         Subjective/Interval Events     - feeling pretty good; recovering well from the surgery  - had some blockage in the first couple of weeks, but hasn't had much discomfort since      Physical Exam     Vital signs: /71   Pulse 76   Temp 98  F (36.7  C) (Oral)   Resp (!) 18   Ht 1.727 m (5' 8\")   Wt 100.7 kg (222 lb)   SpO2 94%   BMI 33.75 kg/m      ECOG performance status:  0  Vascular access:  none    Physical Exam:  Exam performed, notable for:  - no focal findings; generally well appearing  - brown stool in ostomy bag  - abd soft, NT/ND, no HSM  - no BLE swelling; no pain on calf palpation    Objective Data     Lab data:  I have personally reviewed the lab data, notable for:    - repeat labs pending    Radiology data:  I have personally reviewed the radiology data, notable for:  12/04/2024 CT Abd/Pelvis  IMPRESSION:   1.  Small bowel obstruction " with transition point in the anterior right abdomen.  2.  Postoperative changes of partial sigmoid resection with right lower quadrant ileostomy.    Pathology and other data:  I have personally reviewed and interpreted the pathology data, notable for:    - no new data    Medical/Surgical History     Past medical history:  Active Ambulatory Problems     Diagnosis Date Noted    Acute colitis 07/12/2024    Left lower quadrant abdominal pain 07/12/2024    Accidental fentanyl overdose (H) 06/06/2023    Acute respiratory failure with hypoxia (H) 06/06/2023    Aspiration pneumonitis (H) 06/06/2023    Closed fracture of distal phalanx or phalanges of hand 07/10/2013    Hypotension 06/06/2023    Scabies 01/12/2011    Malignant neoplasm of rectosigmoid junction (H) 11/21/2024    Acute kidney failure, unspecified 11/25/2024    Small bowel obstruction (H) 12/04/2024    Abdominal wall cellulitis 12/04/2024     Resolved Ambulatory Problems     Diagnosis Date Noted    No Resolved Ambulatory Problems     Past Medical History:   Diagnosis Date    Bilateral low back pain with right-sided sciatica     Diverticulitis of colon 05/01/2024    History of alcohol use disorder     HLD (hyperlipidemia)     Obesity     Tobacco use disorder        Past surgical history:  Past Surgical History:   Procedure Laterality Date    COLONOSCOPY N/A 11/11/2024    Procedure: COLONOSCOPY, WITH POLYPECTOMY AND BIOPSY;  Surgeon: Ranjan Verdin MD;  Location:  GI    DAVINCI ASSISTED RESECTION LOW ANTERIOR N/A 11/21/2024    Procedure: ROBOT-ASSISTED, CONVERTED TO OPEN, PROCTECTOMY, DIVERTING LOOP ILEOSTOMY; repair of umbilical hernia;  Surgeon: Frandy Kauffman MD;  Location: [facility] OR    INSERT STENT URETER Bilateral 11/21/2024    Procedure: Cystoscopy, Temporary Insert Stent Bilateral Ureter;  Surgeon: Xavier Art MD;  Location: [facility] OR    RESECTION ABDOMINAL PERINEAL N/A 11/21/2024    Procedure: OPEN PROCTECTOMY, DIVERTING LOOP ILEOSTOMY; repair of  umbilical hernia;  Surgeon: Frandy Kauffman MD;  Location: [facility] OR    SIGMOIDOSCOPY FLEXIBLE N/A 11/21/2024    Procedure: Sigmoidoscopy flexible;  Surgeon: Frandy Kauffman MD;  Location: [facility] OR        Social history:   Social History     Tobacco Use    Smoking status: Every Day     Current packs/day: 1.00     Average packs/day: 1 pack/day for 30.5 years (30.5 ttl pk-yrs)     Types: Cigarettes     Start date: 7/9/1994    Smokeless tobacco: Never    Tobacco comments:     Revisit as pt is willing   Substance Use Topics    Alcohol use: Not Currently     Alcohol/week: 15.0 standard drinks of alcohol     Types: 15 Glasses of wine per week     Comment: 11/4/24. Minimal drinking currently. 3 bottles of wine per week. H/o 1L EtOH every other day, self decreased several years ago.    Drug use: Yes     Types: Marijuana     Comment: Daily       Family history:  Family History   Problem Relation Age of Onset    Colon Polyps Mother     Alcoholism Father     Diabetes Father     Colon Cancer No family hx of     Anesthesia Reaction No family hx of     Deep Vein Thrombosis (DVT) No family hx of     Clotting Disorder No family hx of        Allergies:   Allergies   Allergen Reactions    Codeine Swelling and Itching     PN: LW Reaction: EDEMA, GENERALIZED   Tolerated hydromorphone 5/1/24    Penicillins Other (See Comments)     Swelling, burning feeling in hands and feet. , PN: LW Reaction: EDEMA, GENERALIZED       Outpatient medications:     Current Outpatient Medications:     acetaminophen (TYLENOL) 500 MG tablet, Take 2 tablets (1,000 mg) by mouth every 6 hours., Disp: 100 tablet, Rfl: 0    atorvastatin (LIPITOR) 40 MG tablet, Take 40 mg by mouth daily. Take half a tab daily, Disp: , Rfl:     ketoconazole (NIZORAL) 2 % external shampoo, Apply topically daily as needed for itching or irritation, Disp: 100 mL, Rfl: 2    oxyCODONE (ROXICODONE) 5 MG tablet, Take 1-2 tablets (5-10 mg) by mouth every 6 hours as needed for moderate to severe  pain (take 1 tab (5mg) for moderate pain or 2tabs (10mg) for severe pain). (Patient not taking: Reported on 1/22/2025), Disp: 30 tablet, Rfl: 0

## 2025-01-22 NOTE — LETTER
2025      Bella Quintero  [address]  [facility] [address]      Dear Colleague,    Thank you for referring your patient, Bella Quintero, to the [facility]. Please see a copy of my visit note below.    [facility] - Medical Oncology New Outpatient Consult Note  2025    Patient Identifiers     Name: Bella Quintero  Preferred Address: [place]  Preferred Language: English  : 1975  Gender: male    Assessment and Plan     Mr. 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    NGS: N/A localized  Immuno: pMMR  Clinical Trial: N/A    Mr. Quintero presents today for follow-up of incidentally discovered stage II rectosigmoid cancer.  He was undergoing workup and management of his diverticulitis, with planned resection when they discovered a rectosigmoid mass, which was biopsy confirmed adenocarcinoma.  He underwent resection which revealed T3 N0 disease.  He has subsequently been referred to medical oncology for postsurgical surveillance.    I discussed the implications of T3 N0 disease with [place], and reviewed his risk criteria.  He has no evidence of PNI/LVI, low tumor budding, moderately differentiated tumor, and no T4 or dima disease.  Given the absence of high risk features in his case, we would  classically recommend against adjuvant chemotherapy.  Results from the dynamic studies suggest that ctDNA testing would be an equivalent risk monitoring methodology.  Given he has no strong inclination to proceed with chemotherapy, and further signal from ctDNA would not help to shed light on his true risk, I have recommended against ctDNA surveillance at this time.  Jarod understands this reasoning and agrees with our recommendations for imaging surveillance only.    Of note, he remains an  "active smoker.  I reviewed that there is no chemotherapeutic treatment I could provide him which would be as effective as smoking cessation and the reduction of his future risk for cancer.  He acknowledges this risk, and notes that he will continue efforts for smoking cessation on his own.    Labs today, with follow-up labs and TAYLOR visit in 3 months.  Plan for MD visit in 6 months with CT scans and labs prior.    60 minutes spent on the date of the encounter doing chart review, review of test results, interpretation of tests, patient visit, documentation, and discussion with other provider(s)     Matt Boston MD, PhD   of Medicine  Division of Hematology, Oncology and Transplantation  [facility]    -----------------------------------    Oncology Summary and HPI      Cancer Staging   Malignant neoplasm of rectosigmoid junction (H)  Staging form: Colon and Rectum, AJCC 8th Edition  - Pathologic stage from 11/21/2024: Stage IIA (pT3, pN0, cM0) - Signed by Matt Boston MD on 1/22/2025      Oncology History   Malignant neoplasm of rectosigmoid junction (H)   11/21/2024 Initial Diagnosis    Malignant neoplasm of rectosigmoid junction (H)     11/21/2024 -  Cancer Staged    Staging form: Colon and Rectum, AJCC 8th Edition  - Pathologic stage from 11/21/2024: Stage IIA (pT3, pN0, cM0)         Subjective/Interval Events     - feeling pretty good; recovering well from the surgery  - had some blockage in the first couple of weeks, but hasn't had much discomfort since      Physical Exam     Vital signs: /71   Pulse 76   Temp 98  F (36.7  C) (Oral)   Resp (!) 18   Ht 1.727 m (5' 8\")   Wt 100.7 kg (222 lb)   SpO2 94%   BMI 33.75 kg/m      ECOG performance status:  0  Vascular access:  none    Physical Exam:  Exam performed, notable for:  - no focal findings; generally well appearing  - brown stool in ostomy bag  - abd soft, NT/ND, no HSM  - no BLE swelling; no pain on calf " palpation    Objective Data     Lab data:  I have personally reviewed the lab data, notable for:    - repeat labs pending    Radiology data:  I have personally reviewed the radiology data, notable for:  12/04/2024 CT Abd/Pelvis  IMPRESSION:   1.  Small bowel obstruction with transition point in the anterior right abdomen.  2.  Postoperative changes of partial sigmoid resection with right lower quadrant ileostomy.    Pathology and other data:  I have personally reviewed and interpreted the pathology data, notable for:    - no new data    Medical/Surgical History     Past medical history:  Active Ambulatory Problems     Diagnosis Date Noted     Acute colitis 07/12/2024     Left lower quadrant abdominal pain 07/12/2024     Accidental fentanyl overdose (H) 06/06/2023     Acute respiratory failure with hypoxia (H) 06/06/2023     Aspiration pneumonitis (H) 06/06/2023     Closed fracture of distal phalanx or phalanges of hand 07/10/2013     Hypotension 06/06/2023     Scabies 01/12/2011     Malignant neoplasm of rectosigmoid junction (H) 11/21/2024     Acute kidney failure, unspecified 11/25/2024     Small bowel obstruction (H) 12/04/2024     Abdominal wall cellulitis 12/04/2024     Resolved Ambulatory Problems     Diagnosis Date Noted     No Resolved Ambulatory Problems     Past Medical History:   Diagnosis Date     Bilateral low back pain with right-sided sciatica      Diverticulitis of colon 05/01/2024     History of alcohol use disorder      HLD (hyperlipidemia)      Obesity      Tobacco use disorder        Past surgical history:  Past Surgical History:   Procedure Laterality Date     COLONOSCOPY N/A 11/11/2024    Procedure: COLONOSCOPY, WITH POLYPECTOMY AND BIOPSY;  Surgeon: Ranjan Verdin MD;  Location: [facility]     DAVINCI ASSISTED RESECTION LOW ANTERIOR N/A 11/21/2024    Procedure: ROBOT-ASSISTED, CONVERTED TO OPEN, PROCTECTOMY, DIVERTING LOOP ILEOSTOMY; repair of umbilical hernia;  Surgeon: Frandy Kauffman,  MD;  Location: [facility] OR     INSERT STENT URETER Bilateral 11/21/2024    Procedure: Cystoscopy, Temporary Insert Stent Bilateral Ureter;  Surgeon: Xavier Art MD;  Location: [facility] OR     RESECTION ABDOMINAL PERINEAL N/A 11/21/2024    Procedure: OPEN PROCTECTOMY, DIVERTING LOOP ILEOSTOMY; repair of umbilical hernia;  Surgeon: Frandy Kauffman MD;  Location: [facility] OR     SIGMOIDOSCOPY FLEXIBLE N/A 11/21/2024    Procedure: Sigmoidoscopy flexible;  Surgeon: Frandy Kauffman MD;  Location: [facility] OR        Social history:   Social History     Tobacco Use     Smoking status: Every Day     Current packs/day: 1.00     Average packs/day: 1 pack/day for 30.5 years (30.5 ttl pk-yrs)     Types: Cigarettes     Start date: 7/9/1994     Smokeless tobacco: Never     Tobacco comments:     Revisit as pt is willing   Substance Use Topics     Alcohol use: Not Currently     Alcohol/week: 15.0 standard drinks of alcohol     Types: 15 Glasses of wine per week     Comment: 11/4/24. Minimal drinking currently. 3 bottles of wine per week. H/o 1L EtOH every other day, self decreased several years ago.     Drug use: Yes     Types: Marijuana     Comment: Daily       Family history:  Family History   Problem Relation Age of Onset     Colon Polyps Mother      Alcoholism Father      Diabetes Father      Colon Cancer No family hx of      Anesthesia Reaction No family hx of      Deep Vein Thrombosis (DVT) No family hx of      Clotting Disorder No family hx of        Allergies:   Allergies   Allergen Reactions     Codeine Swelling and Itching     PN: LW Reaction: EDEMA, GENERALIZED   Tolerated hydromorphone 5/1/24     Penicillins Other (See Comments)     Swelling, burning feeling in hands and feet. , PN: LW Reaction: EDEMA, GENERALIZED       Outpatient medications:     Current Outpatient Medications:      acetaminophen (TYLENOL) 500 MG tablet, Take 2 tablets (1,000 mg) by mouth every 6 hours., Disp: 100 tablet, Rfl: 0     atorvastatin (LIPITOR) 40 MG tablet, Take  40 mg by mouth daily. Take half a tab daily, Disp: , Rfl:      ketoconazole (NIZORAL) 2 % external shampoo, Apply topically daily as needed for itching or irritation, Disp: 100 mL, Rfl: 2     oxyCODONE (ROXICODONE) 5 MG tablet, Take 1-2 tablets (5-10 mg) by mouth every 6 hours as needed for moderate to severe pain (take 1 tab (5mg) for moderate pain or 2tabs (10mg) for severe pain). (Patient not taking: Reported on 1/22/2025), Disp: 30 tablet, Rfl: 0      Again, thank you for allowing me to participate in the care of your patient.        Sincerely,        Matt Boston MD    Electronically signed

## 2025-03-29 NOTE — PROGRESS NOTES
Chief Complaint   Patient presents with    Results    Form Completion     need note for work       1. Have you been to the ER, urgent care clinic since your last visit? Hospitalized since your last visit? No    2. Have you seen or consulted any other health care providers outside of the [address] [place] [address] since your last visit? Include any pap smears or colon screening.  No Colon and Rectal Surgery Clinic Note    RE: Bella Quintero.  : 1975.  IVETH: 2025.    Reason for visit: follow up.    HPI: Bella Quintero is a 48 year old male who presents today for a follow up. He has a past medical history of diverticulitis, alcohol (3 bottles of wine per week) and tobacco use (current smoker). He was admitted on 2024 and 2024 for acute complicated diverticulitis. Treated with IV antibiotics with transition to PO antibiotics. Follow up CT Abdomen/Pelvis on 8/3/2024 demonstrated continued eccentric wall thickening in the sigmoid colon, findings concerning for neoplasm and unchanged prominent pelvic and portacaval lymph nodes. He presented to the ED again on 2024 for LLQ pain. He was prescribed PO cipro/flagyl. He rescheduled his colonoscopy due to a recent diverticulitis flare.      CT Abdomen/Pelvis on 10/24/2024 demonstrated continued but slightly decreased eccentric right wall thickening and enhancement of the sigmoid colon with adjacent pericolonic inflammatory changes, favoring acute on chronic diverticulitis versus sigmoid colonic neoplasm with superimposed inflammatory changes. CEA 3.0. Clinic flex on 10/29/2024 demonstrated a large, circumferential mass starting at 14 cm from the AV. He did complete a full preop colonoscopy which demonstrated the partially obstructing tumor. No other massess or polyps. He is s/p open proctectomy with DLI on 2024. Surgical Pathology demonstrated moderately differentiated adenocarcinoma, + PNI, +TN, pT3N0. He was readmitted on 2024 for a SBO and abdominal wall cellulitis treated with ancef.      He will meet with Dr Matt Boston who recommended surveillance. GGE on 3/20/2025 demonstrated no evidence of anastomotic leak or strictures.     Interval History: Doing well, still smoking.    ORIGINAL STAGING:     CT Abdomen/Pelvis (10/24/2024):  IMPRESSION:   1. Continued but slightly decreased eccentric right wall  thickening  and enhancement of the sigmoid colon with adjacent pericolonic  inflammatory changes, favoring acute on chronic diverticulitis versus  sigmoid colonic neoplasm with superimposed inflammatory changes.  2. Borderline enlarged portacaval lymph node, likely reactive.     CEA (10/24/2024):  Component      Latest Ref Rng 10/24/2024  6:36 AM   CEA      ng/mL 3.0       Colonoscopy (11/11/2024):  Findings:       The perianal and digital rectal examinations were normal.        A frond-like/villous, fungating, infiltrative, polypoid, sessile,        submucosal and ulcerated partially obstructing large mass was found in        the recto-sigmoid colon. The mass was circumferential. The mass measured        twelve cm in length. No bleeding was present. Biopsies were taken with a        cold forceps for histology. Area was tattooed with an injection of 1 mL        of Eula ink. Tumor start at 12 cm from analverge at rectosigmoid        junction.        A few small-mouthed diverticula were found in the sigmoid colon.        The terminal ileum appeared normal.        The exam was otherwise normal throughout the examined colon.                                                                                    Impression:               - Likely malignant partially obstructing tumor in                             the recto-sigmoid colon. Biopsied. Tattooed.                             - Diverticulosis in the sigmoid colon.                             - The examined portion of the ileum was normal.      Surgical Pathology (11/11/2024):  Addendum   For further evaluation, stains on block A1 (mismatch repair (MMR) protein panel, including MLH1 clone [id number]/Optiview detection, MSH2 clone [id number]/Optiview detection, MSH6 clone [id number]/Ultraview detection, and PMS2 clone [id number]/Optiview detection) are performed with appropriate controls:     MLH1: Present  MSH2: Present  MSH6: Present  PMS2: Present     Interpretation: Normal pattern  of mismatch repair (MMR) protein expression by immunohistochemical stains (low probability of MSI-H) (see comment).     COMMENT: All four DNA mismatch repair proteins are expressed in the tumor nuclei, which is the pattern of normal tissue and which strongly diminishes the likelihood of Shafer Syndrome (Hereditary Nonpolyposis Colorectal Cancer; HNPCC) due to defective DNA mismatch repair.  Microsatellite instability (MSI) testing by PCR is recommended if the patient's family history meets the [place] guidelines or Revised [place] criteria for possible Shafer syndrome.  If the tumor is MSI high this could indicate a false negative IHC test (reported to occur in about 2% of tested cases) or the possibility of an abnormality in one of the other genes involved in DNA mismatch repair.  The possibility that the tumor in this patient is due to an inherited defect in another gene not involved in mismatch repair cannot be ruled out by negative results by either IHC or MSI testing.     The following assays; ALK (D5F3), ER, HER2, PD-L1, BRAF, CD20, CD30 AZF, CD30 Formalin, MLH-1, MSH-2, MSH-6 and PMS-2, have not been validated on decalcified tissues. Results should be interpreted with caution given the possibility of false negative results on decalcified specimens.         Addendum electronically signed by Cuba Garces MD on 11/14/2024 at  3:48 AM   Final Diagnosis   Large intestine, sigmoid, mass, biopsy:  - Adenocarcinoma.  - Additional stains for mismatch repair (MMR) protein expression are pending and will be reported in an addendum.      POST SURGERY:     Surgical Pathology (11/21/2024):  Synoptic Checklist   COLON AND RECTUM: Resection   8th Edition - Protocol posted: 12/13/2023COLON AND RECTUM: RESECTION - B, C, D, E, F, G       SPECIMEN   Procedure   Low anterior resection   Macroscopic Evaluation of Mesorectum   Incomplete   TUMOR   Tumor Site   Rectum   Rectal Tumor Location   Entirely above anterior  peritoneal reflection   Histologic Type   Adenocarcinoma   Histologic Grade   G2, moderately differentiated   Tumor Size   Greatest dimension (Centimeters): 9.6 cm   Additional Dimension (Centimeters)   4.1 cm       2.1 cm   Tumor Extent   Invades through muscularis propria into the pericolonic or perirectal tissue   Macroscopic Tumor Perforation   Not identified   Lymphatic and / or Vascular Invasion   Not identified   Perineural Invasion   Present   Tumor Budding Score   Low (0-4)   Type of Polyp in which Invasive Carcinoma Arose   None identified   Treatment Effect   No known presurgical therapy   MARGINS   Margin Status for Invasive Carcinoma   All margins negative for invasive carcinoma   Closest Margin(s) to Invasive Carcinoma   Radial (circumferential): 1.0   Distance from Invasive Carcinoma to Radial (Circumferential) Margin   Distance already reported as closest margin   Margin Status for Non-Invasive Tumor   All margins negative for high-grade dysplasia / intramucosal carcinoma and low-grade dysplasia   REGIONAL LYMPH NODES   Regional Lymph Node Status   All regional lymph nodes negative for tumor   Number of Lymph Nodes Examined   30   Tumor Deposits   Not identified   pTNM CLASSIFICATION (AJCC 8th Edition)   Reporting of pT, pN, and (when applicable) pM categories is based on information available to the pathologist at the time the report is issued. As per the AJCC (Chapter 1, 8th Ed.) it is the managing physician s responsibility to establish the final pathologic stage based upon all pertinent information, including but potentially not limited to this pathology report.   pT Category   pT3   pN Category   pN0   .        CEA (1/22/2025):  Component      Latest Ref Rng 1/22/2025  8:47 AM   CEA      ng/mL 1.1      GGE (3/20/2025):  Findings: The  film demonstrates a paucity of bowel gas.     A water soluble enema was performed with gastrografin via a rectal  tube under gravity. No evidence of leakage  of contrast. No significant  narrowing to suggest a stricture or mass.                                                                       Impression: Postsurgical changes of proctectomy and diverting loop  ileostomy. No evidence of contrast leakage at the anastomotic site    Medications:  Current Outpatient Medications   Medication Sig Dispense Refill    ketoconazole (NIZORAL) 2 % external shampoo Apply topically daily as needed for itching or irritation 100 mL 2    acetaminophen (TYLENOL) 500 MG tablet Take 2 tablets (1,000 mg) by mouth every 6 hours. 100 tablet 0    atorvastatin (LIPITOR) 40 MG tablet Take 40 mg by mouth daily. Take half a tab daily      oxyCODONE (ROXICODONE) 5 MG tablet Take 1-2 tablets (5-10 mg) by mouth every 6 hours as needed for moderate to severe pain (take 1 tab (5mg) for moderate pain or 2tabs (10mg) for severe pain). (Patient not taking: Reported on 1/22/2025) 30 tablet 0       Allergies:  Allergies   Allergen Reactions    Codeine Swelling and Itching     PN: LW Reaction: EDEMA, GENERALIZED   Tolerated hydromorphone 5/1/24    Penicillins Other (See Comments)     Swelling, burning feeling in hands and feet. , PN: LW Reaction: EDEMA, GENERALIZED       Social history:   Social History     Tobacco Use    Smoking status: Every Day     Current packs/day: 1.00     Average packs/day: 1 pack/day for 30.7 years (30.7 ttl pk-yrs)     Types: Cigarettes     Start date: 7/9/1994    Smokeless tobacco: Never    Tobacco comments:     Revisit as pt is willing   Substance Use Topics    Alcohol use: Not Currently     Alcohol/week: 15.0 standard drinks of alcohol     Types: 15 Glasses of wine per week     Comment: 11/4/24. Minimal drinking currently. 3 bottles of wine per week. H/o 1L EtOH every other day, self decreased several years ago.     Marital status: .    ROS:  A complete review of systems was performed with the patient and all systems negative except as per HPI.    Physical  "Examination:  /75 (BP Location: Left arm, Patient Position: Sitting, Cuff Size: Adult Large)   Pulse 72   Ht 1.727 m (5' 8\")   Wt 99.3 kg (218 lb 14.4 oz)   SpO2 96%   BMI 33.28 kg/m    General: Well hydrated. No acute distress.  Abdomen: Soft, NT, obese habitus  Perianal external examination:  Perianal skin: intact.  Lesions: No.  Eversion of buttocks: There was not evidence of an anal fissure. Details: N/A.  Skin tags or external hemorrhoids: No.  Digital rectal examination: Was performed.   Sphincter tone: Good.  Palpable lesions: No.      Procedures:  Flexible sigmoidoscopy: after obtaining informed consent and performing a \"time out\", an adult flexible sigmoidoscope was introduced through the anus and passed up to the mid sigmoid colon. The quality of the prep was good. Findings: intact anastomosis with no signs of recurrence. There was no active ulceration, inflammation or bleeding. No additional abnormalities were seen. Total scope time: 10 minutes. The patient tolerated the procedure well. .    ASSESSMENT     This is a 49 year old M with a large rectosigmoid adenocarcinoma now s/p open LAR with DLI on 11/21/2024. Surgical Pathology demonstrated moderately differentiated adenocarcinoma, + PNI, +TN, pT3N0 with no recs for adjuvant treatment. GGE and flex sig show an intact anastomosis. Reversal is indicated. All questions were answered. Risks and benefits were discussed, he agreed on proceeding with surgical intervention.    All pertinent labs and imaging were personally reviewed by me.     PLAN  - To OR for ileostomy take down  - No bowel prep  - Preop teaching and eval  - Healthy life style and weight loss were encouraged     30 minutes spent on the date of the encounter doing chart review, history and exam, imaging review, documentation and further activities as noted above, excluding the procedure.      Frandy Kauffman MD, FACS, FASCRS, FSSO    Division of Colon and Rectal " Surgery  [facility]    Referring Provider:  Frandy Kauffman MD  [address] [place] [address]     Primary Care Provider:  Nyla Pastor

## 2025-04-08 ENCOUNTER — OFFICE VISIT (OUTPATIENT)
Dept: SURGERY | Facility: CLINIC | Age: 50
End: 2025-04-08
Payer: COMMERCIAL

## 2025-04-08 VITALS
WEIGHT: 218.9 LBS | HEIGHT: 68 IN | OXYGEN SATURATION: 96 % | SYSTOLIC BLOOD PRESSURE: 111 MMHG | BODY MASS INDEX: 33.18 KG/M2 | HEART RATE: 72 BPM | DIASTOLIC BLOOD PRESSURE: 75 MMHG

## 2025-04-08 DIAGNOSIS — Z93.2 ILEOSTOMY STATUS (H): Primary | ICD-10-CM

## 2025-04-08 DIAGNOSIS — E66.812 CLASS 2 OBESITY IN ADULT, UNSPECIFIED BMI, UNSPECIFIED OBESITY TYPE, UNSPECIFIED WHETHER SERIOUS COMORBIDITY PRESENT: ICD-10-CM

## 2025-04-08 DIAGNOSIS — C19 MALIGNANT NEOPLASM OF RECTOSIGMOID JUNCTION (H): ICD-10-CM

## 2025-04-08 ASSESSMENT — PAIN SCALES - GENERAL: PAINLEVEL_OUTOF10: NO PAIN (0)

## 2025-04-08 NOTE — PATIENT INSTRUCTIONS
Follow up:    Scheduling will give you a call within three business days to schedule surgery    Appointment you will need in prep: pre op physical with our anesthesia team and blood work  You will need to complete preoperative education with a nurse today in person and two weeks before your surgery via telephone call. You will receive a handout that outlines your hospital admission expectations and the goals for you to go home. You need to bring this handout with you to the hospital.     You will be undergoing a large operation. In preparation for your surgery we ask that you focus on a healthy lifestyle to help in the aid of your recovery and to reduce your post surgical complications. Below are a few guidelines to follow:    Schedule an appointment with your primary care physician to discuss how to best improve your overall health condition   If you have diabetes, please visit with your provider to optimize your blood sugar control   Engage in 30 minutes of exercise 5x a week or to the best of your ability. This can be in the form of walking, cycling, weight lifting, running or swimming  Focus on a healthy diet, high in fiber and protein  Vegetables and fruit at every meal   Lean proteins such as fish and chicken   Please start drinking protein shakes now till the surgery. Drink 1-2 protein shakes per day. Each shake should have 25-30 grams of protein   Drink at least 64 ounces of water daily   Avoid alcohol and excessive caffeine   Stop smoking if you are currently smoking     FRANK Botello [phone]    Clinic Fax Number [phone]    Surgery Scheduling [phone]    My Chart is available 24 hours a day and is a secure way to access your records and communicate with your care team.  I strongly recommend signing up if you haven't already done so, if you are comfortable with computers.  If you would like to inquire about this or are having problems with My Chart access, you may call [phone] or go online at  [email or web address].  Please allow at least 24 hours for a response and extra time on weekends and Holidays.

## 2025-04-08 NOTE — LETTER
2025       RE: Bella Quintero  [address]  [facility] [address]     Dear Colleague,    Thank you for referring your patient, Bella Quintero, to the [facility] COLON AND RECTAL SURGERY CLINIC [place] at [facility]. Please see a copy of my visit note below.    Colon and Rectal Surgery Clinic Note    RE: Bella Quintero.  : 1975.  IVETH: 2025.    Reason for visit: follow up.    HPI: Bella Quintero is a 48 year old male who presents today for a follow up. He has a past medical history of diverticulitis, alcohol (3 bottles of wine per week) and tobacco use (current smoker). He was admitted on 2024 and 2024 for acute complicated diverticulitis. Treated with IV antibiotics with transition to PO antibiotics. Follow up CT Abdomen/Pelvis on 8/3/2024 demonstrated continued eccentric wall thickening in the sigmoid colon, findings concerning for neoplasm and unchanged prominent pelvic and portacaval lymph nodes. He presented to the ED again on 2024 for LLQ pain. He was prescribed PO cipro/flagyl. He rescheduled his colonoscopy due to a recent diverticulitis flare.      CT Abdomen/Pelvis on 10/24/2024 demonstrated continued but slightly decreased eccentric right wall thickening and enhancement of the sigmoid colon with adjacent pericolonic inflammatory changes, favoring acute on chronic diverticulitis versus sigmoid colonic neoplasm with superimposed inflammatory changes. CEA 3.0. Clinic flex on 10/29/2024 demonstrated a large, circumferential mass starting at 14 cm from the AV. He did complete a full preop colonoscopy which demonstrated the partially obstructing tumor. No other massess or polyps. He is s/p open proctectomy with DLI on 2024. Surgical Pathology demonstrated moderately differentiated adenocarcinoma, + PNI, +TN, pT3N0. He was readmitted on 2024 for a SBO and abdominal wall cellulitis treated with  ancef.      He will meet with Dr Matt Boston who recommended surveillance. GGE on 3/20/2025 demonstrated no evidence of anastomotic leak or strictures.     Interval History: Doing well, still smoking.    ORIGINAL STAGING:     CT Abdomen/Pelvis (10/24/2024):  IMPRESSION:   1. Continued but slightly decreased eccentric right wall thickening  and enhancement of the sigmoid colon with adjacent pericolonic  inflammatory changes, favoring acute on chronic diverticulitis versus  sigmoid colonic neoplasm with superimposed inflammatory changes.  2. Borderline enlarged portacaval lymph node, likely reactive.     CEA (10/24/2024):  Component      Latest Ref Rng 10/24/2024  6:36 AM   CEA      ng/mL 3.0       Colonoscopy (11/11/2024):  Findings:       The perianal and digital rectal examinations were normal.        A frond-like/villous, fungating, infiltrative, polypoid, sessile,        submucosal and ulcerated partially obstructing large mass was found in        the recto-sigmoid colon. The mass was circumferential. The mass measured        twelve cm in length. No bleeding was present. Biopsies were taken with a        cold forceps for histology. Area was tattooed with an injection of 1 mL        of Eula ink. Tumor start at 12 cm from analverge at rectosigmoid        junction.        A few small-mouthed diverticula were found in the sigmoid colon.        The terminal ileum appeared normal.        The exam was otherwise normal throughout the examined colon.                                                                                    Impression:               - Likely malignant partially obstructing tumor in                             the recto-sigmoid colon. Biopsied. Tattooed.                             - Diverticulosis in the sigmoid colon.                             - The examined portion of the ileum was normal.      Surgical Pathology (11/11/2024):  Addendum   For further evaluation, stains on block A1 (mismatch  repair (MMR) protein panel, including MLH1 clone ESO5/Optiview detection, MSH2 clone [id number]/Optiview detection, MSH6 clone [id number]/Ultraview detection, and PMS2 clone [id number]/Optiview detection) are performed with appropriate controls:     MLH1: Present  MSH2: Present  MSH6: Present  PMS2: Present     Interpretation: Normal pattern of mismatch repair (MMR) protein expression by immunohistochemical stains (low probability of MSI-H) (see comment).     COMMENT: All four DNA mismatch repair proteins are expressed in the tumor nuclei, which is the pattern of normal tissue and which strongly diminishes the likelihood of Shafer Syndrome (Hereditary Nonpolyposis Colorectal Cancer; HNPCC) due to defective DNA mismatch repair.  Microsatellite instability (MSI) testing by PCR is recommended if the patient's family history meets the Saxis guidelines or Revised Maspeth criteria for possible Shafer syndrome.  If the tumor is MSI high this could indicate a false negative IHC test (reported to occur in about 2% of tested cases) or the possibility of an abnormality in one of the other genes involved in DNA mismatch repair.  The possibility that the tumor in this patient is due to an inherited defect in another gene not involved in mismatch repair cannot be ruled out by negative results by either IHC or MSI testing.     The following assays; ALK (D5F3), ER, HER2, PD-L1, BRAF, CD20, CD30 AZF, CD30 Formalin, MLH-1, MSH-2, MSH-6 and PMS-2, have not been validated on decalcified tissues. Results should be interpreted with caution given the possibility of false negative results on decalcified specimens.         Addendum electronically signed by Cuba Garces MD on 11/14/2024 at  3:48 AM   Final Diagnosis   Large intestine, sigmoid, mass, biopsy:  - Adenocarcinoma.  - Additional stains for mismatch repair (MMR) protein expression are pending and will be reported in an addendum.      POST SURGERY:     Surgical Pathology  (11/21/2024):  Synoptic Checklist   COLON AND RECTUM: Resection   8th Edition - Protocol posted: 12/13/2023COLON AND RECTUM: RESECTION - B, C, D, E, F, G       SPECIMEN   Procedure   Low anterior resection   Macroscopic Evaluation of Mesorectum   Incomplete   TUMOR   Tumor Site   Rectum   Rectal Tumor Location   Entirely above anterior peritoneal reflection   Histologic Type   Adenocarcinoma   Histologic Grade   G2, moderately differentiated   Tumor Size   Greatest dimension (Centimeters): 9.6 cm   Additional Dimension (Centimeters)   4.1 cm       2.1 cm   Tumor Extent   Invades through muscularis propria into the pericolonic or perirectal tissue   Macroscopic Tumor Perforation   Not identified   Lymphatic and / or Vascular Invasion   Not identified   Perineural Invasion   Present   Tumor Budding Score   Low (0-4)   Type of Polyp in which Invasive Carcinoma Arose   None identified   Treatment Effect   No known presurgical therapy   MARGINS   Margin Status for Invasive Carcinoma   All margins negative for invasive carcinoma   Closest Margin(s) to Invasive Carcinoma   Radial (circumferential): 1.0   Distance from Invasive Carcinoma to Radial (Circumferential) Margin   Distance already reported as closest margin   Margin Status for Non-Invasive Tumor   All margins negative for high-grade dysplasia / intramucosal carcinoma and low-grade dysplasia   REGIONAL LYMPH NODES   Regional Lymph Node Status   All regional lymph nodes negative for tumor   Number of Lymph Nodes Examined   30   Tumor Deposits   Not identified   pTNM CLASSIFICATION (AJCC 8th Edition)   Reporting of pT, pN, and (when applicable) pM categories is based on information available to the pathologist at the time the report is issued. As per the AJCC (Chapter 1, 8th Ed.) it is the managing physician s responsibility to establish the final pathologic stage based upon all pertinent information, including but potentially not limited to this pathology report.    pT Category   pT3   pN Category   pN0   .        CEA (1/22/2025):  Component      Latest Ref Rng 1/22/2025  8:47 AM   CEA      ng/mL 1.1      GGE (3/20/2025):  Findings: The  film demonstrates a paucity of bowel gas.     A water soluble enema was performed with gastrografin via a rectal  tube under gravity. No evidence of leakage of contrast. No significant  narrowing to suggest a stricture or mass.                                                                       Impression: Postsurgical changes of proctectomy and diverting loop  ileostomy. No evidence of contrast leakage at the anastomotic site    Medications:  Current Outpatient Medications   Medication Sig Dispense Refill     ketoconazole (NIZORAL) 2 % external shampoo Apply topically daily as needed for itching or irritation 100 mL 2     acetaminophen (TYLENOL) 500 MG tablet Take 2 tablets (1,000 mg) by mouth every 6 hours. 100 tablet 0     atorvastatin (LIPITOR) 40 MG tablet Take 40 mg by mouth daily. Take half a tab daily       oxyCODONE (ROXICODONE) 5 MG tablet Take 1-2 tablets (5-10 mg) by mouth every 6 hours as needed for moderate to severe pain (take 1 tab (5mg) for moderate pain or 2tabs (10mg) for severe pain). (Patient not taking: Reported on 1/22/2025) 30 tablet 0       Allergies:  Allergies   Allergen Reactions     Codeine Swelling and Itching     PN: LW Reaction: EDEMA, GENERALIZED   Tolerated hydromorphone 5/1/24     Penicillins Other (See Comments)     Swelling, burning feeling in hands and feet. , PN: LW Reaction: EDEMA, GENERALIZED       Social history:   Social History     Tobacco Use     Smoking status: Every Day     Current packs/day: 1.00     Average packs/day: 1 pack/day for 30.7 years (30.7 ttl pk-yrs)     Types: Cigarettes     Start date: 7/9/1994     Smokeless tobacco: Never     Tobacco comments:     Revisit as pt is willing   Substance Use Topics     Alcohol use: Not Currently     Alcohol/week: 15.0 standard drinks of alcohol  "    Types: 15 Glasses of wine per week     Comment: 11/4/24. Minimal drinking currently. 3 bottles of wine per week. H/o 1L EtOH every other day, self decreased several years ago.     Marital status: .    ROS:  A complete review of systems was performed with the patient and all systems negative except as per HPI.    Physical Examination:  /75 (BP Location: Left arm, Patient Position: Sitting, Cuff Size: Adult Large)   Pulse 72   Ht 1.727 m (5' 8\")   Wt 99.3 kg (218 lb 14.4 oz)   SpO2 96%   BMI 33.28 kg/m    General: Well hydrated. No acute distress.  Abdomen: Soft, NT, obese habitus  Perianal external examination:  Perianal skin: intact.  Lesions: No.  Eversion of buttocks: There was not evidence of an anal fissure. Details: N/A.  Skin tags or external hemorrhoids: No.  Digital rectal examination: Was performed.   Sphincter tone: Good.  Palpable lesions: No.      Procedures:  Flexible sigmoidoscopy: after obtaining informed consent and performing a \"time out\", an adult flexible sigmoidoscope was introduced through the anus and passed up to the mid sigmoid colon. The quality of the prep was good. Findings: intact anastomosis with no signs of recurrence. There was no active ulceration, inflammation or bleeding. No additional abnormalities were seen. Total scope time: 10 minutes. The patient tolerated the procedure well. .    ASSESSMENT     This is a 49 year old M with a large rectosigmoid adenocarcinoma now s/p open LAR with DLI on 11/21/2024. Surgical Pathology demonstrated moderately differentiated adenocarcinoma, + PNI, +TN, pT3N0 with no recs for adjuvant treatment. GGE and flex sig show an intact anastomosis. Reversal is indicated. All questions were answered. Risks and benefits were discussed, he agreed on proceeding with surgical intervention.    All pertinent labs and imaging were personally reviewed by me.     PLAN  - To OR for ileostomy take down  - No bowel prep  - Preop teaching and " eval  - Healthy life style and weight loss were encouraged     30 minutes spent on the date of the encounter doing chart review, history and exam, imaging review, documentation and further activities as noted above, excluding the procedure.      rFandy Kauffman MD, FACS, FASCRS, FSSO    Division of Colon and Rectal Surgery  [facility]    Referring Provider:  Frandy Kauffman MD  [address] [place] [address]     Primary Care Provider:  Nyla Pastor      Again, thank you for allowing me to participate in the care of your patient.      Sincerely,    Frandy Kauffman MD

## 2025-04-08 NOTE — NURSING NOTE
"Chief Complaint   Patient presents with    Follow Up       Vitals:    04/08/25 1120   BP: 111/75   BP Location: Left arm   Patient Position: Sitting   Cuff Size: Adult Large   Pulse: 72   SpO2: 96%   Weight: 218 lb 14.4 oz   Height: 5' 8\"       Body mass index is 33.28 kg/m .    Polina Jimenez, EMT  "

## 2025-04-09 ENCOUNTER — TELEPHONE (OUTPATIENT)
Dept: SURGERY | Facility: CLINIC | Age: 50
End: 2025-04-09
Payer: COMMERCIAL

## 2025-04-09 NOTE — TELEPHONE ENCOUNTER
Called patient to schedule surgery with Dr. Kauffman    Spoke with: Jarod (patient)      Date of Surgery: 4/28/2025     Estimated Arrival time Discussed with Patient:  No    Location of surgery: [facility]     Pre-Op H&P: PAC same day as onc appts 4/22/2025 at 915am    WOC: No     Labs: Yes 4/22/2025 already scheduled     Imaging: No     Post-Op Appt Date w/ NP/PA:  RACHEL Means, CNP 5/13/2025 at 1030am    Post-Op Appt Date w/ Surgeon:  Dr. Kauffman 6/3/2025 at 130pm     Bowel Prep:  No     Discussed with patient PAC RN will provide arrival time and instructions for surgery at the time of the appointment: [[place] locations only]: Yes      Standard Surgery Packet Sent: Yes 04/09/25  via [employer] Message      Additional Comments: All patients questions were answered and was instructed to review surgical packet and call back with any questions or concerns.       Valreie Britt on 4/9/2025 at 10:21 AM

## 2025-04-14 NOTE — TELEPHONE ENCOUNTER
FUTURE VISIT INFORMATION      SURGERY INFORMATION:  Date: 25  Location: [facility] OR  Surgeon:  Frandy Kauffman MD  Anesthesia Type:  General with Block   Procedure: CLOSURE, ILEOSTOMY  Consult: 25    RECORDS REQUESTED FROM:       Primary Care Provider: Nyla Pastor MD -  [facility]     Pertinent Medical History: Hyperlipidemia; Acute respiratory failure with hypoxia; Hypotension; h/o accidental fentanyl overdose     Most recent EKG+ Tracin23

## 2025-04-17 ENCOUNTER — TEAM CONFERENCE (OUTPATIENT)
Dept: SURGERY | Facility: CLINIC | Age: 50
End: 2025-04-17
Payer: COMMERCIAL

## 2025-04-17 NOTE — PROGRESS NOTES
COLON AND RECTAL SURGERY HUDDLE:    Patient was reviewed in preparation for their surgery the following was reviewed and has been completed:    Surgeon: Dr. Frandy Kauffman    Surgery & Date: 4/28  CLOSURE, ILEOSTOMY     Last MD Note: reviewed    Anesthesia Type: General with block     Other Providers: No    PAC: Yes    WOC: N/A    Labs: Yes    Bowel Prep: No No Prep    Packet: Yes    Imaging: N/A    Post-Op Appointments: Yes    Pre op soap: yes Questions about shower: no    Is patient on TPN?: N/A   If yes, I contacted the TPN pharmacist by paging the  pharmacy at [phone] or calling [phone]. I also contacted Cookie PARKER with inpatient colon and rectal team.     Pre op call with education complete: Yes Reminded patient to bring handout to hospital Yes   Attempt 1: complete

## 2025-04-22 ENCOUNTER — ONCOLOGY VISIT (OUTPATIENT)
Dept: ONCOLOGY | Facility: CLINIC | Age: 50
End: 2025-04-22
Attending: STUDENT IN AN ORGANIZED HEALTH CARE EDUCATION/TRAINING PROGRAM
Payer: COMMERCIAL

## 2025-04-22 ENCOUNTER — PRE VISIT (OUTPATIENT)
Dept: SURGERY | Facility: CLINIC | Age: 50
End: 2025-04-22

## 2025-04-22 ENCOUNTER — OFFICE VISIT (OUTPATIENT)
Dept: SURGERY | Facility: CLINIC | Age: 50
End: 2025-04-22
Payer: COMMERCIAL

## 2025-04-22 ENCOUNTER — ANESTHESIA EVENT (OUTPATIENT)
Dept: SURGERY | Facility: CLINIC | Age: 50
End: 2025-04-22
Payer: COMMERCIAL

## 2025-04-22 VITALS
OXYGEN SATURATION: 95 % | BODY MASS INDEX: 33.06 KG/M2 | SYSTOLIC BLOOD PRESSURE: 100 MMHG | TEMPERATURE: 98 F | WEIGHT: 217.4 LBS | DIASTOLIC BLOOD PRESSURE: 70 MMHG | RESPIRATION RATE: 18 BRPM | HEART RATE: 65 BPM

## 2025-04-22 VITALS
BODY MASS INDEX: 32.86 KG/M2 | TEMPERATURE: 98.3 F | SYSTOLIC BLOOD PRESSURE: 107 MMHG | HEIGHT: 68 IN | DIASTOLIC BLOOD PRESSURE: 74 MMHG | HEART RATE: 67 BPM | OXYGEN SATURATION: 98 % | WEIGHT: 216.8 LBS | RESPIRATION RATE: 16 BRPM

## 2025-04-22 DIAGNOSIS — Z93.2 ILEOSTOMY STATUS (H): ICD-10-CM

## 2025-04-22 DIAGNOSIS — C19 MALIGNANT NEOPLASM OF RECTOSIGMOID JUNCTION (H): ICD-10-CM

## 2025-04-22 DIAGNOSIS — Z01.818 PRE-OP EVALUATION: Primary | ICD-10-CM

## 2025-04-22 LAB
ALBUMIN SERPL BCG-MCNC: 4.2 G/DL (ref 3.5–5.2)
ALP SERPL-CCNC: 113 U/L (ref 40–150)
ALT SERPL W P-5'-P-CCNC: 24 U/L (ref 0–70)
ANION GAP SERPL CALCULATED.3IONS-SCNC: 10 MMOL/L (ref 7–15)
AST SERPL W P-5'-P-CCNC: 20 U/L (ref 0–45)
BASOPHILS # BLD AUTO: 0.2 10E3/UL (ref 0–0.2)
BASOPHILS NFR BLD AUTO: 2 %
BILIRUB SERPL-MCNC: 0.4 MG/DL
BUN SERPL-MCNC: 14.9 MG/DL (ref 6–20)
CALCIUM SERPL-MCNC: 9.1 MG/DL (ref 8.8–10.4)
CEA SERPL-MCNC: 1.8 NG/ML
CHLORIDE SERPL-SCNC: 107 MMOL/L (ref 98–107)
CREAT SERPL-MCNC: 0.94 MG/DL (ref 0.67–1.17)
EGFRCR SERPLBLD CKD-EPI 2021: >90 ML/MIN/1.73M2
EOSINOPHIL # BLD AUTO: 0.4 10E3/UL (ref 0–0.7)
EOSINOPHIL NFR BLD AUTO: 4 %
ERYTHROCYTE [DISTWIDTH] IN BLOOD BY AUTOMATED COUNT: 14.1 % (ref 10–15)
GLUCOSE SERPL-MCNC: 113 MG/DL (ref 70–99)
HCO3 SERPL-SCNC: 22 MMOL/L (ref 22–29)
HCT VFR BLD AUTO: 49.2 % (ref 40–53)
HGB BLD-MCNC: 16.2 G/DL (ref 13.3–17.7)
IMM GRANULOCYTES # BLD: 0.1 10E3/UL
IMM GRANULOCYTES NFR BLD: 1 %
LYMPHOCYTES # BLD AUTO: 2.2 10E3/UL (ref 0.8–5.3)
LYMPHOCYTES NFR BLD AUTO: 19 %
MCH RBC QN AUTO: 30.2 PG (ref 26.5–33)
MCHC RBC AUTO-ENTMCNC: 32.9 G/DL (ref 31.5–36.5)
MCV RBC AUTO: 92 FL (ref 78–100)
MONOCYTES # BLD AUTO: 0.7 10E3/UL (ref 0–1.3)
MONOCYTES NFR BLD AUTO: 6 %
NEUTROPHILS # BLD AUTO: 7.7 10E3/UL (ref 1.6–8.3)
NEUTROPHILS NFR BLD AUTO: 69 %
NRBC # BLD AUTO: 0 10E3/UL
NRBC BLD AUTO-RTO: 0 /100
PLATELET # BLD AUTO: 264 10E3/UL (ref 150–450)
POTASSIUM SERPL-SCNC: 4.7 MMOL/L (ref 3.4–5.3)
PREALB SERPL-MCNC: 32.2 MG/DL (ref 20–40)
PROT SERPL-MCNC: 7.3 G/DL (ref 6.4–8.3)
RBC # BLD AUTO: 5.37 10E6/UL (ref 4.4–5.9)
SODIUM SERPL-SCNC: 139 MMOL/L (ref 135–145)
WBC # BLD AUTO: 11.2 10E3/UL (ref 4–11)

## 2025-04-22 PROCEDURE — 85004 AUTOMATED DIFF WBC COUNT: CPT | Performed by: PHYSICIAN ASSISTANT

## 2025-04-22 PROCEDURE — 82947 ASSAY GLUCOSE BLOOD QUANT: CPT | Performed by: PHYSICIAN ASSISTANT

## 2025-04-22 PROCEDURE — 99214 OFFICE O/P EST MOD 30 MIN: CPT | Performed by: PHYSICIAN ASSISTANT

## 2025-04-22 PROCEDURE — G0463 HOSPITAL OUTPT CLINIC VISIT: HCPCS | Performed by: PHYSICIAN ASSISTANT

## 2025-04-22 PROCEDURE — 99213 OFFICE O/P EST LOW 20 MIN: CPT | Performed by: PHYSICIAN ASSISTANT

## 2025-04-22 PROCEDURE — 36415 COLL VENOUS BLD VENIPUNCTURE: CPT | Performed by: PHYSICIAN ASSISTANT

## 2025-04-22 PROCEDURE — 84155 ASSAY OF PROTEIN SERUM: CPT | Performed by: PHYSICIAN ASSISTANT

## 2025-04-22 PROCEDURE — 82378 CARCINOEMBRYONIC ANTIGEN: CPT | Performed by: PHYSICIAN ASSISTANT

## 2025-04-22 PROCEDURE — 84134 ASSAY OF PREALBUMIN: CPT | Performed by: PHYSICIAN ASSISTANT

## 2025-04-22 ASSESSMENT — LIFESTYLE VARIABLES: TOBACCO_USE: 1

## 2025-04-22 ASSESSMENT — PAIN SCALES - GENERAL
PAINLEVEL_OUTOF10: NO PAIN (0)
PAINLEVEL_OUTOF10: NO PAIN (0)

## 2025-04-22 NOTE — PATIENT INSTRUCTIONS
"Preparing for Your Surgery      Name:  Bella Quintero \"[place]\"  MRN:  [id number]   :  1975   Today's Date:  2025     The [place] [employer] [address] Construction Project                                Timeline 2025 -2025    This project will affect travel to the [facility] and [facility], as well as the [facility].      Please check the [facility] [address] project website for the most up to date information and give yourself additional time to reach your destination.        Arriving for surgery:  Surgery date:  25  Arrival time:  9:20 am  Surgery time: 11:20 am    Please come to:     Please come to:       [facility]    [address] [place]  [address]     The [facility] [place] Patient/Visitor Ramp is at [address]. Patients and visitors who self-park will receive the reduced hospital parking rate. If the Patient /Visitor Ramp is full, please follow the signs to the [place] car park located at the [facility] entrance.       parking is available (24 hours/ 7 days a week)      Discounted parking pass options are available for patients and visitors. They can be purchased at the Track the Bet desk at the [facility] entrance.     -    Stop at the security desk and they will direct surgery patients to the Surgery Check in and Family Lounge. [phone]        - If you need directions, a wheelchair or an escort please stop at the Information/security desk in the lobby.     What can I eat or drink?  -  You may eat and drink normally up to 8 hours prior to arrival time. (Until 1:20 am on 25)  -  You may have clear liquids until 2 hours prior to arrival time. (Until 7:20 am on 25)    Examples of clear liquids:  Water  Clear broth  Juices (apple, white grape, white cranberry  and cider) without " pulp  Noncarbonated, powder based beverages  (lemonade and Chip-Aid)  Sodas (Sprite, 7-Up, ginger ale and seltzer)  Coffee or tea (without milk or cream)  Gatorade    -  No Alcohol or cannabis products for at least 24 hours before surgery.     Which medicines can I take?    Hold Ibuprofen (Advil, Motrin) for 1 day(s) before surgery--unless otherwise directed by surgeon.  Hold Naproxen (Aleve) for 4 days before surgery.    Ok to take Acetaminophen (Tylenol) as needed    How do I prepare myself?  - Please take 2 showers (one the night prior to surgery and one the morning of surgery) using Scrubcare or Hibiclens soap.    Use this soap only from the neck to your toes. Avoid genital area      Leave the soap on your skin for one minute--then rinse thoroughly.      You may use your own shampoo and conditioner. No other hair products.   - Please remove all jewelry and body piercings.  - No lotions, deodorants or fragrance.  - Bring your ID and insurance card.    -For patients being admitted to the [facility]  Family members are to take the patient belongings with them and place them in the lockers provided in the [facility] Lounge.  Please limit the items you bring to 1 bag as the lockers are small.      -If you use a CPAP machine, please bring the CPAP machine, tubing, and mask to hospital.    -If you have a Deep Brain Stimulator, Spinal Cord Stimulator, or any Neuro Stimulator device---you must bring the remote control to the hospital.        Covid testing policy as of 12/06/2022  Your surgeon will notify and schedule you for a COVID test if one is needed before surgery--please direct any questions or COVID symptoms to your surgeon      Questions or Concerns:    - For any questions regarding the day of surgery or your hospital stay, please contact the Pre Admission Nursing Office at [phone].       - If you have health changes between today and your surgery, please call your surgeon.       - For questions after  surgery, please call your surgeons office.           Current Visitor Guidelines    2 adult visitors for adult patients in the pre op area    If additional visitors come (beyond a patient care attendant or a group home caregiver), the additional visitors will be asked to wait in the main lobby of the hospital    Visiting hours: 8 a.m. to 8:30 p.m.    Patients confirmed or suspected to have symptoms of COVID 19 or flu:     No visitors allowed for adult patients.   Children (under age 18) can have 1 named visitor.     People who are sick or showing symptoms of COVID 19 or flu:    Are not allowed to visit patients--we can only make exceptions in special situations.       Please follow these guidelines for your visit:          Please maintain social distance          Masking is optional--however at times you may be asked to wear a mask for the safety of yourself and others     Clean your hands with alcohol hand . Do this when you arrive at and leave the building and patient room,    And again after you touch your mask or anything in the room.     Go directly to and from the room you are visiting.     Stay in the patient s room during your visit. Limit going to other places in the hospital as much as possible     Leave bags and jackets at home or in the car.     For everyone s health, please don t come and go during your visit. That includes for smoking   during your visit.

## 2025-04-22 NOTE — NURSING NOTE
Chief Complaint   Patient presents with    Blood Draw     Vitals, blood draw, vpt by MA. Pt checked into appt.     Chani Collins MA

## 2025-04-22 NOTE — PROGRESS NOTES
Oncology/Hematology Visit Note  Apr 22, 2025    Reason for Visit: follow up of rectosigmoid adenocarcinoma    History of Present Illness: Bella Quintero is a 49 year old male with a history of rectosigmoid adenocarcinoma. This was diagnosed incidentally discovered stage II rectosigmoid cancer. He was undergoing workup and management of his diverticulitis, with planned resection when they discovered a rectosigmoid mass, which was biopsy confirmed adenocarcinoma. He underwent resection on 11/21/24 which revealed T3 N0 disease. Please see previous notes for further details on the patient's history. He comes in today for routine follow up.    Interval History:  Patient reports that overall he is doing well.  He is looking forward to getting his ileostomy reversed next week.  He continues to have soft stools.  He denies any blood in his stools.  He reports his energy has generally been doing okay though he has been a bit more tired for the last few days.  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  He feels this is going okay.  He denies any abdominal pain other than some discomfort when his ostomy bag rubs against his waistline of his pants.  He denies other concerns.    Current Outpatient Medications   Medication Sig Dispense Refill    ketoconazole (NIZORAL) 2 % external shampoo Apply topically daily as needed for itching or irritation 100 mL 2     Physical Examination:  General: The patient is a pleasant male in no acute distress.  /70   Pulse 65   Temp 98  F (36.7  C) (Oral)   Resp 18   Wt 98.6 kg (217 lb 6.4 oz)   SpO2 95%   BMI 33.06 kg/m    Wt Readings from Last 10 Encounters:   04/22/25 98.6 kg (217 lb 6.4 oz)   04/22/25 98.3 kg (216 lb 12.8 oz)   04/08/25 99.3 kg (218 lb 14.4 oz)   01/22/25 100.7 kg (222 lb)   12/09/24 97.1 kg (214 lb)   11/25/24 101.5 kg (223 lb 11.2 oz)   11/11/24 104.8 kg (231 lb)   11/04/24 105 kg (231 lb 8 oz)   10/29/24 104.6 kg  (230 lb 9.6 oz)   10/13/24 104.3 kg (230 lb)   HEENT: EOMI. Sclerae are anicteric.  Lymph: Neck is supple with no lymphadenopathy in the cervical or supraclavicular areas.   Heart: Regular rate and rhythm.   Lungs: Clear to auscultation bilaterally.   Abdomen: Bowel sounds present, soft, nontender with no palpable hepatosplenomegaly or masses. Ostomy bag is in place in right lower abdomen with soft liquid brown stools. Ostomy is pink.   Extremities: No lower extremity edema noted bilaterally.   Neuro: Cranial nerves II through XII are grossly intact. Able to get on and off of the exam table without difficulty.  Skin: No rashes, petechiae, or bruising noted on exposed skin.    Laboratory Data:  Most Recent 3 CBC's:  Recent Labs   Lab Test 04/22/25  1008 01/22/25  0847 12/10/24  0430   WBC 11.2* 10.6 7.5   HGB 16.2 13.6 11.4*   MCV 92 93 94    316 412    Most Recent 3 BMP's:  Recent Labs   Lab Test 04/22/25  1008 01/22/25  0847 12/10/24  0746 12/09/24  0738 12/09/24  0551 12/07/24  1315 12/07/24  0706    138  --   --  136   < > 137   POTASSIUM 4.7 4.2  --   --  4.4   < > 4.0   CHLORIDE 107 104  --   --  103   < > 101   CO2 22 22  --   --  23   < > 27   BUN 14.9 15.6  --   --  13.6   < > 16.0   CR 0.94 0.81  --   --  0.80   < > 0.82   ANIONGAP 10 12  --   --  10   < > 9   NORMA 9.1 9.1  --   --  8.8   < > 8.4*   * 106* 116*   < > 118*   < > 112*   PROTTOTAL 7.3 7.1  --   --   --   --  5.9*   ALBUMIN 4.2 4.2  --   --   --   --  3.3*    < > = values in this interval not displayed.    Most Recent 2 LFT's:  Recent Labs   Lab Test 04/22/25  1008 01/22/25  0847   AST 20 23   ALT 24 46   ALKPHOS 113 119   BILITOTAL 0.4 0.2   I reviewed the above labs today.    Assessment and Plan:  Stage IIA sigmoid colon cancer s/p resection (11/21/2024). Patient is doing well today with no concerning signs/symptoms of recurrent disease. He will have his ostomy reversed on 4/28/25. He will follow-up with Dr. Craig in July  with labs and repeat imaging. He will call sooner for concerns.       Health care maintenance.  Eye exams: Recommend exams at least every 2 years. Recommend scheduling.  Dental exams: Recommend exams and cleanings every 6 months. Recommend scheduling.  Primary care: Recommend follow up at least annually. Recommend scheduling.  Skin care: Recommend the use of sunscreen with SPF of at least 30, reapplying every 2 hours with prolonged sun exposure.   Tobacco use: Recommend abstaining. Cutting down, now down to 3 cigarettes/day. Encouraged abstinence as the best way to reduce his cancer recurrence risk.  Alcohol use: Recommend no more than 2/day for men. Denies use.  Physical activity: Recommend regular activity, ideally 150 minutes/week of moderate intensity activity. Goes for walks about twice/week for about 20-30 minutes.     Ashley Guerra PA-C  [facility]  [address] [place] [address]  [phone]

## 2025-04-22 NOTE — LETTER
4/22/2025      Bella Quintero  [address]  [facility] [address]      Dear Colleague,    Thank you for referring your patient, Bella Quintero, to the [facility]. Please see a copy of my visit note below.    Oncology/Hematology Visit Note  Apr 22, 2025    Reason for Visit: follow up of rectosigmoid adenocarcinoma    History of Present Illness: Bella Quintero is a 49 year old male with a history of rectosigmoid adenocarcinoma. This was diagnosed incidentally discovered stage II rectosigmoid cancer. He was undergoing workup and management of his diverticulitis, with planned resection when they discovered a rectosigmoid mass, which was biopsy confirmed adenocarcinoma. He underwent resection on 11/21/24 which revealed T3 N0 disease. Please see previous notes for further details on the patient's history. He comes in today for routine follow up.    Interval History:  Patient reports that overall he is doing well.  He is looking forward to getting his ileostomy reversed next week.  He continues to have soft stools.  He denies any blood in his stools.  He reports his energy has generally been doing okay though he has been a bit more tired for the last few days.  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  He feels this is going okay.  He denies any abdominal pain other than some discomfort when his ostomy bag rubs against his waistline of his pants.  He denies other concerns.    Current Outpatient Medications   Medication Sig Dispense Refill     ketoconazole (NIZORAL) 2 % external shampoo Apply topically daily as needed for itching or irritation 100 mL 2     Physical Examination:  General: The patient is a pleasant male in no acute distress.  /70   Pulse 65   Temp 98  F (36.7  C) (Oral)   Resp 18   Wt 98.6 kg (217 lb 6.4 oz)   SpO2 95%   BMI 33.06 kg/m    Wt Readings from Last 10 Encounters:   04/22/25 98.6 kg (217 lb  6.4 oz)   04/22/25 98.3 kg (216 lb 12.8 oz)   04/08/25 99.3 kg (218 lb 14.4 oz)   01/22/25 100.7 kg (222 lb)   12/09/24 97.1 kg (214 lb)   11/25/24 101.5 kg (223 lb 11.2 oz)   11/11/24 104.8 kg (231 lb)   11/04/24 105 kg (231 lb 8 oz)   10/29/24 104.6 kg (230 lb 9.6 oz)   10/13/24 104.3 kg (230 lb)   HEENT: EOMI. Sclerae are anicteric.  Lymph: Neck is supple with no lymphadenopathy in the cervical or supraclavicular areas.   Heart: Regular rate and rhythm.   Lungs: Clear to auscultation bilaterally.   Abdomen: Bowel sounds present, soft, nontender with no palpable hepatosplenomegaly or masses. Ostomy bag is in place in right lower abdomen with soft liquid brown stools. Ostomy is pink.   Extremities: No lower extremity edema noted bilaterally.   Neuro: Cranial nerves II through XII are grossly intact. Able to get on and off of the exam table without difficulty.  Skin: No rashes, petechiae, or bruising noted on exposed skin.    Laboratory Data:  Most Recent 3 CBC's:  Recent Labs   Lab Test 04/22/25  1008 01/22/25  0847 12/10/24  0430   WBC 11.2* 10.6 7.5   HGB 16.2 13.6 11.4*   MCV 92 93 94    316 412    Most Recent 3 BMP's:  Recent Labs   Lab Test 04/22/25  1008 01/22/25  0847 12/10/24  0746 12/09/24  0738 12/09/24  0551 12/07/24  1315 12/07/24  0706    138  --   --  136   < > 137   POTASSIUM 4.7 4.2  --   --  4.4   < > 4.0   CHLORIDE 107 104  --   --  103   < > 101   CO2 22 22  --   --  23   < > 27   BUN 14.9 15.6  --   --  13.6   < > 16.0   CR 0.94 0.81  --   --  0.80   < > 0.82   ANIONGAP 10 12  --   --  10   < > 9   NORMA 9.1 9.1  --   --  8.8   < > 8.4*   * 106* 116*   < > 118*   < > 112*   PROTTOTAL 7.3 7.1  --   --   --   --  5.9*   ALBUMIN 4.2 4.2  --   --   --   --  3.3*    < > = values in this interval not displayed.    Most Recent 2 LFT's:  Recent Labs   Lab Test 04/22/25  1008 01/22/25  0847   AST 20 23   ALT 24 46   ALKPHOS 113 119   BILITOTAL 0.4 0.2   I reviewed the above labs  today.    Assessment and Plan:  Stage IIA sigmoid colon cancer s/p resection (11/21/2024). Patient is doing well today with no concerning signs/symptoms of recurrent disease. He will have his ostomy reversed on 4/28/25. He will follow-up with Dr. Craig in July with labs and repeat imaging. He will call sooner for concerns.       Health care maintenance.  Eye exams: Recommend exams at least every 2 years. Recommend scheduling.  Dental exams: Recommend exams and cleanings every 6 months. Recommend scheduling.  Primary care: Recommend follow up at least annually. Recommend scheduling.  Skin care: Recommend the use of sunscreen with SPF of at least 30, reapplying every 2 hours with prolonged sun exposure.   Tobacco use: Recommend abstaining. Cutting down, now down to 3 cigarettes/day. Encouraged abstinence as the best way to reduce his cancer recurrence risk.  Alcohol use: Recommend no more than 2/day for men. Denies use.  Physical activity: Recommend regular activity, ideally 150 minutes/week of moderate intensity activity. Goes for walks about twice/week for about 20-30 minutes.     Ashley Guerra PA-C  [facility]  [address] [place] [id number]  [phone]    Again, thank you for allowing me to participate in the care of your patient.        Sincerely,        Ashley Guerra PA-C    Electronically signed

## 2025-04-22 NOTE — NURSING NOTE
"Oncology Rooming Note    April 22, 2025 10:46 AM   Bella Quintero is a 49 year old male who presents for:    Chief Complaint   Patient presents with    Blood Draw     Vitals, blood draw, vpt by MA. Pt checked into appt.    Oncology Clinic Visit     Colon cancer     Initial Vitals: /70   Pulse 65   Temp 98  F (36.7  C) (Oral)   Resp 18   Wt 98.6 kg (217 lb 6.4 oz)   SpO2 95%   BMI 33.06 kg/m   Estimated body mass index is 33.06 kg/m  as calculated from the following:    Height as of an earlier encounter on 4/22/25: 1.727 m (5' 8\").    Weight as of this encounter: 98.6 kg (217 lb 6.4 oz). Body surface area is 2.17 meters squared.  No Pain (0) Comment: Data Unavailable   No LMP for male patient.  Allergies reviewed: Yes  Medications reviewed: Yes    Medications: Medication refills not needed today.  Pharmacy name entered into [employer]:    [facility] [place], [place] - [address] [place] [address] [phone]  [id number] - [place], [place] - [address] [place] [address]    Frailty Screening:   Is the patient here for a new oncology consult visit in cancer care? 2. No    PHQ9:  Did this patient require a PHQ9?: No      Clinical concerns: none.      Valentino Jaime            "

## 2025-04-22 NOTE — H&P
Pre-Operative H & P     CC:  Preoperative exam to assess for increased cardiopulmonary risk while undergoing surgery and anesthesia.    Date of Encounter: 4/22/2025  Primary Care Physician:  Nyla Pastor     Reason for visit:   Encounter Diagnoses   Name Primary?    Pre-op evaluation Yes    Ileostomy status (H)        HPI  Bella Quintero is a 49 year old male who presents for pre-operative H & P in preparation for  Procedure Information       Case: [id number] Date/Time: 04/28/25 1120    Procedure: CLOSURE, ILEOSTOMY (Abdomen)    Anesthesia type: General with Block    Diagnosis: Ileostomy status (H) [Z93.2]    Pre-op diagnosis: Ileostomy status (H) [Z93.2]    Location:  OR  /  OR    Providers: Frandy Kauffman MD            Patient is being evaluated for comorbid conditions of obesity, diverticulitis, alcohol use disorder, and tobacco use.    He has a history of moderately differentiated adenocarcinoma.  He is status post open LAR with diverting loop ileostomy in November 2024.  His postoperative course was notable for SBO abdominal wall cellulitis requiring admission in December 2024.  He was treated with antibiotics.  He recently completed follow-up with oncology and colorectal surgery.  Surveillance has been recommended for his colon cancer and a Gastrografin enema completed in March 2025 demonstrated no evidence of anastomotic leak or stricture.  He is now scheduled for ileostomy takedown as above.    History is obtained from the patient and chart review    Hx of abnormal bleeding or anti-platelet use: Denies      Past Medical History  Past Medical History:   Diagnosis Date    Bilateral low back pain with right-sided sciatica     Diverticulitis of colon 05/01/2024    History of alcohol use disorder     HLD (hyperlipidemia)     Obesity     Tobacco use disorder        Past Surgical History  Past Surgical History:   Procedure Laterality Date    COLONOSCOPY N/A 11/11/2024    Procedure: COLONOSCOPY, WITH  POLYPECTOMY AND BIOPSY;  Surgeon: Ranjan Verdin MD;  Location: [facility]    DAVINCI ASSISTED RESECTION LOW ANTERIOR N/A 11/21/2024    Procedure: ROBOT-ASSISTED, CONVERTED TO OPEN, PROCTECTOMY, DIVERTING LOOP ILEOSTOMY; repair of umbilical hernia;  Surgeon: Frandy Kauffman MD;  Location: [facility]    INSERT STENT URETER Bilateral 11/21/2024    Procedure: Cystoscopy, Temporary Insert Stent Bilateral Ureter;  Surgeon: Xavier Art MD;  Location: [facility]    RESECTION ABDOMINAL PERINEAL N/A 11/21/2024    Procedure: OPEN PROCTECTOMY, DIVERTING LOOP ILEOSTOMY; repair of umbilical hernia;  Surgeon: Frandy Kauffman MD;  Location: [facility]    SIGMOIDOSCOPY FLEXIBLE N/A 11/21/2024    Procedure: Sigmoidoscopy flexible;  Surgeon: Frandy Kauffman MD;  Location: [facility] OR       Prior to Admission Medications  Current Outpatient Medications   Medication Sig Dispense Refill    ketoconazole (NIZORAL) 2 % external shampoo Apply topically daily as needed for itching or irritation 100 mL 2       Allergies  Allergies   Allergen Reactions    Codeine Swelling and Itching     PN: LW Reaction: EDEMA, GENERALIZED   Tolerated hydromorphone 5/1/24    Penicillins Other (See Comments)     Swelling, burning feeling in hands and feet. , PN: LW Reaction: EDEMA, GENERALIZED       Social History  Social History     Socioeconomic History    Marital status:      Spouse name: Not on file    Number of children: Not on file    Years of education: Not on file    Highest education level: Not on file   Occupational History    Not on file   Tobacco Use    Smoking status: Every Day     Current packs/day: 1.00     Average packs/day: 1 pack/day for 30.8 years (30.8 ttl pk-yrs)     Types: Cigarettes     Start date: 7/9/1994    Smokeless tobacco: Never    Tobacco comments:     4/22/25 working on quitting, down to 3 cigarettes daily   Substance and Sexual Activity    Alcohol use: Not Currently     Alcohol/week: 15.0 standard drinks of alcohol     Types: 15 Glasses  of wine per week     Comment: Last alcohol months ago as of 4/22/25    Drug use: Yes     Types: Marijuana     Comment: Daily    Sexual activity: Not on file   Other Topics Concern    Not on file   Social History Narrative    Not on file     Social Drivers of Health     Financial Resource Strain: Low Risk  (12/7/2024)    Financial Resource Strain     Within the past 12 months, have you or your family members you live with been unable to get utilities (heat, electricity) when it was really needed?: No   Food Insecurity: Low Risk  (12/7/2024)    Food Insecurity     Within the past 12 months, did you worry that your food would run out before you got money to buy more?: No     Within the past 12 months, did the food you bought just not last and you didn t have money to get more?: No   Transportation Needs: Low Risk  (12/7/2024)    Transportation Needs     Within the past 12 months, has lack of transportation kept you from medical appointments, getting your medicines, non-medical meetings or appointments, work, or from getting things that you need?: No   Physical Activity: Not on file   Stress: Not on file   Social Connections: Not on file   Interpersonal Safety: Low Risk  (12/7/2024)    Interpersonal Safety     Do you feel physically and emotionally safe where you currently live?: Yes     Within the past 12 months, have you been hit, slapped, kicked or otherwise physically hurt by someone?: No     Within the past 12 months, have you been humiliated or emotionally abused in other ways by your partner or ex-partner?: No   Housing Stability: Low Risk  (12/7/2024)    Housing Stability     Do you have housing? : Yes     Are you worried about losing your housing?: No       Family History  Family History   Problem Relation Age of Onset    Colon Polyps Mother     Alcoholism Father     Diabetes Father     Colon Cancer No family hx of     Anesthesia Reaction No family hx of     Deep Vein Thrombosis (DVT) No family hx of      "Clotting Disorder No family hx of        Review of Systems  The complete review of systems is negative other than noted in the HPI or here.   Anesthesia Evaluation   Pt has had prior anesthetic. Type: General.    No history of anesthetic complications       ROS/MED HX  ENT/Pulmonary:     (+)     BETI risk factors, snores loudly,          tobacco use, Current use,                    (-) asthma and recent URI   Neurologic:  - neg neurologic ROS     Cardiovascular: Comment: Denies chest pain/pressure, DOWNING, orthopnea, dizziness, palpitations, edema.     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    Infectious Disease: Comment: History of abdominal wall cellulitis 12/2024   (-) Recent Fever   Malignancy:   (+) Malignancy, History of GI.GI CA  Remission status post Surgery.      Other:  - neg other ROS          /74 (BP Location: Right arm, Patient Position: Sitting, Cuff Size: Adult Regular)   Pulse 67   Temp 98.3  F (36.8  C) (Oral)   Resp 16   Ht 1.727 m (5' 8\")   Wt 98.3 kg (216 lb 12.8 oz)   SpO2 98%   BMI 32.96 kg/m      Physical Exam   Constitutional: Pleasant, well-appearing, no apparent distress, and appears stated age.  Eyes: Pupils equal, round and reactive to light, extra ocular " muscles intact, sclera clear, conjunctiva normal.  HENT: Normocephalic and atraumatic, oral pharynx with moist mucus membranes, poor dentition. No goiter appreciated.   Respiratory: Clear to auscultation bilaterally, no crackles or wheezing.  Cardiovascular: Regular rate and rhythm, normal S1 and S2, and no murmur noted.  Carotids +2, no bruits. No edema. Palpable pulses to radial arteries.   GI: Nondistended abdomen, ileostomy present  Lymph/Hematologic: No cervical lymphadenopathy and no supraclavicular lymphadenopathy.  Genitourinary:  Deferred  Skin: Warm and dry.  No rashes on exposed skin   Musculoskeletal: Full ROM of neck. There is no redness, warmth, or swelling of the visible joints. Gross motor strength is normal.    Neurologic: Awake, alert, oriented to name, place and time. Cranial nerves II-XII are grossly intact. Gait is normal.   Neuropsychiatric: Calm, cooperative. Normal affect.       Prior Labs/Diagnostic Studies   All labs and imaging personally reviewed     EKG/ stress test - if available please see in ROS above   No results found.       No data to display                  The patient's records and results personally reviewed by this provider.     Outside records reviewed from: Care Everywhere    LAB/DIAGNOSTIC STUDIES TODAY:  Per Dr. Kauffman    Assessment  Bella Quintero is a 49 year old male seen as a PAC referral for risk assessment and optimization for anesthesia.    Plan/Recommendations  Pt will be optimized for the proposed procedure.  See below for details on the assessment, risk, and preoperative recommendations    NEUROLOGY  - No history of TIA, CVA or seizure    -Post Op delirium risk factors:  No risk identified    ENT  - No current airway concerns.  Will need to be reassessed day of surgery.  Mallampati: I  TM: > 3    CARDIAC  - No history of CAD, Hypertension, and Afib  - Hyperlipidemia  Not currently on medications  -Patient denies cardiac history or cardiac symptoms today  -  "METS (Metabolic Equivalents)  Patient performs 4 or more METS exercise without symptoms             Total Score: 0      RCRI-Very low risk: Class 1 0.4% complication rate             Total Score: 0        PULMONARY  - Obstructive Sleep Apnea  BETI risk with no formal diagnosis  Patient reports improved sleep quality since he quit drinking and has lost some weight.    BETI Medium Risk             Total Score: 4    BETI: Snores loudly    BETI: BMI over 35 kg/m2    BETI: Over 50 ys old    BETI: Male      - Denies asthma or inhaler use  - Tobacco History    History   Smoking Status    Every Day    Types: Cigarettes   Smokeless Tobacco    Never   He continues to work on smoking cessation and is now down to 3 cigarettes daily.  Patient was encouraged to continue to reduce tobacco use leading up to surgery.    GI  -History of complicated diverticulitis  - History of small bowel obstruction  -Colon cancer  Status post LAR with diverting loop ileostomy, now scheduled for ileostomy takedown as above.    PONV Low Risk  Total Score: 1           1 AN PONV: Intended Post Op Opioids        /RENAL  - Baseline Creatinine  0.81    ENDOCRINE    - BMI: Estimated body mass index is 32.96 kg/m  as calculated from the following:    Height as of this encounter: 1.727 m (5' 8\").    Weight as of this encounter: 98.3 kg (216 lb 12.8 oz).  Obesity (BMI >30)  - No history of Diabetes Mellitus    HEME  VTE Low Risk 0.5%             Total Score: 2    VTE: Male      - No history of abnormal bleeding or antiplatelet use.      MSK  Patient is NOT Frail             Total Score: 0          PSYCH/SUBSTANCE  - Alcohol use disorder  Patient reports that he has abstained from alcohol use for the last several months.  - Marijuana use  Patient reports daily use of cannabis products.  He was encouraged to abstain for 24 hours prior to surgery.    Different anesthesia methods/types have been discussed with the patient, but they are aware that the final plan will " be decided by the assigned anesthesia provider on the date of service.    The patient is optimized for their procedure. AVS with information on surgery time/arrival time, meds and NPO status given by nursing staff. No further diagnostic testing indicated.      On the day of service:     Prep time: 10 minutes  Visit time: 10 minutes  Documentation time: 10 minutes  ------------------------------------------  Total time: 30 minutes      Angela Poon PA-C  Preoperative Assessment Center  [facility]  [facility]  Phone: [phone]  Fax: [phone]

## 2025-04-28 ENCOUNTER — HOSPITAL ENCOUNTER (INPATIENT)
Facility: CLINIC | Age: 50
LOS: 3 days | Discharge: HOME | End: 2025-05-01
Attending: SURGERY | Admitting: SURGERY
Payer: COMMERCIAL

## 2025-04-28 ENCOUNTER — ANESTHESIA (OUTPATIENT)
Dept: SURGERY | Facility: CLINIC | Age: 50
End: 2025-04-28
Payer: COMMERCIAL

## 2025-04-28 DIAGNOSIS — G89.18 ACUTE POST-OPERATIVE PAIN: Primary | ICD-10-CM

## 2025-04-28 PROBLEM — Z98.890 HX OF ILEOSTOMY: Status: ACTIVE | Noted: 2025-04-28

## 2025-04-28 LAB — GLUCOSE BLDC GLUCOMTR-MCNC: 120 MG/DL (ref 70–99)

## 2025-04-28 PROCEDURE — 250N000009 HC RX 250

## 2025-04-28 PROCEDURE — 370N000017 HC ANESTHESIA TECHNICAL FEE, PER MIN: Performed by: SURGERY

## 2025-04-28 PROCEDURE — 120N000002 HC R&B MED SURG/OB UMMC

## 2025-04-28 PROCEDURE — 250N000011 HC RX IP 250 OP 636: Mod: JZ | Performed by: ANESTHESIOLOGY

## 2025-04-28 PROCEDURE — 250N000011 HC RX IP 250 OP 636: Mod: JZ

## 2025-04-28 PROCEDURE — 258N000003 HC RX IP 258 OP 636

## 2025-04-28 PROCEDURE — 250N000013 HC RX MED GY IP 250 OP 250 PS 637: Performed by: SURGERY

## 2025-04-28 PROCEDURE — 88304 TISSUE EXAM BY PATHOLOGIST: CPT | Mod: TC | Performed by: SURGERY

## 2025-04-28 PROCEDURE — 88304 TISSUE EXAM BY PATHOLOGIST: CPT | Mod: 26 | Performed by: STUDENT IN AN ORGANIZED HEALTH CARE EDUCATION/TRAINING PROGRAM

## 2025-04-28 PROCEDURE — 710N000010 HC RECOVERY PHASE 1, LEVEL 2, PER MIN: Performed by: SURGERY

## 2025-04-28 PROCEDURE — 258N000003 HC RX IP 258 OP 636: Performed by: SURGERY

## 2025-04-28 PROCEDURE — 250N000025 HC SEVOFLURANE, PER MIN: Performed by: SURGERY

## 2025-04-28 PROCEDURE — 0DBB0ZZ EXCISION OF ILEUM, OPEN APPROACH: ICD-10-PCS | Performed by: SURGERY

## 2025-04-28 PROCEDURE — 250N000011 HC RX IP 250 OP 636: Performed by: SURGERY

## 2025-04-28 PROCEDURE — 360N000077 HC SURGERY LEVEL 4, PER MIN: Performed by: SURGERY

## 2025-04-28 PROCEDURE — 272N000001 HC OR GENERAL SUPPLY STERILE: Performed by: SURGERY

## 2025-04-28 PROCEDURE — 999N000141 HC STATISTIC PRE-PROCEDURE NURSING ASSESSMENT: Performed by: SURGERY

## 2025-04-28 RX ORDER — OXYCODONE HYDROCHLORIDE 10 MG/1
10 TABLET ORAL EVERY 4 HOURS PRN
Status: DISCONTINUED | OUTPATIENT
Start: 2025-04-28 | End: 2025-05-01 | Stop reason: HOSPADM

## 2025-04-28 RX ORDER — ACETAMINOPHEN 325 MG/1
975 TABLET ORAL ONCE
Status: COMPLETED | OUTPATIENT
Start: 2025-04-28 | End: 2025-04-28

## 2025-04-28 RX ORDER — FENTANYL CITRATE 50 UG/ML
25-50 INJECTION, SOLUTION INTRAMUSCULAR; INTRAVENOUS
Status: DISCONTINUED | OUTPATIENT
Start: 2025-04-28 | End: 2025-04-28 | Stop reason: HOSPADM

## 2025-04-28 RX ORDER — HEPARIN SODIUM 5000 [USP'U]/.5ML
5000 INJECTION, SOLUTION INTRAVENOUS; SUBCUTANEOUS EVERY 8 HOURS
Status: DISCONTINUED | OUTPATIENT
Start: 2025-04-29 | End: 2025-04-29

## 2025-04-28 RX ORDER — OXYCODONE HYDROCHLORIDE 10 MG/1
10 TABLET ORAL
Status: DISCONTINUED | OUTPATIENT
Start: 2025-04-28 | End: 2025-04-28

## 2025-04-28 RX ORDER — NALOXONE HYDROCHLORIDE 0.4 MG/ML
0.4 INJECTION, SOLUTION INTRAMUSCULAR; INTRAVENOUS; SUBCUTANEOUS
Status: DISCONTINUED | OUTPATIENT
Start: 2025-04-28 | End: 2025-04-28 | Stop reason: HOSPADM

## 2025-04-28 RX ORDER — OXYCODONE HYDROCHLORIDE 5 MG/1
5 TABLET ORAL EVERY 4 HOURS PRN
Status: DISCONTINUED | OUTPATIENT
Start: 2025-04-28 | End: 2025-05-01 | Stop reason: HOSPADM

## 2025-04-28 RX ORDER — ONDANSETRON 4 MG/1
4 TABLET, ORALLY DISINTEGRATING ORAL EVERY 6 HOURS PRN
Status: DISCONTINUED | OUTPATIENT
Start: 2025-04-28 | End: 2025-05-01 | Stop reason: HOSPADM

## 2025-04-28 RX ORDER — ONDANSETRON 4 MG/1
4 TABLET, ORALLY DISINTEGRATING ORAL EVERY 30 MIN PRN
Status: DISCONTINUED | OUTPATIENT
Start: 2025-04-28 | End: 2025-04-28

## 2025-04-28 RX ORDER — SODIUM CHLORIDE, SODIUM LACTATE, POTASSIUM CHLORIDE, CALCIUM CHLORIDE 600; 310; 30; 20 MG/100ML; MG/100ML; MG/100ML; MG/100ML
INJECTION, SOLUTION INTRAVENOUS CONTINUOUS PRN
Status: DISCONTINUED | OUTPATIENT
Start: 2025-04-28 | End: 2025-04-28

## 2025-04-28 RX ORDER — DEXAMETHASONE SODIUM PHOSPHATE 4 MG/ML
4 INJECTION, SOLUTION INTRA-ARTICULAR; INTRALESIONAL; INTRAMUSCULAR; INTRAVENOUS; SOFT TISSUE
Status: DISCONTINUED | OUTPATIENT
Start: 2025-04-28 | End: 2025-04-28

## 2025-04-28 RX ORDER — FENTANYL CITRATE 50 UG/ML
25 INJECTION, SOLUTION INTRAMUSCULAR; INTRAVENOUS EVERY 5 MIN PRN
Status: DISCONTINUED | OUTPATIENT
Start: 2025-04-28 | End: 2025-04-28 | Stop reason: HOSPADM

## 2025-04-28 RX ORDER — PROCHLORPERAZINE MALEATE 5 MG/1
10 TABLET ORAL EVERY 6 HOURS PRN
Status: DISCONTINUED | OUTPATIENT
Start: 2025-04-28 | End: 2025-05-01 | Stop reason: HOSPADM

## 2025-04-28 RX ORDER — CEFAZOLIN SODIUM/WATER 2 G/20 ML
2 SYRINGE (ML) INTRAVENOUS
Status: COMPLETED | OUTPATIENT
Start: 2025-04-28 | End: 2025-04-28

## 2025-04-28 RX ORDER — NALOXONE HYDROCHLORIDE 0.4 MG/ML
0.2 INJECTION, SOLUTION INTRAMUSCULAR; INTRAVENOUS; SUBCUTANEOUS
Status: DISCONTINUED | OUTPATIENT
Start: 2025-04-28 | End: 2025-04-28 | Stop reason: HOSPADM

## 2025-04-28 RX ORDER — FLUMAZENIL 0.1 MG/ML
0.2 INJECTION, SOLUTION INTRAVENOUS
Status: DISCONTINUED | OUTPATIENT
Start: 2025-04-28 | End: 2025-04-28 | Stop reason: HOSPADM

## 2025-04-28 RX ORDER — FENTANYL CITRATE 50 UG/ML
INJECTION, SOLUTION INTRAMUSCULAR; INTRAVENOUS PRN
Status: DISCONTINUED | OUTPATIENT
Start: 2025-04-28 | End: 2025-04-28

## 2025-04-28 RX ORDER — ACETAMINOPHEN 500 MG
1000 TABLET ORAL EVERY 6 HOURS
Status: DISCONTINUED | OUTPATIENT
Start: 2025-04-28 | End: 2025-05-01 | Stop reason: HOSPADM

## 2025-04-28 RX ORDER — HYDROMORPHONE HCL IN WATER/PF 6 MG/30 ML
0.4 PATIENT CONTROLLED ANALGESIA SYRINGE INTRAVENOUS
Status: DISCONTINUED | OUTPATIENT
Start: 2025-04-28 | End: 2025-04-30

## 2025-04-28 RX ORDER — PROPOFOL 10 MG/ML
INJECTION, EMULSION INTRAVENOUS PRN
Status: DISCONTINUED | OUTPATIENT
Start: 2025-04-28 | End: 2025-04-28

## 2025-04-28 RX ORDER — NALOXONE HYDROCHLORIDE 0.4 MG/ML
0.2 INJECTION, SOLUTION INTRAMUSCULAR; INTRAVENOUS; SUBCUTANEOUS
Status: DISCONTINUED | OUTPATIENT
Start: 2025-04-28 | End: 2025-05-01 | Stop reason: HOSPADM

## 2025-04-28 RX ORDER — NALOXONE HYDROCHLORIDE 0.4 MG/ML
0.1 INJECTION, SOLUTION INTRAMUSCULAR; INTRAVENOUS; SUBCUTANEOUS
Status: DISCONTINUED | OUTPATIENT
Start: 2025-04-28 | End: 2025-04-28 | Stop reason: HOSPADM

## 2025-04-28 RX ORDER — DEXAMETHASONE SODIUM PHOSPHATE 4 MG/ML
INJECTION, SOLUTION INTRA-ARTICULAR; INTRALESIONAL; INTRAMUSCULAR; INTRAVENOUS; SOFT TISSUE PRN
Status: DISCONTINUED | OUTPATIENT
Start: 2025-04-28 | End: 2025-04-28

## 2025-04-28 RX ORDER — LIDOCAINE HYDROCHLORIDE 20 MG/ML
INJECTION, SOLUTION INFILTRATION; PERINEURAL PRN
Status: DISCONTINUED | OUTPATIENT
Start: 2025-04-28 | End: 2025-04-28

## 2025-04-28 RX ORDER — ONDANSETRON 2 MG/ML
4 INJECTION INTRAMUSCULAR; INTRAVENOUS ONCE
Status: DISCONTINUED | OUTPATIENT
Start: 2025-04-28 | End: 2025-04-28 | Stop reason: HOSPADM

## 2025-04-28 RX ORDER — FENTANYL CITRATE 50 UG/ML
50 INJECTION, SOLUTION INTRAMUSCULAR; INTRAVENOUS EVERY 5 MIN PRN
Status: DISCONTINUED | OUTPATIENT
Start: 2025-04-28 | End: 2025-04-28 | Stop reason: HOSPADM

## 2025-04-28 RX ORDER — ONDANSETRON 2 MG/ML
INJECTION INTRAMUSCULAR; INTRAVENOUS PRN
Status: DISCONTINUED | OUTPATIENT
Start: 2025-04-28 | End: 2025-04-28

## 2025-04-28 RX ORDER — DEXAMETHASONE SODIUM PHOSPHATE 4 MG/ML
4 INJECTION, SOLUTION INTRA-ARTICULAR; INTRALESIONAL; INTRAMUSCULAR; INTRAVENOUS; SOFT TISSUE
Status: DISCONTINUED | OUTPATIENT
Start: 2025-04-28 | End: 2025-04-28 | Stop reason: HOSPADM

## 2025-04-28 RX ORDER — HYDROMORPHONE HCL IN WATER/PF 6 MG/30 ML
0.4 PATIENT CONTROLLED ANALGESIA SYRINGE INTRAVENOUS EVERY 5 MIN PRN
Status: DISCONTINUED | OUTPATIENT
Start: 2025-04-28 | End: 2025-04-28 | Stop reason: HOSPADM

## 2025-04-28 RX ORDER — NALOXONE HYDROCHLORIDE 0.4 MG/ML
0.4 INJECTION, SOLUTION INTRAMUSCULAR; INTRAVENOUS; SUBCUTANEOUS
Status: DISCONTINUED | OUTPATIENT
Start: 2025-04-28 | End: 2025-05-01 | Stop reason: HOSPADM

## 2025-04-28 RX ORDER — ALVIMOPAN 12 MG/1
12 CAPSULE ORAL ONCE
Status: COMPLETED | OUTPATIENT
Start: 2025-04-28 | End: 2025-04-28

## 2025-04-28 RX ORDER — CEFAZOLIN SODIUM/WATER 2 G/20 ML
2 SYRINGE (ML) INTRAVENOUS SEE ADMIN INSTRUCTIONS
Status: DISCONTINUED | OUTPATIENT
Start: 2025-04-28 | End: 2025-04-28 | Stop reason: HOSPADM

## 2025-04-28 RX ORDER — ALVIMOPAN 12 MG/1
12 CAPSULE ORAL 2 TIMES DAILY
Status: DISCONTINUED | OUTPATIENT
Start: 2025-04-29 | End: 2025-04-30

## 2025-04-28 RX ORDER — SODIUM CHLORIDE, SODIUM LACTATE, POTASSIUM CHLORIDE, CALCIUM CHLORIDE 600; 310; 30; 20 MG/100ML; MG/100ML; MG/100ML; MG/100ML
INJECTION, SOLUTION INTRAVENOUS CONTINUOUS
Status: DISCONTINUED | OUTPATIENT
Start: 2025-04-28 | End: 2025-04-28 | Stop reason: HOSPADM

## 2025-04-28 RX ORDER — HYDROMORPHONE HCL IN WATER/PF 6 MG/30 ML
0.2 PATIENT CONTROLLED ANALGESIA SYRINGE INTRAVENOUS
Status: DISCONTINUED | OUTPATIENT
Start: 2025-04-28 | End: 2025-04-30

## 2025-04-28 RX ORDER — ONDANSETRON 2 MG/ML
4 INJECTION INTRAMUSCULAR; INTRAVENOUS EVERY 6 HOURS PRN
Status: DISCONTINUED | OUTPATIENT
Start: 2025-04-28 | End: 2025-05-01 | Stop reason: HOSPADM

## 2025-04-28 RX ORDER — OXYCODONE HYDROCHLORIDE 5 MG/1
5 TABLET ORAL
Status: DISCONTINUED | OUTPATIENT
Start: 2025-04-28 | End: 2025-04-28

## 2025-04-28 RX ORDER — ONDANSETRON 4 MG/1
4 TABLET, ORALLY DISINTEGRATING ORAL EVERY 30 MIN PRN
Status: DISCONTINUED | OUTPATIENT
Start: 2025-04-28 | End: 2025-04-28 | Stop reason: HOSPADM

## 2025-04-28 RX ORDER — HYDROMORPHONE HCL IN WATER/PF 6 MG/30 ML
0.2 PATIENT CONTROLLED ANALGESIA SYRINGE INTRAVENOUS EVERY 5 MIN PRN
Status: DISCONTINUED | OUTPATIENT
Start: 2025-04-28 | End: 2025-04-28 | Stop reason: HOSPADM

## 2025-04-28 RX ORDER — ESMOLOL HYDROCHLORIDE 10 MG/ML
INJECTION INTRAVENOUS PRN
Status: DISCONTINUED | OUTPATIENT
Start: 2025-04-28 | End: 2025-04-28

## 2025-04-28 RX ORDER — ONDANSETRON 2 MG/ML
4 INJECTION INTRAMUSCULAR; INTRAVENOUS EVERY 30 MIN PRN
Status: DISCONTINUED | OUTPATIENT
Start: 2025-04-28 | End: 2025-04-28

## 2025-04-28 RX ORDER — NALOXONE HYDROCHLORIDE 0.4 MG/ML
0.1 INJECTION, SOLUTION INTRAMUSCULAR; INTRAVENOUS; SUBCUTANEOUS
Status: DISCONTINUED | OUTPATIENT
Start: 2025-04-28 | End: 2025-04-28

## 2025-04-28 RX ORDER — HEPARIN SODIUM 5000 [USP'U]/.5ML
5000 INJECTION, SOLUTION INTRAVENOUS; SUBCUTANEOUS
Status: COMPLETED | OUTPATIENT
Start: 2025-04-28 | End: 2025-04-28

## 2025-04-28 RX ORDER — ONDANSETRON 2 MG/ML
4 INJECTION INTRAMUSCULAR; INTRAVENOUS EVERY 30 MIN PRN
Status: DISCONTINUED | OUTPATIENT
Start: 2025-04-28 | End: 2025-04-28 | Stop reason: HOSPADM

## 2025-04-28 RX ORDER — LIDOCAINE 40 MG/G
CREAM TOPICAL
Status: DISCONTINUED | OUTPATIENT
Start: 2025-04-28 | End: 2025-05-01 | Stop reason: HOSPADM

## 2025-04-28 RX ORDER — METRONIDAZOLE 500 MG/100ML
500 INJECTION, SOLUTION INTRAVENOUS
Status: COMPLETED | OUTPATIENT
Start: 2025-04-28 | End: 2025-04-28

## 2025-04-28 RX ORDER — SODIUM CHLORIDE, SODIUM LACTATE, POTASSIUM CHLORIDE, CALCIUM CHLORIDE 600; 310; 30; 20 MG/100ML; MG/100ML; MG/100ML; MG/100ML
INJECTION, SOLUTION INTRAVENOUS CONTINUOUS
Status: DISCONTINUED | OUTPATIENT
Start: 2025-04-28 | End: 2025-04-29

## 2025-04-28 RX ADMIN — OXYCODONE HYDROCHLORIDE 5 MG: 5 TABLET ORAL at 17:22

## 2025-04-28 RX ADMIN — ALVIMOPAN 12 MG: 12 CAPSULE ORAL at 09:01

## 2025-04-28 RX ADMIN — SODIUM CHLORIDE, SODIUM LACTATE, POTASSIUM CHLORIDE, AND CALCIUM CHLORIDE: .6; .31; .03; .02 INJECTION, SOLUTION INTRAVENOUS at 17:23

## 2025-04-28 RX ADMIN — FENTANYL CITRATE 100 MCG: 50 INJECTION INTRAMUSCULAR; INTRAVENOUS at 11:43

## 2025-04-28 RX ADMIN — HYDROMORPHONE HYDROCHLORIDE 0.2 MG: 0.2 INJECTION, SOLUTION INTRAMUSCULAR; INTRAVENOUS; SUBCUTANEOUS at 19:46

## 2025-04-28 RX ADMIN — ACETAMINOPHEN 1000 MG: 500 TABLET ORAL at 21:48

## 2025-04-28 RX ADMIN — HYDROMORPHONE HYDROCHLORIDE 0.2 MG: 0.2 INJECTION, SOLUTION INTRAMUSCULAR; INTRAVENOUS; SUBCUTANEOUS at 15:48

## 2025-04-28 RX ADMIN — ACETAMINOPHEN 975 MG: 325 TABLET ORAL at 09:01

## 2025-04-28 RX ADMIN — Medication 70 MG: at 11:13

## 2025-04-28 RX ADMIN — ONDANSETRON 4 MG: 2 INJECTION INTRAMUSCULAR; INTRAVENOUS at 12:27

## 2025-04-28 RX ADMIN — SODIUM CHLORIDE, SODIUM LACTATE, POTASSIUM CHLORIDE, AND CALCIUM CHLORIDE: .6; .31; .03; .02 INJECTION, SOLUTION INTRAVENOUS at 11:07

## 2025-04-28 RX ADMIN — PROPOFOL 180 MG: 10 INJECTION, EMULSION INTRAVENOUS at 11:11

## 2025-04-28 RX ADMIN — MIDAZOLAM 2 MG: 1 INJECTION INTRAMUSCULAR; INTRAVENOUS at 10:59

## 2025-04-28 RX ADMIN — ESMOLOL HYDROCHLORIDE 100 MG: 10 INJECTION, SOLUTION INTRAVENOUS at 11:11

## 2025-04-28 RX ADMIN — Medication 2 G: at 11:05

## 2025-04-28 RX ADMIN — HYDROMORPHONE HYDROCHLORIDE 0.5 MG: 1 INJECTION, SOLUTION INTRAMUSCULAR; INTRAVENOUS; SUBCUTANEOUS at 12:17

## 2025-04-28 RX ADMIN — ACETAMINOPHEN 1000 MG: 500 TABLET ORAL at 15:48

## 2025-04-28 RX ADMIN — OXYCODONE HYDROCHLORIDE 10 MG: 10 TABLET ORAL at 23:50

## 2025-04-28 RX ADMIN — LIDOCAINE HYDROCHLORIDE 50 MG: 20 INJECTION, SOLUTION INFILTRATION; PERINEURAL at 11:11

## 2025-04-28 RX ADMIN — Medication 20 MG: at 12:05

## 2025-04-28 RX ADMIN — DEXAMETHASONE SODIUM PHOSPHATE 4 MG: 4 INJECTION, SOLUTION INTRA-ARTICULAR; INTRALESIONAL; INTRAMUSCULAR; INTRAVENOUS; SOFT TISSUE at 11:29

## 2025-04-28 RX ADMIN — OXYCODONE HYDROCHLORIDE 10 MG: 10 TABLET ORAL at 21:48

## 2025-04-28 RX ADMIN — Medication 200 MG: at 12:57

## 2025-04-28 RX ADMIN — HEPARIN SODIUM 5000 UNITS: 5000 INJECTION, SOLUTION INTRAVENOUS; SUBCUTANEOUS at 09:04

## 2025-04-28 RX ADMIN — FENTANYL CITRATE 50 MCG: 50 INJECTION, SOLUTION INTRAMUSCULAR; INTRAVENOUS at 13:35

## 2025-04-28 RX ADMIN — METRONIDAZOLE 500 MG: 500 INJECTION, SOLUTION INTRAVENOUS at 09:24

## 2025-04-28 ASSESSMENT — ACTIVITIES OF DAILY LIVING (ADL)
ADLS_ACUITY_SCORE: 49
ADLS_ACUITY_SCORE: 49
ADLS_ACUITY_SCORE: 53
ADLS_ACUITY_SCORE: 36
ADLS_ACUITY_SCORE: 49
ADLS_ACUITY_SCORE: 36
ADLS_ACUITY_SCORE: 49
ADLS_ACUITY_SCORE: 36
ADLS_ACUITY_SCORE: 49
ADLS_ACUITY_SCORE: 49
ADLS_ACUITY_SCORE: 53
ADLS_ACUITY_SCORE: 49

## 2025-04-28 ASSESSMENT — LIFESTYLE VARIABLES: TOBACCO_USE: 1

## 2025-04-28 NOTE — ANESTHESIA PROCEDURE NOTES
Airway       Patient location during procedure: OR       Procedure Start/Stop Times: 4/28/2025 11:15 AM  Staff -        CRNA: Dirk Patel APRN CRNA       Performed By: CRNA  Consent for Airway        Urgency: elective  Indications and Patient Condition       Indications for airway management: herb-procedural       Induction type:intravenous       Mask difficulty assessment: 2 - vent by mask + OA or adjuvant +/- NMBA    Final Airway Details       Final airway type: endotracheal airway       Successful airway: ETT - single  Endotracheal Airway Details        ETT size (mm): 7.5       Cuffed: yes       Successful intubation technique: direct laryngoscopy       DL Blade Type: MAC 3       Grade View of Cords: 1       Adjucts: stylet       Measured from: gums/teeth       Secured at (cm): 23       Bite block used: None    Post intubation assessment        Placement verified by: capnometry, equal breath sounds and chest rise        Number of attempts at approach: 1       Number of other approaches attempted: 0       Secured with: tape       Ease of procedure: easy       Dentition: Intact and Unchanged    Medication(s) Administered   Medication Administration Time: 4/28/2025 11:15 AM    Additional Comments       Atraumatic intubation. +ETCO2 and bilateral breath sounds.

## 2025-04-28 NOTE — BRIEF OP NOTE
[facility]    Brief Operative Note    Pre-operative diagnosis: Ileostomy status (H) [Z93.2]  Post-operative diagnosis Same as pre-operative diagnosis    Procedure: CLOSURE, ILEOSTOMY. repaired parastomal hernia., N/A - Abdomen    Surgeon: Surgeons and Role:     * Frandy Kauffman MD - Primary     * González Gonzáles MD - Resident - Assisting     * Westley Riley MD - Fellow - Assisting  Anesthesia: General with Block   Estimated Blood Loss: Less than 10 ml    Drains: None  Specimens:   ID Type Source Tests Collected by Time Destination   1 : ileostomy Tissue Other SURGICAL PATHOLOGY EXAM Frandy Kauffman MD 4/28/2025 12:16 PM      Findings:   None.  Complications: None.  Implants: * No implants in log *

## 2025-04-28 NOTE — PLAN OF CARE
Goal Outcome Evaluation:      Plan of Care Reviewed With: patient    Overall Patient Progress: no change    Significant 24 hour events:    Level of Care: Acute    Summary Type: 5A Post Op Report   POD: #0 = 4/28   Complications: none   Pain:  Controlled with PRN oxycodone x 2, IV dilaudid x 2   Neuro:WDL  CV:WDL  Resp:WDL, breath sounds: diminished. IS encouraged. Capno refused.  GI:.WDL except, bowel sounds, passing flatus: hypoactive bowels, no gas, no BM.   :WDL  Skin: .WDL except, integrity: old incision site, ileostomy takedown site  Activity Assistance: assistance, 2 people. Walked to bathroom x 1. Stood without difficulty.   Safety/Falls Risk: Level of Risk: Moderate Risk  Consults: PT/OT  Procedures/Imaging: none  Precautions:     AVSS on room air. Ileostomy takedown site intact, dressing with moderate dried drainage, dressings to be changed by provider, no reinforcement needed at this time.

## 2025-04-28 NOTE — ANESTHESIA PREPROCEDURE EVALUATION
Pre-Operative H & P     CC:  Preoperative exam to assess for increased cardiopulmonary risk while undergoing surgery and anesthesia.    Date of Encounter: 4/22/2025  Primary Care Physician:  Nyla Pastor     Reason for visit:   No diagnosis found.      LINDA Quintero is a 49 year old male who presents for pre-operative H & P in preparation for  Procedure Information       Case: [id number] Date/Time: 04/28/25 1000    Procedure: CLOSURE, ILEOSTOMY (Abdomen)    Anesthesia type: General    Diagnosis: Ileostomy status (H) [Z93.2]    Pre-op diagnosis: Ileostomy status (H) [Z93.2]    Location:  OR  /  OR    Providers: Frandy Kauffman MD            Patient is being evaluated for comorbid conditions of obesity, diverticulitis, alcohol use disorder, and tobacco use.    He has a history of moderately differentiated adenocarcinoma.  He is status post open LAR with diverting loop ileostomy in November 2024.  His postoperative course was notable for SBO abdominal wall cellulitis requiring admission in December 2024.  He was treated with antibiotics.  He recently completed follow-up with oncology and colorectal surgery.  Surveillance has been recommended for his colon cancer and a Gastrografin enema completed in March 2025 demonstrated no evidence of anastomotic leak or stricture.  He is now scheduled for ileostomy takedown as above.    History is obtained from the patient and chart review    Hx of abnormal bleeding or anti-platelet use: Denies      Past Medical History  Past Medical History:   Diagnosis Date    Bilateral low back pain with right-sided sciatica     Diverticulitis of colon 05/01/2024    History of alcohol use disorder     HLD (hyperlipidemia)     Obesity     Tobacco use disorder        Past Surgical History  Past Surgical History:   Procedure Laterality Date    COLONOSCOPY N/A 11/11/2024    Procedure: COLONOSCOPY, WITH POLYPECTOMY AND BIOPSY;  Surgeon: Ranjan Verdin MD;  Location: [facility]     DAVINCI ASSISTED RESECTION LOW ANTERIOR N/A 11/21/2024    Procedure: ROBOT-ASSISTED, CONVERTED TO OPEN, PROCTECTOMY, DIVERTING LOOP ILEOSTOMY; repair of umbilical hernia;  Surgeon: Frandy Kauffman MD;  Location: [facility] OR    INSERT STENT URETER Bilateral 11/21/2024    Procedure: Cystoscopy, Temporary Insert Stent Bilateral Ureter;  Surgeon: Xavier Art MD;  Location: [facility] OR    RESECTION ABDOMINAL PERINEAL N/A 11/21/2024    Procedure: OPEN PROCTECTOMY, DIVERTING LOOP ILEOSTOMY; repair of umbilical hernia;  Surgeon: Frandy Kauffman MD;  Location: [facility] OR    SIGMOIDOSCOPY FLEXIBLE N/A 11/21/2024    Procedure: Sigmoidoscopy flexible;  Surgeon: Frandy Kauffman MD;  Location: [facility] OR       Prior to Admission Medications  No current outpatient medications on file.       Allergies  Allergies   Allergen Reactions    Codeine Swelling and Itching     PN: LW Reaction: EDEMA, GENERALIZED   Tolerated hydromorphone 5/1/24    Penicillins Other (See Comments)     Swelling, burning feeling in hands and feet. , PN: LW Reaction: EDEMA, GENERALIZED       Social History  Social History     Socioeconomic History    Marital status:      Spouse name: Not on file    Number of children: Not on file    Years of education: Not on file    Highest education level: Not on file   Occupational History    Not on file   Tobacco Use    Smoking status: Every Day     Current packs/day: 1.00     Average packs/day: 1 pack/day for 30.8 years (30.8 ttl pk-yrs)     Types: Cigarettes     Start date: 7/9/1994    Smokeless tobacco: Never    Tobacco comments:     4/22/25 working on quitting, down to 3 cigarettes daily   Substance and Sexual Activity    Alcohol use: Not Currently     Alcohol/week: 15.0 standard drinks of alcohol     Types: 15 Glasses of wine per week     Comment: Last alcohol months ago as of 4/22/25    Drug use: Yes     Types: Marijuana     Comment: Daily    Sexual activity: Not on file   Other Topics Concern    Not on file   Social History  Narrative    Not on file     Social Drivers of Health     Financial Resource Strain: Low Risk  (12/7/2024)    Financial Resource Strain     Within the past 12 months, have you or your family members you live with been unable to get utilities (heat, electricity) when it was really needed?: No   Food Insecurity: Low Risk  (12/7/2024)    Food Insecurity     Within the past 12 months, did you worry that your food would run out before you got money to buy more?: No     Within the past 12 months, did the food you bought just not last and you didn t have money to get more?: No   Transportation Needs: Low Risk  (12/7/2024)    Transportation Needs     Within the past 12 months, has lack of transportation kept you from medical appointments, getting your medicines, non-medical meetings or appointments, work, or from getting things that you need?: No   Physical Activity: Not on file   Stress: Not on file   Social Connections: Not on file   Interpersonal Safety: Low Risk  (4/28/2025)    Interpersonal Safety     Do you feel physically and emotionally safe where you currently live?: Yes     Within the past 12 months, have you been hit, slapped, kicked or otherwise physically hurt by someone?: No     Within the past 12 months, have you been humiliated or emotionally abused in other ways by your partner or ex-partner?: No   Housing Stability: Low Risk  (12/7/2024)    Housing Stability     Do you have housing? : Yes     Are you worried about losing your housing?: No       Family History  Family History   Problem Relation Age of Onset    Colon Polyps Mother     Alcoholism Father     Diabetes Father     Colon Cancer No family hx of     Anesthesia Reaction No family hx of     Deep Vein Thrombosis (DVT) No family hx of     Clotting Disorder No family hx of        Review of Systems  The complete review of systems is negative other than noted in the HPI or here.   Anesthesia Evaluation   Pt has had prior anesthetic. Type: General.    No  "history of anesthetic complications       ROS/MED HX  ENT/Pulmonary:     (+)     BETI risk factors, snores loudly,          tobacco use, Current use,                    (-) asthma and recent URI   Neurologic:  - neg neurologic ROS     Cardiovascular: Comment: Denies chest pain/pressure, DOWNING, orthopnea, dizziness, palpitations, edema.     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    Infectious Disease: Comment: History of abdominal wall cellulitis 12/2024   (-) Recent Fever   Malignancy:   (+) Malignancy, History of GI.GI CA  Remission status post Surgery.      Other:  - neg other ROS          /75   Pulse 67   Temp 36.8  C (98.3  F) (Oral)   Resp 16   Ht 1.702 m (5' 7\")   Wt 99.1 kg (218 lb 7.6 oz)   BMI 34.22 kg/m      Physical Exam   Constitutional: Pleasant, well-appearing, no apparent distress, and appears stated age.  Eyes: Pupils equal, round and reactive to light, extra ocular muscles intact, sclera clear, conjunctiva normal.  HENT: Normocephalic and atraumatic, oral pharynx with moist mucus membranes, poor dentition. No goiter appreciated.   Respiratory: Clear to auscultation bilaterally, no crackles or wheezing.  Cardiovascular: Regular rate and rhythm, normal S1 and S2, and no " murmur noted.  Carotids +2, no bruits. No edema. Palpable pulses to radial arteries.   GI: Nondistended abdomen, ileostomy present  Lymph/Hematologic: No cervical lymphadenopathy and no supraclavicular lymphadenopathy.  Genitourinary:  Deferred  Skin: Warm and dry.  No rashes on exposed skin   Musculoskeletal: Full ROM of neck. There is no redness, warmth, or swelling of the visible joints. Gross motor strength is normal.    Neurologic: Awake, alert, oriented to name, place and time. Cranial nerves II-XII are grossly intact. Gait is normal.   Neuropsychiatric: Calm, cooperative. Normal affect.       Prior Labs/Diagnostic Studies   All labs and imaging personally reviewed     EKG/ stress test - if available please see in ROS above   No results found.       No data to display                  The patient's records and results personally reviewed by this provider.     Outside records reviewed from: Care Everywhere    LAB/DIAGNOSTIC STUDIES TODAY:  Per Dr. Kauffman    Assessment  Bella Quintero is a 49 year old male seen as a PAC referral for risk assessment and optimization for anesthesia.    Plan/Recommendations  Pt will be optimized for the proposed procedure.  See below for details on the assessment, risk, and preoperative recommendations    NEUROLOGY  - No history of TIA, CVA or seizure    -Post Op delirium risk factors:  No risk identified    ENT  - No current airway concerns.  Will need to be reassessed day of surgery.  Mallampati: I  TM: > 3    CARDIAC  - No history of CAD, Hypertension, and Afib  - Hyperlipidemia  Not currently on medications  -Patient denies cardiac history or cardiac symptoms today  - METS (Metabolic Equivalents)  Patient performs 4 or more METS exercise without symptoms             Total Score: 0      RCRI-Very low risk: Class 1 0.4% complication rate             Total Score: 0        PULMONARY  - Obstructive Sleep Apnea  BETI risk with no formal diagnosis  Patient reports improved sleep  "quality since he quit drinking and has lost some weight.    BETI Medium Risk             Total Score: 4    BETI: Snores loudly    BETI: BMI over 35 kg/m2    BETI: Over 50 ys old    BETI: Male      - Denies asthma or inhaler use  - Tobacco History    History   Smoking Status    Every Day    Types: Cigarettes   Smokeless Tobacco    Never   He continues to work on smoking cessation and is now down to 3 cigarettes daily.  Patient was encouraged to continue to reduce tobacco use leading up to surgery.    GI  -History of complicated diverticulitis  - History of small bowel obstruction  -Colon cancer  Status post LAR with diverting loop ileostomy, now scheduled for ileostomy takedown as above.    PONV Low Risk  Total Score: 1           1 AN PONV: Intended Post Op Opioids        /RENAL  - Baseline Creatinine  0.81    ENDOCRINE    - BMI: Estimated body mass index is 34.22 kg/m  as calculated from the following:    Height as of this encounter: 1.702 m (5' 7\").    Weight as of this encounter: 99.1 kg (218 lb 7.6 oz).  Obesity (BMI >30)  - No history of Diabetes Mellitus    HEME  VTE Low Risk 0.5%             Total Score: 2    VTE: Male      - No history of abnormal bleeding or antiplatelet use.      MSK  Patient is NOT Frail             Total Score: 0          PSYCH/SUBSTANCE  - Alcohol use disorder  Patient reports that he has abstained from alcohol use for the last several months.  - Marijuana use  Patient reports daily use of cannabis products.  He was encouraged to abstain for 24 hours prior to surgery.    Different anesthesia methods/types have been discussed with the patient, but they are aware that the final plan will be decided by the assigned anesthesia provider on the date of service.    The patient is optimized for their procedure. AVS with information on surgery time/arrival time, meds and NPO status given by nursing staff. No further diagnostic testing indicated.      On the day of service:     Prep time: 10 " minutes  Visit time: 10 minutes  Documentation time: 10 minutes  ------------------------------------------  Total time: 30 minutes      Angela Poon PA-C  Preoperative Assessment Center  [facility]  Phone: [phone]  Fax: [phone]    Physical Exam    Airway        Mallampati: II   TM distance: > 3 FB   Neck ROM: full     Respiratory Devices and Support         Dental       (+) Completely normal teeth      Cardiovascular          Rhythm and rate: regular and normal     Pulmonary           breath sounds clear to auscultation           Anesthesia Plan    ASA Status:  3    NPO Status:  NPO Appropriate    Anesthesia Type: General.     - Airway: ETT   Induction: Intravenous.   Maintenance: Balanced.   Techniques and Equipment:     - Lines/Monitors: 2nd IV     Consents    Anesthesia Plan(s) and associated risks, benefits, and realistic alternatives discussed. Questions answered and patient/representative(s) expressed understanding.     - Discussed: Risks, Benefits and Alternatives for the PROCEDURE were discussed     - Discussed with:  Patient      - Extended Intubation/Ventilatory Support Discussed: No.      Use of blood products discussed: No .     Postoperative Care    Pain management: IV analgesics.   PONV prophylaxis: Ondansetron (or other 5HT-3), Dexamethasone or Solumedrol     Comments:

## 2025-04-28 NOTE — PHARMACY-ADMISSION MEDICATION HISTORY
Pharmacist Admission Medication History    Admission medication history is complete. The information provided in this note is only as accurate as the sources available at the time of the update.    Information Source(s):  Pre-op RN assessment, Dispense history (Surescripts)      Medication History Completed By: Suni Mayen RPH 4/28/2025 3:41 PM    No outpatient medications have been marked as taking for the 4/28/25 encounter (Hospital Encounter).

## 2025-04-28 NOTE — OP NOTE
"PREOPERATIVE DIAGNOSIS:  1. Loop ileostomy status.  2. Rectal cancer s/p LAR  3. Obesity    POSTOPERATIVE DIAGNOSIS:   Same    PROCEDURE:  1. Loop ileostomy takedown.  2. Small bowel, stapled, side-to-side anastomosis.  3. Repair of abdominal wall at ileostomy site.    ANESTHESIA: General endotracheal anesthesia plus bilateral TAP blocks local anesthesia.    SURGEON:  Frandy Kauffman M.D.    ASSISTANT(S): Gordy Riley M.D., CRS Fellow    INDICATIONS FOR PROCEDURE  This is a 49 year old M with a large rectosigmoid adenocarcinoma now s/p open LAR with DLI on 11/21/2024. Surgical Pathology demonstrated moderately differentiated adenocarcinoma, + PNI, +TN, pT3N0 with no recs for adjuvant treatment. GGE and flex sig show an intact anastomosis. Reversal is indicated. All questions were answered. Risks and benefits were discussed, he agreed on proceeding with surgical intervention.     General risks related to abdominal surgery were reviewed with the patient. These include, but are not limited to, death, myocardial infarction, pneumonia, urinary tract infection, deep venous thrombosis with or without pulmonary embolus, abdominal infection from bowel injury or abscess, fistula, anastomotic leak that may require reoperation and stoma, bowel obstruction, wound infection, and bleeding.    OPERATIVE PROCEDURE: After obtaining informed consent, the patient was brought to the operating room and placed in the supine position. Appropriate preoperative mechanical and chemical deep venous thrombosis prophylaxis, as well as preoperative prophylactic parenteral antibiotics were given. General endotracheal anesthesia was gently induced. Bilateral lower extremity pneumatic compression devices were applied and all pressure points were cushioned. A Warren cathter was inserted. The abdomen was then preped and draped in the standard sterile fashion. After a \"time-out\" was performed, a circumferential incision was made at the mucocutaneous " junction of the ileostomy. The subcutaneous tissue was carefully dissected off the terminal ileum down to the level of the fascia. The terminal ileum was then  from the abdominal wall fascia and muscle using sharp dissection. The peritoneal cavity was then entered and any additional adhesions from the ileum to the parietal peritoneum were carefully taken down. After obtaining sufficient bowel length to perform an anastomosis, two mesenteric windows proximal and distal to the anastomosis were created and the mesentery was divided with sequential fires of the ligasure device. Enterotomy were made. Both segments of terminal ileum were aligned, making sure to not incorporate mesentery or adjacent tissues, and a stapled side-to-side functional end-to-end ileo-ileal anastomosis was made with a JUAN surgical stapler (blue load). The anastomosis was evaluated through the common enterotomy and no bleeding was visualized. After this, a second fire of the JUAN stapler (blue load) was used to complete the anastomosis, making sure the JUAN staple lines were offset. The specimen, including the loop ileostomy edges was sent to pathology. The anastomosis appeared to be well vascularized, widely patent, and without tension or torsion. The last  staple line was reinforced with a 3-0 PDS running suture. A crutch stitch was made with another 3-0 PDS.    The bowel was then returned to the peritoneal cavity.  Hemostasis was assured. There was no evidence of bleeding or bowel injury. Instrument, sponge, and needle counts were all correct, as reported to me. The right lower quadrant abdominal wall defect was re approximated with multiple 0-PDS figure-of-eight sutures. The wound was irrigated and dried, and hemostasis was corroborated. The dermis was loosely re-approximated in a circular fashion with a running 3-0 Monocryl pursestring suture. A sterile dressing was applied. The patient tolerated the procedure well.    The wounds were  injected with local anesthetics.    COMPLICATIONS: none, immediately.    ESTIMATED BLOOD LOSS: 10mL.    REPLACEMENT:     - Crystalloid: 700mL.      SPECIMEN(S): Ileostomy.    OPERATIVE COUNT: Complete.    DRAINS/TUBES: Warren catheter was removed at the end of the case.    OPERATIVE FINDINGS: stapled side-to-side ileo-ileal anastomosis.    Frandy Kauffman MD    Division of Colon & Rectal Surgery  Department of Surgery  [facility]  [phone]

## 2025-04-28 NOTE — PROGRESS NOTES
Nursing Focus: Admission    D: Patient admitted/transferred from PACU via stretcher for post-op recovery.      I: Upon arrival to the unit patient was oriented to room, unit, and call light. Patient s height, weight, and vital signs were obtained. Allergies reviewed and allergy band applied. MD notified of patient s arrival on the unit. Adult AVS completed by bedside RN. Head to toe assessment completed. Education assessment completed. Care plan initiated.     A: Vital signs stable upon admission. Patient rates pain at 4/10. Two RN skin assessment completed. Second RN was Farideh NOEL Significant Skin Findings include small rash on lower face.     P: Continue to monitor patient s pain and intervene as needed. Continue with plan of care. Notify MD with any concerns or changes in patient status.

## 2025-04-28 NOTE — ANESTHESIA CARE TRANSFER NOTE
Patient: Bella Quintero    Procedure: Procedure(s):  CLOSURE, ILEOSTOMY. repaired parastomal hernia.       Diagnosis: Ileostomy status (H) [Z93.2]  Diagnosis Additional Information: No value filed.    Anesthesia Type:   General     Note:    Oropharynx: oropharynx clear of all foreign objects and spontaneously breathing  Level of Consciousness: drowsy  Oxygen Supplementation: face mask  Level of Supplemental Oxygen (L/min / FiO2): 8  Independent Airway: airway patency satisfactory and stable  Dentition: dentition unchanged  Vital Signs Stable: post-procedure vital signs reviewed and stable  Report to RN Given: handoff report given  Patient transferred to: PACU    Handoff Report: Identifed the Patient, Identified the Reponsible Provider, Reviewed the pertinent medical history, Discussed the surgical course, Reviewed Intra-OP anesthesia mangement and issues during anesthesia, Set expectations for post-procedure period and Allowed opportunity for questions and acknowledgement of understanding      Vitals:  Vitals Value Taken Time   /87    Temp     Pulse 86 04/28/25 1313   Resp 14 04/28/25 1313   SpO2 98 % 04/28/25 1313   Vitals shown include unfiled device data.    Electronically Signed By: RACHEL Moise CRNA  April 28, 2025  1:14 PM

## 2025-04-28 NOTE — ANESTHESIA POSTPROCEDURE EVALUATION
Patient: Bella Quintero    Procedure: Procedure(s):  CLOSURE, ILEOSTOMY. repaired parastomal hernia.       Anesthesia Type:  General    Note:  Disposition: Admission   Postop Pain Control: Uneventful            Sign Out: Well controlled pain   PONV: No   Neuro/Psych: Uneventful            Sign Out: Acceptable/Baseline neuro status   Airway/Respiratory: Uneventful            Sign Out: Acceptable/Baseline resp. status   CV/Hemodynamics: Uneventful            Sign Out: Acceptable CV status; No obvious hypovolemia; No obvious fluid overload   Other NRE: NONE   DID A NON-ROUTINE EVENT OCCUR? No           Last vitals:  Vitals Value Taken Time   /66 04/28/25 1400   Temp 36.6  C (97.9  F) 04/28/25 1358   Pulse 64 04/28/25 1411   Resp 10 04/28/25 1411   SpO2 97 % 04/28/25 1411   Vitals shown include unfiled device data.    Electronically Signed By: Valente Vega MD  April 28, 2025  2:11 PM

## 2025-04-29 ENCOUNTER — APPOINTMENT (OUTPATIENT)
Dept: PHYSICAL THERAPY | Facility: CLINIC | Age: 50
End: 2025-04-29
Attending: SURGERY
Payer: COMMERCIAL

## 2025-04-29 LAB
ANION GAP SERPL CALCULATED.3IONS-SCNC: 11 MMOL/L (ref 7–15)
BUN SERPL-MCNC: 14.4 MG/DL (ref 6–20)
CALCIUM SERPL-MCNC: 8.8 MG/DL (ref 8.8–10.4)
CHLORIDE SERPL-SCNC: 100 MMOL/L (ref 98–107)
CREAT SERPL-MCNC: 0.88 MG/DL (ref 0.67–1.17)
EGFRCR SERPLBLD CKD-EPI 2021: >90 ML/MIN/1.73M2
ERYTHROCYTE [DISTWIDTH] IN BLOOD BY AUTOMATED COUNT: 14.3 % (ref 10–15)
GLUCOSE BLDC GLUCOMTR-MCNC: 132 MG/DL (ref 70–99)
GLUCOSE SERPL-MCNC: 114 MG/DL (ref 70–99)
HCO3 SERPL-SCNC: 22 MMOL/L (ref 22–29)
HCT VFR BLD AUTO: 41.4 % (ref 40–53)
HGB BLD-MCNC: 14.2 G/DL (ref 13.3–17.7)
MAGNESIUM SERPL-MCNC: 2 MG/DL (ref 1.7–2.3)
MCH RBC QN AUTO: 30.6 PG (ref 26.5–33)
MCHC RBC AUTO-ENTMCNC: 34.3 G/DL (ref 31.5–36.5)
MCV RBC AUTO: 89 FL (ref 78–100)
PHOSPHATE SERPL-MCNC: 4 MG/DL (ref 2.5–4.5)
PLATELET # BLD AUTO: 272 10E3/UL (ref 150–450)
POTASSIUM SERPL-SCNC: 4.6 MMOL/L (ref 3.4–5.3)
RBC # BLD AUTO: 4.64 10E6/UL (ref 4.4–5.9)
SODIUM SERPL-SCNC: 133 MMOL/L (ref 135–145)
WBC # BLD AUTO: 17.3 10E3/UL (ref 4–11)

## 2025-04-29 PROCEDURE — 250N000011 HC RX IP 250 OP 636: Performed by: SURGERY

## 2025-04-29 PROCEDURE — 36415 COLL VENOUS BLD VENIPUNCTURE: CPT | Performed by: SURGERY

## 2025-04-29 PROCEDURE — 84100 ASSAY OF PHOSPHORUS: CPT | Performed by: SURGERY

## 2025-04-29 PROCEDURE — 83735 ASSAY OF MAGNESIUM: CPT | Performed by: SURGERY

## 2025-04-29 PROCEDURE — 250N000011 HC RX IP 250 OP 636: Performed by: PHYSICIAN ASSISTANT

## 2025-04-29 PROCEDURE — 250N000013 HC RX MED GY IP 250 OP 250 PS 637: Performed by: SURGERY

## 2025-04-29 PROCEDURE — 120N000002 HC R&B MED SURG/OB UMMC

## 2025-04-29 PROCEDURE — 97161 PT EVAL LOW COMPLEX 20 MIN: CPT | Mod: GP

## 2025-04-29 PROCEDURE — 999N000111 HC STATISTIC OT IP EVAL DEFER

## 2025-04-29 PROCEDURE — 85014 HEMATOCRIT: CPT | Performed by: SURGERY

## 2025-04-29 PROCEDURE — 250N000013 HC RX MED GY IP 250 OP 250 PS 637

## 2025-04-29 PROCEDURE — 80048 BASIC METABOLIC PNL TOTAL CA: CPT | Performed by: SURGERY

## 2025-04-29 RX ORDER — ENOXAPARIN SODIUM 100 MG/ML
40 INJECTION SUBCUTANEOUS EVERY 24 HOURS
Status: DISCONTINUED | OUTPATIENT
Start: 2025-04-29 | End: 2025-04-30

## 2025-04-29 RX ORDER — SIMETHICONE 80 MG
80 TABLET,CHEWABLE ORAL 2 TIMES DAILY PRN
Status: DISCONTINUED | OUTPATIENT
Start: 2025-04-29 | End: 2025-05-01 | Stop reason: HOSPADM

## 2025-04-29 RX ADMIN — ENOXAPARIN SODIUM 40 MG: 40 INJECTION SUBCUTANEOUS at 16:24

## 2025-04-29 RX ADMIN — ACETAMINOPHEN 1000 MG: 500 TABLET ORAL at 04:24

## 2025-04-29 RX ADMIN — ACETAMINOPHEN 1000 MG: 500 TABLET ORAL at 16:24

## 2025-04-29 RX ADMIN — OXYCODONE HYDROCHLORIDE 10 MG: 10 TABLET ORAL at 08:08

## 2025-04-29 RX ADMIN — HEPARIN SODIUM 5000 UNITS: 5000 INJECTION, SOLUTION INTRAVENOUS; SUBCUTANEOUS at 08:08

## 2025-04-29 RX ADMIN — ACETAMINOPHEN 1000 MG: 500 TABLET ORAL at 10:50

## 2025-04-29 RX ADMIN — HYDROMORPHONE HYDROCHLORIDE 0.2 MG: 0.2 INJECTION, SOLUTION INTRAMUSCULAR; INTRAVENOUS; SUBCUTANEOUS at 16:25

## 2025-04-29 RX ADMIN — SIMETHICONE 80 MG: 80 TABLET, CHEWABLE ORAL at 19:58

## 2025-04-29 RX ADMIN — ACETAMINOPHEN 1000 MG: 500 TABLET ORAL at 22:22

## 2025-04-29 RX ADMIN — ALVIMOPAN 12 MG: 12 CAPSULE ORAL at 08:08

## 2025-04-29 RX ADMIN — OXYCODONE HYDROCHLORIDE 10 MG: 10 TABLET ORAL at 14:56

## 2025-04-29 ASSESSMENT — ACTIVITIES OF DAILY LIVING (ADL)
ADLS_ACUITY_SCORE: 34
ADLS_ACUITY_SCORE: 36
ADLS_ACUITY_SCORE: 34
ADLS_ACUITY_SCORE: 36
ADLS_ACUITY_SCORE: 34
ADLS_ACUITY_SCORE: 36
ADLS_ACUITY_SCORE: 34
ADLS_ACUITY_SCORE: 36
ADLS_ACUITY_SCORE: 34

## 2025-04-29 NOTE — PLAN OF CARE
"Goal Outcome Evaluation: 0700-1900    Plan of Care Reviewed With: patient    Overall Patient Progress: improving    Outcome Evaluation: POD 1    /79 (BP Location: Left arm)   Pulse 70   Temp 98.7  F (37.1  C) (Oral)   Resp 18   Ht 1.702 m (5' 7\")   Wt 100.4 kg (221 lb 4.8 oz)   SpO2 97%   BMI 34.66 kg/m       Aox4. VSS. On RA. Pain managed with agapito tylenol and prn oxycodone x2/dilaudid x1. Denies nausea. Ileostomy takedown site covered with a dressing, CDI. Passed some gas and had a small BM. Voiding spontaneously with AUOP. Tolerating diet. UAL.   "

## 2025-04-29 NOTE — PROGRESS NOTES
Colorectal Surgery Progress Note  [facility]  POD#1      Subjective:  pain relatively controlled.  Tolerating sips of clears and fulls.  No nausea.  No gas/stool.        Vitals:  Vitals:    04/28/25 2012 04/29/25 0011 04/29/25 0400 04/29/25 0734   BP: 125/79 113/70 107/72 110/73   BP Location: Left arm Left arm Left arm Left arm   Pulse: 65 63 62 67   Resp: 16 16 16 18   Temp: 98.1  F (36.7  C) 98.3  F (36.8  C) 98.1  F (36.7  C) 98.5  F (36.9  C)   TempSrc: Oral Oral Oral Oral   SpO2: 94% 94% 93% 97%   Weight:       Height:         I/O:  I/O last 3 completed shifts:  In: 1200 [P.O.:20; I.V.:1180]  Out: 715 [Urine:705; Blood:10]    Physical Exam:  Gen: AAOx3, NAD  Pulm: Non-labored breathing  Abd: Soft, mildly distended, appropriately tender, no guarding/rebound   Ostomy take down site - dressed  Ext:  Warm and well-perfused    BMP  Recent Labs   Lab 04/29/25  0717 04/29/25  0621 04/28/25  0908 04/22/25  1008   *  --   --  139   POTASSIUM 4.6  --   --  4.7   CHLORIDE 100  --   --  107   CO2 22  --   --  22   BUN 14.4  --   --  14.9   CR 0.88  --   --  0.94   * 132* 120* 113*   MAG 2.0  --   --   --    PHOS 4.0  --   --   --      CBC  Recent Labs   Lab 04/29/25  0717 04/22/25  1008   WBC 17.3* 11.2*   HGB 14.2 16.2   HCT 41.4 49.2    264         ASSESSMENT: This is a 49 year old male PMH rectal cancer s/p prior LAR.  Now s/p loop ileo take down with SB stapled side-to-side anastomosis on 4/28/2025.      Neuro/Pain: tylenol, oxy, IV dilaudid break thru  CV: no acute concerns at this time  PULM: encourage IS.  GI/FEN:   - reg diet as tolerated  - stop IVF  - replete electrolytes  - ambulate  : voiding spontaneously  Heme: Hgb 14 from baseline range of 13-16  ID: no acute concerns at this time  Endocrine: no acute concerns at this time    Activity: as tolerated.  Ppx: change from heparin to lovenox ppx  Dispo: 1-2 days pending ROBF.  No anticipated needs at the time  "of discharge.      Clinically Significant Risk Factors         # Hyponatremia: Lowest Na = 133 mmol/L in last 2 days, will monitor as appropriate                      # Obesity: Estimated body mass index is 34.22 kg/m  as calculated from the following:    Height as of this encounter: 1.702 m (5' 7\").    Weight as of this encounter: 99.1 kg (218 lb 7.6 oz)., PRESENT ON ADMISSION     # Financial/Environmental Concerns:           Laurence Servin PA-C ..................4/29/2025   8:40 AM  Colon and Rectal Surgery    Patient was seen and discussed with Dr. Riley    This plan of care was communicated to me by CRS Staff Dr. Frandy Kauffman.    Medical Decision Making       25 MINUTES SPENT BY ME on the date of service doing chart review, history, exam, documentation & further activities per the note.    88639 post op hospital visit        "

## 2025-04-29 NOTE — PLAN OF CARE
Occupational Therapy: Orders received. Chart reviewed and discussed with patient and care team.? Occupational Therapy not indicated due to pt with no acute OT needs, reporting IND w/ all ADL and mobility, has all recommended DME already in place from prior surgery (shower chair, dressing equipment, fww), and has excellent support from spouse who will assist as needed upon return home.?Discussed completion of ADL within precs, pt verbalizing understanding and comfort with compensatory strategies. Defer discharge recommendations to PT and care team.? Will complete orders.

## 2025-04-29 NOTE — PROGRESS NOTES
04/29/25 0846   Appointment Info   Signing Clinician's Name / Credentials (PT) Adelita Maradiaga DPT   Rehab Comments (PT) abd precautions, binder for comfort   Living Environment   People in Home spouse   Current Living Arrangements apartment   Home Accessibility stairs to enter home   Number of Stairs, Main Entrance greater than 10 stairs  (20)   Stair Railings, Main Entrance railings on both sides of stairs   Transportation Anticipated family or friend will provide   Living Environment Comments 24/7 assist prn, tub   Self-Care   Usual Activity Tolerance good   Equipment Currently Used at Home cane, straight;shower chair;cane, quad  (4WW)   Fall history within last six months no   Activity/Exercise/Self-Care Comment Indep PLOF   General Information   Onset of Illness/Injury or Date of Surgery 04/28/25   Referring Physician roxanna Kauffman   Patient/Family Therapy Goals Statement (PT) Return home   Pertinent History of Current Problem (include personal factors and/or comorbidities that impact the POC) S/p ileostomy takedown, parastomal hernia repair   Existing Precautions/Restrictions abdominal   Cognition   Affect/Mental Status (Cognition) WFL   Pain Assessment   Patient Currently in Pain   (6)   Integumentary/Edema   Integumentary/Edema Comments abdomen not assessed, binder in place   Posture    Posture Not impaired   Range of Motion (ROM)   Range of Motion ROM is WFL   Strength (Manual Muscle Testing)   Strength (Manual Muscle Testing) strength is WFL   Bed Mobility   Bed Mobility rolling left;supine-sit;sit-supine   Rolling Left [place] (Bed Mobility) modified independence   Supine-Sit [place] (Bed Mobility) modified independence   Sit-Supine [place] (Bed Mobility) modified independence   Transfers   Transfers sit-stand transfer   Sit-Stand Transfer   Sit-Stand [place] (Transfers) modified independence   Gait/Stairs (Locomotion)   [place] Level (Gait) modified independence    Assistive Device (Gait) other (see comments)   Distance in Feet (Gait) 200   Comment, (Gait/Stairs) 100 ft with IV pole support, 100 ft without IV pole support   Balance   Balance no deficits were identified   Coordination   Coordination no deficits were identified   Muscle Tone   Muscle Tone no deficits were identified   Clinical Impression   Criteria for Skilled Therapeutic Intervention Evaluation only   PT Diagnosis (PT) impaired functional mobility   Influenced by the following impairments pain   Functional limitations due to impairments impaired activity tolerance   Clinical Presentation (PT Evaluation Complexity) stable   Clinical Presentation Rationale clinical judgement   Clinical Decision Making (Complexity) low complexity   Planned Therapy Interventions (PT) bed mobility training;gait training;patient/family education;stair training;transfer training   Risk & Benefits of therapy have been explained evaluation/treatment results reviewed;care plan/treatment goals reviewed;patient   PT Total Evaluation Time   PT Eval, Low Complexity Minutes (40466) 17   Physical Therapy Goals   PT Frequency Other (see comments)  (Eval only)   PT Predicted Duration/Target Date for Goal Attainment 04/29/25   PT Goals Bed Mobility;Transfers;Gait;Stairs   PT: Bed Mobility Modified independent;Supine to/from sit;Rolling;Within precautions;Goal Met   PT: Transfers Modified independent;Sit to/from stand;Within precautions;Goal Met   PT: Gait Modified independent;100 feet;Within precautions;Goal Met   PT: Stairs Modified independent;10 stairs;Rail on right;Goal Met   PT Discharge Planning   PT Plan D/c PT   PT Discharge Recommendation (DC Rec) home with assist   PT Rationale for DC Rec Pt is cleared for home with assist of spouse as needed   PT Brief overview of current status mod I 200 ft, 10 stairs uni rail   PT Total Distance Amb During Session (feet) 200   Physical Therapy Time and Intention   Total Session Time (sum of timed  and untimed services) 17

## 2025-04-29 NOTE — PLAN OF CARE
Goal Outcome Evaluation:      Plan of Care Reviewed With: patient          Outcome Evaluation: 2128-6128    Significant 24 hour events:        Level of Care: Acute    Summary Type: 5A Post Op Report   POD: #1 = 4/29  Complications: none   Pain:  Controlled with PRN oxy x1  Neuro:WDL  CV:WDL  Resp: WDL  GI: WDL, except hypoactive bowels, no gas, no BM.   :WDL  Skin: .WDL except, integrity: old incision site, ileostomy takedown site  Activity Assistance: assistance, 1 people  Safety/Falls Risk: Level of Risk: Moderate Risk  Consults: PT/OT  Procedures/Imaging: none  Precautions:   Notes: Pt VSS and on RA. No reports of nausea or vomiting. No BM or gas overnight. Given oxy x1 and scheduled tylenol to help manage pain. Ileostomy takedown site JERZY, dressing dry and intact. Old abdominal scar.  Dry drainage noted on the dressing.  Good urine output overnight. Pt resting in between care, rise and fall of chest noted. Will continue to follow POC.

## 2025-04-30 LAB
ANION GAP SERPL CALCULATED.3IONS-SCNC: 6 MMOL/L (ref 7–15)
BUN SERPL-MCNC: 13.4 MG/DL (ref 6–20)
CALCIUM SERPL-MCNC: 8.2 MG/DL (ref 8.8–10.4)
CHLORIDE SERPL-SCNC: 101 MMOL/L (ref 98–107)
CREAT SERPL-MCNC: 0.79 MG/DL (ref 0.67–1.17)
EGFRCR SERPLBLD CKD-EPI 2021: >90 ML/MIN/1.73M2
ERYTHROCYTE [DISTWIDTH] IN BLOOD BY AUTOMATED COUNT: 14.3 % (ref 10–15)
GLUCOSE SERPL-MCNC: 126 MG/DL (ref 70–99)
HCO3 SERPL-SCNC: 25 MMOL/L (ref 22–29)
HCT VFR BLD AUTO: 33.5 % (ref 40–53)
HCT VFR BLD AUTO: 35 % (ref 40–53)
HGB BLD-MCNC: 11.2 G/DL (ref 13.3–17.7)
HGB BLD-MCNC: 11.8 G/DL (ref 13.3–17.7)
MAGNESIUM SERPL-MCNC: 1.8 MG/DL (ref 1.7–2.3)
MCH RBC QN AUTO: 30.6 PG (ref 26.5–33)
MCHC RBC AUTO-ENTMCNC: 33.7 G/DL (ref 31.5–36.5)
MCV RBC AUTO: 91 FL (ref 78–100)
PHOSPHATE SERPL-MCNC: 3.2 MG/DL (ref 2.5–4.5)
PLATELET # BLD AUTO: 250 10E3/UL (ref 150–450)
POTASSIUM SERPL-SCNC: 3.9 MMOL/L (ref 3.4–5.3)
RBC # BLD AUTO: 3.85 10E6/UL (ref 4.4–5.9)
SODIUM SERPL-SCNC: 132 MMOL/L (ref 135–145)
WBC # BLD AUTO: 13.3 10E3/UL (ref 4–11)

## 2025-04-30 PROCEDURE — 250N000011 HC RX IP 250 OP 636: Performed by: PHYSICIAN ASSISTANT

## 2025-04-30 PROCEDURE — 250N000013 HC RX MED GY IP 250 OP 250 PS 637: Performed by: PHYSICIAN ASSISTANT

## 2025-04-30 PROCEDURE — 250N000013 HC RX MED GY IP 250 OP 250 PS 637: Performed by: SURGERY

## 2025-04-30 PROCEDURE — 120N000002 HC R&B MED SURG/OB UMMC

## 2025-04-30 PROCEDURE — 80048 BASIC METABOLIC PNL TOTAL CA: CPT | Performed by: PHYSICIAN ASSISTANT

## 2025-04-30 PROCEDURE — 85018 HEMOGLOBIN: CPT | Performed by: PHYSICIAN ASSISTANT

## 2025-04-30 PROCEDURE — 999N000248 HC STATISTIC IV INSERT WITH US BY RN

## 2025-04-30 PROCEDURE — 84100 ASSAY OF PHOSPHORUS: CPT | Performed by: PHYSICIAN ASSISTANT

## 2025-04-30 PROCEDURE — 83735 ASSAY OF MAGNESIUM: CPT | Performed by: PHYSICIAN ASSISTANT

## 2025-04-30 PROCEDURE — 36415 COLL VENOUS BLD VENIPUNCTURE: CPT | Performed by: SURGERY

## 2025-04-30 PROCEDURE — 36415 COLL VENOUS BLD VENIPUNCTURE: CPT | Performed by: PHYSICIAN ASSISTANT

## 2025-04-30 PROCEDURE — 85018 HEMOGLOBIN: CPT | Performed by: SURGERY

## 2025-04-30 RX ORDER — MAGNESIUM SULFATE HEPTAHYDRATE 40 MG/ML
2 INJECTION, SOLUTION INTRAVENOUS ONCE
Status: COMPLETED | OUTPATIENT
Start: 2025-04-30 | End: 2025-04-30

## 2025-04-30 RX ORDER — CALCIUM GLUCONATE 20 MG/ML
1 INJECTION, SOLUTION INTRAVENOUS ONCE
Status: COMPLETED | OUTPATIENT
Start: 2025-04-30 | End: 2025-04-30

## 2025-04-30 RX ORDER — HYDROMORPHONE HCL IN WATER/PF 6 MG/30 ML
0.2 PATIENT CONTROLLED ANALGESIA SYRINGE INTRAVENOUS
Status: DISCONTINUED | OUTPATIENT
Start: 2025-04-30 | End: 2025-04-30

## 2025-04-30 RX ORDER — OXYCODONE HYDROCHLORIDE 5 MG/1
5-10 TABLET ORAL EVERY 6 HOURS PRN
Qty: 16 TABLET | Refills: 0 | Status: SHIPPED | OUTPATIENT
Start: 2025-04-30

## 2025-04-30 RX ORDER — POTASSIUM CHLORIDE 750 MG/1
20 TABLET, EXTENDED RELEASE ORAL ONCE
Status: COMPLETED | OUTPATIENT
Start: 2025-04-30 | End: 2025-04-30

## 2025-04-30 RX ORDER — OXYCODONE HYDROCHLORIDE 5 MG/1
5 TABLET ORAL PRN
Qty: 8 TABLET | Refills: 0 | Status: CANCELLED | OUTPATIENT
Start: 2025-04-30

## 2025-04-30 RX ORDER — ACETAMINOPHEN 500 MG
1000 TABLET ORAL EVERY 6 HOURS
Qty: 60 TABLET | Refills: 0 | Status: SHIPPED | OUTPATIENT
Start: 2025-04-30

## 2025-04-30 RX ORDER — ACETAMINOPHEN 500 MG
1000 TABLET ORAL EVERY 6 HOURS
Qty: 60 TABLET | Refills: 0 | Status: CANCELLED | OUTPATIENT
Start: 2025-04-30

## 2025-04-30 RX ADMIN — CALCIUM GLUCONATE 1 G: 20 INJECTION, SOLUTION INTRAVENOUS at 10:27

## 2025-04-30 RX ADMIN — ACETAMINOPHEN 1000 MG: 500 TABLET ORAL at 15:59

## 2025-04-30 RX ADMIN — CALCIUM GLUCONATE 1 G: 20 INJECTION, SOLUTION INTRAVENOUS at 08:13

## 2025-04-30 RX ADMIN — OXYCODONE HYDROCHLORIDE 10 MG: 10 TABLET ORAL at 22:11

## 2025-04-30 RX ADMIN — ACETAMINOPHEN 1000 MG: 500 TABLET ORAL at 04:13

## 2025-04-30 RX ADMIN — MAGNESIUM SULFATE HEPTAHYDRATE 2 G: 2 INJECTION, SOLUTION INTRAVENOUS at 11:46

## 2025-04-30 RX ADMIN — OXYCODONE HYDROCHLORIDE 10 MG: 10 TABLET ORAL at 00:42

## 2025-04-30 RX ADMIN — ACETAMINOPHEN 1000 MG: 500 TABLET ORAL at 10:27

## 2025-04-30 RX ADMIN — ACETAMINOPHEN 1000 MG: 500 TABLET ORAL at 22:11

## 2025-04-30 RX ADMIN — OXYCODONE HYDROCHLORIDE 10 MG: 10 TABLET ORAL at 15:59

## 2025-04-30 RX ADMIN — POTASSIUM CHLORIDE 20 MEQ: 750 TABLET, EXTENDED RELEASE ORAL at 10:27

## 2025-04-30 RX ADMIN — OXYCODONE HYDROCHLORIDE 10 MG: 10 TABLET ORAL at 08:13

## 2025-04-30 ASSESSMENT — ACTIVITIES OF DAILY LIVING (ADL)
ADLS_ACUITY_SCORE: 34
ADLS_ACUITY_SCORE: 34
ADLS_ACUITY_SCORE: 36
ADLS_ACUITY_SCORE: 34
ADLS_ACUITY_SCORE: 36
ADLS_ACUITY_SCORE: 34
ADLS_ACUITY_SCORE: 34
ADLS_ACUITY_SCORE: 36
ADLS_ACUITY_SCORE: 36
ADLS_ACUITY_SCORE: 34
ADLS_ACUITY_SCORE: 34
ADLS_ACUITY_SCORE: 36
ADLS_ACUITY_SCORE: 34
ADLS_ACUITY_SCORE: 36
ADLS_ACUITY_SCORE: 34

## 2025-04-30 NOTE — PLAN OF CARE
"/76 (BP Location: Left arm)   Pulse 72   Temp 98.9  F (37.2  C) (Oral)   Resp 16   Ht 1.702 m (5' 7\")   Wt 100.4 kg (221 lb 4.8 oz)   SpO2 94%   BMI 34.66 kg/m     Time: 6434-2687  Reason for admission: Ileostomy takedown  Activity: independent.   Pain: C/o ABD area pain, patient received PRN, simethicone , Oxycodone,  and scheduled Tylenol, and it was effective.   Neuro: alert and oriented.   Cardiac: WDL  Resp: denied SOB, lung sounds clear bilaterally.   GI/: on regular diet, voids without difficulties, bowel sounds present x four quadrants.   Lines: PIV, saline locked.   Skin: ABD ileostomy takedown site, dressing intact.   Labs/Imaging: no lab  or imaging this shift.   New changes to shift: no change.   Plan: continue with current plan of cares.   "

## 2025-04-30 NOTE — PLAN OF CARE
0700 - 1500    VSS. Pt c/o abdominal pain - oxy given x1. Pt denied SOB/n/v. Good appetite. 2 loose BMs today - bright/dark red - providers aware. Pt denies feeling dizzy and lightheaded. No significant events, continue POC.

## 2025-04-30 NOTE — PROGRESS NOTES
Colorectal Surgery Progress Note  [facility]  POD#2      Subjective:  NAEON. Pt states having gas and bowel movements with some blood. He is not having any nausea and eating/drinking fine. States pain is not bad. Would like to wait to discharge until tomorrow.     Vitals:  Vitals:    04/29/25 1725 04/29/25 2101 04/30/25 0035 04/30/25 0415   BP: 121/79 114/76 111/69 107/70   BP Location: Left arm Left arm Left arm Left arm   Pulse: 70 72 74 80   Resp: 18 16 16 18   Temp: 98.7  F (37.1  C) 98.9  F (37.2  C) 98.3  F (36.8  C) 98.9  F (37.2  C)   TempSrc: Oral Oral Oral Oral   SpO2: 97% 94% 96% 95%   Weight:       Height:         I/O:  I/O last 3 completed shifts:  In: 360 [P.O.:360]  Out: 500 [Urine:500]    Physical Exam:  Gen: AAOx3, NAD  Pulm: Non-labored breathing  Abd: Soft, appropriately tender, no guarding, minimally distended   Ostomy wound take down site - dressings will be changed later  Ext:  Warm and well-perfused    BMP  Recent Labs   Lab 04/29/25  0717 04/29/25  0621 04/28/25  0908   *  --   --    POTASSIUM 4.6  --   --    CHLORIDE 100  --   --    CO2 22  --   --    BUN 14.4  --   --    CR 0.88  --   --    * 132* 120*   MAG 2.0  --   --    PHOS 4.0  --   --      CBC  Recent Labs   Lab 04/29/25  0717   WBC 17.3*   HGB 14.2   HCT 41.4            ASSESSMENT: This is a 49 year old male PMH rectal cancer s/p prior LAR.  Now s/p loop ileostomy take down with SB stapled side-to-side anastomosis on 4/28/2025.      Neuro/Pain: tylenol, oxy  CV: no acute concerns at this time  PULM: encourage IS.  GI/FEN:   - continue reg diet  - ambulate  : voiding spontaneously  Heme: Hgb pending today.  (Range 13-16)  ID: no acute concerns at this time  Endocrine: no acute concerns at this time     Activity: as tolerated.  Ppx: lovenox ppx  Dispo: possibly discharge tomorrow depending on CBC and resolution of hematochezia. No anticipated needs at the time of discharge.       Laurence Servin PA-C ..................4/30/2025   7:20 AM  Colon and Rectal Surgery    Patient was seen and discussed with Dr. Riley    This plan of care was communicated to me by CRS Staff Dr. Frandy Kauffman.    Medical Decision Making       25 MINUTES SPENT BY ME on the date of service doing chart review, history, exam, documentation & further activities per the note.    54740 post op hospital visit

## 2025-04-30 NOTE — DISCHARGE SUMMARY
[facility]  Discharge Summary  Colon and Rectal Surgery     Bella Quintero MRN# [id number]   YOB: 1975 Age: 49 year old     Date of Admission:  4/28/2025  Date of Discharge::  5/1/2025  Admitting Physician:  Frandy Kauffman MD  Discharge Physician:  Frandy Kauffman MD  Primary Care Physician:        Nyla Pastor          Admission Diagnoses:   Ileostomy status (H) [Z93.2]  Hx of ileostomy [Z98.890]          Discharge Diagnosis:   Hx of ileostomy [Z98.890]         Procedures:   Loop ileostomy take down with small bowel stapled side-to-side anastomosis, and repaired parastomal hernia.             Medications Prior to Admission:     Medications Prior to Admission   Medication Sig Dispense Refill Last Dose/Taking    ketoconazole (NIZORAL) 2 % external shampoo Apply topically daily as needed for itching or irritation 100 mL 2 Unknown            Discharge Medications:     Current Discharge Medication List        START taking these medications    Details   acetaminophen (TYLENOL) 500 MG tablet Take 2 tablets (1,000 mg) by mouth every 6 hours.  Qty: 60 tablet, Refills: 0    Associated Diagnoses: Acute post-operative pain      oxyCODONE (ROXICODONE) 5 MG tablet Take 1-2 tablets (5-10 mg) by mouth every 6 hours as needed for severe pain.  Qty: 16 tablet, Refills: 0    Associated Diagnoses: Acute post-operative pain           CONTINUE these medications which have NOT CHANGED    Details   ketoconazole (NIZORAL) 2 % external shampoo Apply topically daily as needed for itching or irritation  Qty: 100 mL, Refills: 2    Associated Diagnoses: Folliculitis                 Brief History of Illness:   Bella Quintero is a 49 year old male PMH rectosigmoid adenocarcinoma s/p prior LAR with diverting loop ileostomy on 11/21/2024. His postoperative course was notable for SBO and abdominal wall cellulitis requiring admission in December 2024. He was treated with  "antibiotics. He recently completed follow-up with oncology and colorectal surgery. Now s/p loop ileostomy take down with SB stapled side-to-side anastomosis on 4/28/2025.            Hospital Course:   Post-operatively pt was gently fluid resuscitated. Warren was removed and pt was able to void. Pt had eventual return of bowel function and was able to tolerate small amounts of a regular diet.     Pt was having some bloody stools. Hgb trended and was stable at Hgb 10.9. Bloody stools did begin to decrease at the time of discharge.     Post-operative pain was controlled with scheduled tylenol and prn oxycodone at time of discharge.    Patient is to follow up in the Colon and Rectal Surgery Clinic in 2-3 week with Preethi Newman NP or Klarissa Navarro PA-C and then with Dr. Kauffman in 2-3 weeks after.          Day of Discharge Physical Exam:   Blood pressure 118/74, pulse 71, temperature 98.4  F (36.9  C), temperature source Oral, resp. rate 18, height 1.702 m (5' 7\"), weight 100.3 kg (221 lb 3.2 oz), SpO2 97%.    Gen: AAOx3, NAD  Pulm: Non-labored breathing on RA  CV: RRR to peripheral palpation, warm and well perfused peripherally  Abd: Soft, appropriately tender, no guarding, minimally distended               Ostomy wound take down site with serosanguinous drainage, re packed at bedside  Ext:  Warm and well-perfused         Discharge Instructions and Follow-Up:     Discharge Procedure Orders   Reason for your hospital stay   Order Comments: PROCEDURE:  1. Loop ileostomy takedown.  2. Small bowel, stapled, side-to-side anastomosis.  3. Repair of abdominal wall at ileostomy site.     Activity   Order Comments: ACTIVITY  - No lifting, pushing, pulling greater than 10 lbs for 6 weeks after surgery.  - No strenuous exercise for 6 weeks after surgery.  - We encourage walking at least 4-5 times per day.  - Do not drive while taking narcotic pain medication (such as Percocet, oxycodone, Vicodin).  - No driving until " you are able to fully twist to both sides or slam on brakes quickly and without any pain.     Order Specific Question Answer Comments   Is discharge order? Yes      ADULT [facility] Specialty Follow-up and recommended labs and tests   Order Comments: Routine, WOUND CARE   -Inspect your wounds daily for signs of infection (increased redness, drainage, pain)   -Keep your wound clean and dry   -You may shower, but do not soak in tub or pool     Please contact Rosmery PAYAN RN, Sole RAMOS RN, or Maura BAEZ RN at [phone] for problems after discharge such as:   -Temperature > 101F, chills, rigors, dizziness   -Redness around or purulent drainage from wound   -Inability to tolerate diet, nausea or vomiting   -You stop passing gas, develop significant bloating, abdominal pain -Have blood in stools/vomit -Have severe diarrhea/constipation   -Any other questions or concerns.   - At nights (after 4:30pm), on weekends, or if urgent, call [phone] and ask the  to speak with the on-call Colorectal Surgery resident or fellow Medication Instructions Some of your medications may have changed.   - Please take only prescribed and resumed medications     FOLLOW-UP 1. You will need to follow-up with Preethi Newman NP or Klarissa Navarro PA-C in the Colon and Rectal Surgery clinic in 2-3 week(s) and then with CRS Staff: Dr. Frandy Kauffman in 4-5 weeks after. Please contact our Nurses (phone # [phone]) if you have not heard from our clinic in 3 business days afer discharge to schedule a follow-up appointment.       Appointments on [place] and/or [facility] (with [facility] or [facility] provider or service). Call [phone] if you haven't heard regarding these appointments within 7 days of discharge.     Wound care and dressings   Order Comments: Instructions to care for your wound at home:   Cover your wound with gauze and secure in place with medical adhesive.   Change the dressing 2 times per day and more as needed for  drainage/saturation/showering.     Diet   Order Comments: Follow this diet upon discharge:   DIET  - Follow a regular diet after discharge.  - Eat slowly, chew thoroughly, and have small frequent meals throughout the day.  - Stay well hydrated.     Order Specific Question Answer Comments   Is discharge order? Yes             Home Health Care:     Not needed           Discharge Disposition:     Discharged to home      Condition at discharge: Stable      Laurence Servin PA-C ..................5/1/2025   7:40 AM  Colon and Rectal Surgery     Pt was seen and discussed with Dr. Kauffman on 5/1/2025.     The above plan of care was performed and communicated to me by CRS Staff: Dr. Kauffman.     Medical Decision Making       35 MINUTES SPENT BY ME on the date of service doing chart review, history, exam, documentation & further activities per the note.      10784 post op hospital visit

## 2025-05-01 VITALS
SYSTOLIC BLOOD PRESSURE: 118 MMHG | RESPIRATION RATE: 18 BRPM | TEMPERATURE: 98.4 F | BODY MASS INDEX: 34.72 KG/M2 | DIASTOLIC BLOOD PRESSURE: 74 MMHG | OXYGEN SATURATION: 97 % | HEIGHT: 67 IN | WEIGHT: 221.2 LBS | HEART RATE: 71 BPM

## 2025-05-01 LAB
ANION GAP SERPL CALCULATED.3IONS-SCNC: 9 MMOL/L (ref 7–15)
BUN SERPL-MCNC: 12.5 MG/DL (ref 6–20)
CALCIUM SERPL-MCNC: 8.2 MG/DL (ref 8.8–10.4)
CHLORIDE SERPL-SCNC: 99 MMOL/L (ref 98–107)
CREAT SERPL-MCNC: 0.78 MG/DL (ref 0.67–1.17)
EGFRCR SERPLBLD CKD-EPI 2021: >90 ML/MIN/1.73M2
ERYTHROCYTE [DISTWIDTH] IN BLOOD BY AUTOMATED COUNT: 14.2 % (ref 10–15)
GLUCOSE SERPL-MCNC: 121 MG/DL (ref 70–99)
HCO3 SERPL-SCNC: 24 MMOL/L (ref 22–29)
HCT VFR BLD AUTO: 33 % (ref 40–53)
HGB BLD-MCNC: 10.9 G/DL (ref 13.3–17.7)
HGB BLD-MCNC: 10.9 G/DL (ref 13.3–17.7)
MAGNESIUM SERPL-MCNC: 1.9 MG/DL (ref 1.7–2.3)
MCH RBC QN AUTO: 30 PG (ref 26.5–33)
MCHC RBC AUTO-ENTMCNC: 33 G/DL (ref 31.5–36.5)
MCV RBC AUTO: 91 FL (ref 78–100)
PHOSPHATE SERPL-MCNC: 2.7 MG/DL (ref 2.5–4.5)
PLATELET # BLD AUTO: 263 10E3/UL (ref 150–450)
POTASSIUM SERPL-SCNC: 4.1 MMOL/L (ref 3.4–5.3)
RBC # BLD AUTO: 3.63 10E6/UL (ref 4.4–5.9)
SODIUM SERPL-SCNC: 132 MMOL/L (ref 135–145)
WBC # BLD AUTO: 12.2 10E3/UL (ref 4–11)

## 2025-05-01 PROCEDURE — 250N000011 HC RX IP 250 OP 636: Performed by: PHYSICIAN ASSISTANT

## 2025-05-01 PROCEDURE — 84100 ASSAY OF PHOSPHORUS: CPT | Performed by: PHYSICIAN ASSISTANT

## 2025-05-01 PROCEDURE — 83735 ASSAY OF MAGNESIUM: CPT | Performed by: PHYSICIAN ASSISTANT

## 2025-05-01 PROCEDURE — 250N000013 HC RX MED GY IP 250 OP 250 PS 637

## 2025-05-01 PROCEDURE — 85018 HEMOGLOBIN: CPT | Performed by: PHYSICIAN ASSISTANT

## 2025-05-01 PROCEDURE — 250N000013 HC RX MED GY IP 250 OP 250 PS 637: Performed by: SURGERY

## 2025-05-01 PROCEDURE — 250N000013 HC RX MED GY IP 250 OP 250 PS 637: Performed by: PHYSICIAN ASSISTANT

## 2025-05-01 PROCEDURE — 36415 COLL VENOUS BLD VENIPUNCTURE: CPT | Performed by: PHYSICIAN ASSISTANT

## 2025-05-01 PROCEDURE — 80048 BASIC METABOLIC PNL TOTAL CA: CPT | Performed by: PHYSICIAN ASSISTANT

## 2025-05-01 RX ORDER — MAGNESIUM SULFATE HEPTAHYDRATE 40 MG/ML
2 INJECTION, SOLUTION INTRAVENOUS ONCE
Status: COMPLETED | OUTPATIENT
Start: 2025-05-01 | End: 2025-05-01

## 2025-05-01 RX ORDER — CALCIUM GLUCONATE 20 MG/ML
2 INJECTION, SOLUTION INTRAVENOUS ONCE
Status: COMPLETED | OUTPATIENT
Start: 2025-05-01 | End: 2025-05-01

## 2025-05-01 RX ADMIN — ACETAMINOPHEN 1000 MG: 500 TABLET ORAL at 04:49

## 2025-05-01 RX ADMIN — SODIUM PHOSPHATE, DIBASIC, ANHYDROUS, POTASSIUM PHOSPHATE, MONOBASIC, AND SODIUM PHOSPHATE, MONOBASIC, MONOHYDRATE 500 MG: 852; 155; 130 TABLET, COATED ORAL at 07:56

## 2025-05-01 RX ADMIN — ACETAMINOPHEN 1000 MG: 500 TABLET ORAL at 11:23

## 2025-05-01 RX ADMIN — CALCIUM GLUCONATE 2 G: 20 INJECTION, SOLUTION INTRAVENOUS at 07:56

## 2025-05-01 RX ADMIN — OXYCODONE HYDROCHLORIDE 10 MG: 10 TABLET ORAL at 11:23

## 2025-05-01 RX ADMIN — SIMETHICONE 80 MG: 80 TABLET, CHEWABLE ORAL at 04:49

## 2025-05-01 RX ADMIN — OXYCODONE HYDROCHLORIDE 10 MG: 10 TABLET ORAL at 04:49

## 2025-05-01 RX ADMIN — MAGNESIUM SULFATE HEPTAHYDRATE 2 G: 2 INJECTION, SOLUTION INTRAVENOUS at 09:15

## 2025-05-01 ASSESSMENT — ACTIVITIES OF DAILY LIVING (ADL)
ADLS_ACUITY_SCORE: 36

## 2025-05-01 NOTE — PLAN OF CARE
Goal Outcome Evaluation:    3017-8869    Plan of Care Reviewed With: patient    Overall Patient Progress: no change    Significant 24 hour events: multiple stools with bright red blood, vitally stable, provider aware. Continue to monitor and notify MD if he becomes hypotensive/tachy     Level of Care: Acute    Summary Type: 5A Post Op Report   POD: #2 = 4/30  Complications: none   Pain:  Controlled with PRN oxy x2  Neuro:WDL  CV:WDL  Resp: WDL  GI: WDL, except 3x stools with bright red blood  :WDL  Skin: .WDL except, integrity: old incision site, ileostomy takedown site  Activity Assistance: independent  Safety/Falls Risk: Level of Risk: Moderate Risk  Consults: PT/OT  Procedures/Imaging: none

## 2025-05-01 NOTE — PLAN OF CARE
Nursing Focus: Discharge    D: Patient discharged to home at 1400.     I: Discharge prescriptions sent to discharge pharmacy to be filled. All discharge medications and instructions reviewed with him by virtual RN. Patient instructed to call clinic triage nurse if he experiences a fever >100.4, uncontrolled nausea, vomiting, diarrhea, or pain; or experiences any signs or symptoms of bleeding. Other phone numbers to call with questions or concerns after discharge reviewed. PIV removed. Education completed.    A: He verbalized understanding of discharge medications and instructions. Patient will  medications at discharge pharmacy.     P: Patient to follow-up in clinic.

## 2025-05-01 NOTE — PROGRESS NOTES
SPIRITUAL HEALTH SERVICES   [facility] [address]     Summary: Saw Bella Quintero per admission indication that spiritual life informs care.     Pt feels well supported and states his spiritual care needs are met.  [place] is hopeful for discharge today.     Oriented to availability of Spiritual Health Services.     Plan: Pt anticipating discharge. No follow up.     Rashaad Cunha MA  Associate Staff   [facility] remains available 24/7 for emergent requests/referrals, either by having the on-call  paged   or by entering an ASAP/STAT consult in Epic (this will also page the on-call ).   Routine Epic consults receive an initial response within 24 hours.

## 2025-05-01 NOTE — PLAN OF CARE
Goal Outcome Evaluation:      Plan of Care Reviewed With: patient    Overall Patient Progress: no change    Outcome Evaluation: POD#2 4/30    1790-1611    AVSS on RA, denies SOB, n/v  Pain 7/10, managed with PRM oxy x1  Voiding adequately, passing gas, no episodes of bloody stool overnight  RLQ ileostomy take down site CLD  Pt UAL  Possible discharge 5/1 pending CBC and resolution of bloody stool  No acute events overnight, continue with POC

## 2025-05-01 NOTE — PROVIDER NOTIFICATION
Provider notification;    Doctor Quan Herrera notified at [address].    Reason for notification Patient had another stool with bright red blood, vital signs remain stable    Plan Continue to monitor

## 2025-05-02 LAB
PATH REPORT.COMMENTS IMP SPEC: NORMAL
PATH REPORT.COMMENTS IMP SPEC: NORMAL
PATH REPORT.FINAL DX SPEC: NORMAL
PATH REPORT.GROSS SPEC: NORMAL
PATH REPORT.MICROSCOPIC SPEC OTHER STN: NORMAL
PATH REPORT.RELEVANT HX SPEC: NORMAL
PHOTO IMAGE: NORMAL

## 2025-05-05 ENCOUNTER — PATIENT OUTREACH (OUTPATIENT)
Dept: CARE COORDINATION | Facility: CLINIC | Age: 50
End: 2025-05-05
Payer: COMMERCIAL

## 2025-05-05 NOTE — PROGRESS NOTES
Clinic Care Coordination Contact  Situation: Patient chart reviewed by RN.    Background: Patient identified via recently discharged list.     Assessment: Per chart review, patient had colon rectal surgery team reach out x2 for TCM call- no success in reaching patient but 2 attempts were made.    Plan/Recommendations: RN will not complete TCM call due to duplication of outreach. Pt does have appropriate follow ups scheduled.     VAUGHN CARRILLO RN on 5/5/2025 at 9:59 AM

## 2025-05-13 ENCOUNTER — OFFICE VISIT (OUTPATIENT)
Dept: SURGERY | Facility: CLINIC | Age: 50
End: 2025-05-13
Payer: COMMERCIAL

## 2025-05-13 VITALS
BODY MASS INDEX: 34.37 KG/M2 | OXYGEN SATURATION: 97 % | WEIGHT: 219 LBS | HEART RATE: 98 BPM | SYSTOLIC BLOOD PRESSURE: 143 MMHG | DIASTOLIC BLOOD PRESSURE: 88 MMHG | HEIGHT: 67 IN

## 2025-05-13 DIAGNOSIS — Z09 FOLLOW-UP EXAMINATION AFTER COLORECTAL SURGERY: Primary | ICD-10-CM

## 2025-05-13 PROCEDURE — 3079F DIAST BP 80-89 MM HG: CPT

## 2025-05-13 PROCEDURE — 3077F SYST BP >= 140 MM HG: CPT

## 2025-05-13 PROCEDURE — 99024 POSTOP FOLLOW-UP VISIT: CPT

## 2025-05-13 PROCEDURE — 1126F AMNT PAIN NOTED NONE PRSNT: CPT

## 2025-05-13 ASSESSMENT — PAIN SCALES - GENERAL: PAINLEVEL_OUTOF10: NO PAIN (0)

## 2025-05-13 NOTE — NURSING NOTE
"Chief Complaint   Patient presents with    Post-op Visit       Vitals:    05/13/25 1005   BP: (!) 143/88   BP Location: Right arm   Patient Position: Sitting   Cuff Size: Adult Large   Pulse: 98   SpO2: 97%   Weight: 219 lb   Height: 5' 7\"       Body mass index is 34.3 kg/m .    Linda Diaz CMA    "

## 2025-05-13 NOTE — PATIENT INSTRUCTIONS
Continue with the protein shakes.   Follow up with Dr. Kauffman already scheduled for 6/3  Continue weight restrictions. No more than 10 pounds for total of 6 weeks post op.

## 2025-05-13 NOTE — LETTER
"2025       RE: Bella Quintero  [address]  [facility] [address]     Dear Colleague,    Thank you for referring your patient, Bella Quintero, to the [facility] COLON AND RECTAL SURGERY CLINIC [place] at [facility]. Please see a copy of my visit note below.    Colon and Rectal Surgery Postoperative Clinic Note    RE: Bella Quintero  : 1975  IVETH: 2025    Bella Quintero is a very pleasant 49 year old male now status post loop ileostomy take down, small bowel stapled side-to-side anastomosis, repair of abdominal wall ileostomy site 25 performed by Dr. Kauffman.     Interval history: doing well. Having soft bm 3 times daily. Taking protein shake 1-2 times a day depending on protein intake from other sources. Having little to no pain. No nausea or vomiting.     Physical Examination:   BP (!) 143/88 (BP Location: Right arm, Patient Position: Sitting, Cuff Size: Adult Large)   Pulse 98   Ht 5' 7\"   Wt 219 lb   SpO2 97%   BMI 34.30 kg/m    General: alert, oriented, in no acute distress, sitting comfortably  Respiratory: non-labored breathing on RA  Abdomen: soft, non-distended, ileostomy site healed, silver nitrate applied to a little area with hypergranulation tissue       Assessment/Plan:  49 year old male status post loop ileostomy take down, small bowel stapled side-to-side anastomosis, repair of abdominal wall ileostomy site 25 performed by Dr. Kauffman. Healing well. Continue lifting restrictions: no more than 10 pounds for total of 6 weeks post op. Already scheduled to see DR. Kauffman in clinic. Patient is agreeable to this plan. Answered all his questions to his satisfaction. Encouraged to reach out with any additional questions or concerns.       Medical history:  Past Medical History:   Diagnosis Date     Bilateral low back pain with right-sided sciatica      Diverticulitis of colon 2024     " History of alcohol use disorder      HLD (hyperlipidemia)      Obesity      Tobacco use disorder        Surgical history:  Past Surgical History:   Procedure Laterality Date     COLONOSCOPY N/A 11/11/2024    Procedure: COLONOSCOPY, WITH POLYPECTOMY AND BIOPSY;  Surgeon: Ranjan Verdin MD;  Location: [facility]     DAVINCI ASSISTED RESECTION LOW ANTERIOR N/A 11/21/2024    Procedure: ROBOT-ASSISTED, CONVERTED TO OPEN, PROCTECTOMY, DIVERTING LOOP ILEOSTOMY; repair of umbilical hernia;  Surgeon: Frandy Kauffman MD;  Location: [facility]     INSERT STENT URETER Bilateral 11/21/2024    Procedure: Cystoscopy, Temporary Insert Stent Bilateral Ureter;  Surgeon: Xavier Art MD;  Location: [facility]     RESECTION ABDOMINAL PERINEAL N/A 11/21/2024    Procedure: OPEN PROCTECTOMY, DIVERTING LOOP ILEOSTOMY; repair of umbilical hernia;  Surgeon: Frandy Kauffman MD;  Location[facility]     SIGMOIDOSCOPY FLEXIBLE N/A 11/21/2024    Procedure: Sigmoidoscopy flexible;  Surgeon: Frandy Kauffman MD;  Location[facility]     TAKEDOWN ILEOSTOMY N/A 4/28/2025    Procedure: CLOSURE, ILEOSTOMY. repaired parastomal hernia.;  Surgeon: Frandy Kauffman MD;  Location: [facility]       Problem list:    Patient Active Problem List    Diagnosis Date Noted     Hx of ileostomy 04/28/2025     Priority: Medium     Small bowel obstruction (H) 12/04/2024     Priority: Medium     Abdominal wall cellulitis 12/04/2024     Priority: Medium     Acute kidney failure, unspecified 11/25/2024     Priority: Medium     Malignant neoplasm of rectosigmoid junction (H) 11/21/2024     Priority: Medium     Acute colitis 07/12/2024     Priority: Medium     Left lower quadrant abdominal pain 07/12/2024     Priority: Medium     Accidental fentanyl overdose (H) 06/06/2023     Priority: Medium     Acute respiratory failure with hypoxia (H) 06/06/2023     Priority: Medium     Aspiration pneumonitis (H) 06/06/2023     Priority: Medium     Hypotension 06/06/2023     Priority: Medium     Closed  fracture of distal phalanx or phalanges of hand 07/10/2013     Priority: Medium     Scabies 01/12/2011     Priority: Medium       Medications:  Current Outpatient Medications   Medication Sig Dispense Refill     acetaminophen (TYLENOL) 500 MG tablet Take 2 tablets (1,000 mg) by mouth every 6 hours. 60 tablet 0     ketoconazole (NIZORAL) 2 % external shampoo Apply topically daily as needed for itching or irritation 100 mL 2     oxyCODONE (ROXICODONE) 5 MG tablet Take 1-2 tablets (5-10 mg) by mouth every 6 hours as needed for severe pain. (Patient not taking: Reported on 5/13/2025) 16 tablet 0       Allergies:  Allergies   Allergen Reactions     Codeine Swelling and Itching     PN: LW Reaction: EDEMA, GENERALIZED   Tolerated hydromorphone 5/1/24     Penicillins Other (See Comments)     Swelling, burning feeling in hands and feet. , PN: LW Reaction: EDEMA, GENERALIZED       Family history:  Family History   Problem Relation Age of Onset     Colon Polyps Mother      Alcoholism Father      Diabetes Father      Colon Cancer No family hx of      Anesthesia Reaction No family hx of      Deep Vein Thrombosis (DVT) No family hx of      Clotting Disorder No family hx of        Social history:  Social History     Tobacco Use     Smoking status: Every Day     Current packs/day: 1.00     Average packs/day: 1 pack/day for 30.8 years (30.8 ttl pk-yrs)     Types: Cigarettes     Start date: 7/9/1994     Smokeless tobacco: Never     Tobacco comments:     4/22/25 working on quitting, down to 3 cigarettes daily   Substance Use Topics     Alcohol use: Not Currently     Alcohol/week: 15.0 standard drinks of alcohol     Types: 15 Glasses of wine per week     Comment: Last alcohol months ago as of 4/22/25     Marital status: .  Occupation: n/a.    Nursing Notes:   Linda Diaz  5/13/2025 10:19 AM  Signed  Chief Complaint   Patient presents with     Post-op Visit       Vitals:    05/13/25 1005   BP: (!) 143/88   BP Location: Right arm  "  Patient Position: Sitting   Cuff Size: Adult Large   Pulse: 98   SpO2: 97%   Weight: 219 lb   Height: 5' 7\"       Body mass index is 34.3 kg/m .    Linda Diaz CMA       20 minutes spent on the date of the encounter doing chart review, history and exam, documentation and further activities as noted above.   This is a postop visit.    Adelina Johnson PA-C  Colon and Rectal Surgery  [facility]    This note was created using speech recognition software and may contain unintended word substitutions.        Again, thank you for allowing me to participate in the care of your patient.      Sincerely,    Adelina Johnson PA-C    "

## 2025-05-13 NOTE — PROGRESS NOTES
"Colon and Rectal Surgery Postoperative Clinic Note    RE: Bella Quintero  : 1975  IVETH: 2025    Bella Quintero is a very pleasant 49 year old male now status post loop ileostomy take down, small bowel stapled side-to-side anastomosis, repair of abdominal wall ileostomy site 25 performed by Dr. Kauffman.     Interval history: doing well. Having soft bm 3 times daily. Taking protein shake 1-2 times a day depending on protein intake from other sources. Having little to no pain. No nausea or vomiting.     Physical Examination:   BP (!) 143/88 (BP Location: Right arm, Patient Position: Sitting, Cuff Size: Adult Large)   Pulse 98   Ht 5' 7\"   Wt 219 lb   SpO2 97%   BMI 34.30 kg/m    General: alert, oriented, in no acute distress, sitting comfortably  Respiratory: non-labored breathing on RA  Abdomen: soft, non-distended, ileostomy site healed, silver nitrate applied to a little area with hypergranulation tissue       Assessment/Plan:  49 year old male status post loop ileostomy take down, small bowel stapled side-to-side anastomosis, repair of abdominal wall ileostomy site 25 performed by Dr. Kauffman. Healing well. Continue lifting restrictions: no more than 10 pounds for total of 6 weeks post op. Already scheduled to see DR. Kauffman in clinic. Patient is agreeable to this plan. Answered all his questions to his satisfaction. Encouraged to reach out with any additional questions or concerns.       Medical history:  Past Medical History:   Diagnosis Date    Bilateral low back pain with right-sided sciatica     Diverticulitis of colon 2024    History of alcohol use disorder     HLD (hyperlipidemia)     Obesity     Tobacco use disorder        Surgical history:  Past Surgical History:   Procedure Laterality Date    COLONOSCOPY N/A 2024    Procedure: COLONOSCOPY, WITH POLYPECTOMY AND BIOPSY;  Surgeon: Ranjan Verdin MD;  Location: [facility]    DAVINCI ASSISTED RESECTION LOW " ANTERIOR N/A 11/21/2024    Procedure: ROBOT-ASSISTED, CONVERTED TO OPEN, PROCTECTOMY, DIVERTING LOOP ILEOSTOMY; repair of umbilical hernia;  Surgeon: Frandy Kauffman MD;  [facility]    INSERT STENT URETER Bilateral 11/21/2024    Procedure: Cystoscopy, Temporary Insert Stent Bilateral Ureter;  Surgeon: Xavier Art MD;  [facility]    RESECTION ABDOMINAL PERINEAL N/A 11/21/2024    Procedure: OPEN PROCTECTOMY, DIVERTING LOOP ILEOSTOMY; repair of umbilical hernia;  Surgeon: Frandy Kauffman MD;  [facility]    SIGMOIDOSCOPY FLEXIBLE N/A 11/21/2024    Procedure: Sigmoidoscopy flexible;  Surgeon: Frandy Kauffman MD;  [facility]    TAKEDOWN ILEOSTOMY N/A 4/28/2025    Procedure: CLOSURE, ILEOSTOMY. repaired parastomal hernia.;  Surgeon: Frandy Kauffman MD;  Location: [facility]       Problem list:    Patient Active Problem List    Diagnosis Date Noted    Hx of ileostomy 04/28/2025     Priority: Medium    Small bowel obstruction (H) 12/04/2024     Priority: Medium    Abdominal wall cellulitis 12/04/2024     Priority: Medium    Acute kidney failure, unspecified 11/25/2024     Priority: Medium    Malignant neoplasm of rectosigmoid junction (H) 11/21/2024     Priority: Medium    Acute colitis 07/12/2024     Priority: Medium    Left lower quadrant abdominal pain 07/12/2024     Priority: Medium    Accidental fentanyl overdose (H) 06/06/2023     Priority: Medium    Acute respiratory failure with hypoxia (H) 06/06/2023     Priority: Medium    Aspiration pneumonitis (H) 06/06/2023     Priority: Medium    Hypotension 06/06/2023     Priority: Medium    Closed fracture of distal phalanx or phalanges of hand 07/10/2013     Priority: Medium    Scabies 01/12/2011     Priority: Medium       Medications:  Current Outpatient Medications   Medication Sig Dispense Refill    acetaminophen (TYLENOL) 500 MG tablet Take 2 tablets (1,000 mg) by mouth every 6 hours. 60 tablet 0    ketoconazole (NIZORAL) 2 % external shampoo Apply  "topically daily as needed for itching or irritation 100 mL 2    oxyCODONE (ROXICODONE) 5 MG tablet Take 1-2 tablets (5-10 mg) by mouth every 6 hours as needed for severe pain. (Patient not taking: Reported on 5/13/2025) 16 tablet 0       Allergies:  Allergies   Allergen Reactions    Codeine Swelling and Itching     PN: LW Reaction: EDEMA, GENERALIZED   Tolerated hydromorphone 5/1/24    Penicillins Other (See Comments)     Swelling, burning feeling in hands and feet. , PN: LW Reaction: EDEMA, GENERALIZED       Family history:  Family History   Problem Relation Age of Onset    Colon Polyps Mother     Alcoholism Father     Diabetes Father     Colon Cancer No family hx of     Anesthesia Reaction No family hx of     Deep Vein Thrombosis (DVT) No family hx of     Clotting Disorder No family hx of        Social history:  Social History     Tobacco Use    Smoking status: Every Day     Current packs/day: 1.00     Average packs/day: 1 pack/day for 30.8 years (30.8 ttl pk-yrs)     Types: Cigarettes     Start date: 7/9/1994    Smokeless tobacco: Never    Tobacco comments:     4/22/25 working on quitting, down to 3 cigarettes daily   Substance Use Topics    Alcohol use: Not Currently     Alcohol/week: 15.0 standard drinks of alcohol     Types: 15 Glasses of wine per week     Comment: Last alcohol months ago as of 4/22/25     Marital status: .  Occupation: n/a.    Nursing Notes:   Linda Diaz  5/13/2025 10:19 AM  Signed  Chief Complaint   Patient presents with    Post-op Visit       Vitals:    05/13/25 1005   BP: (!) 143/88   BP Location: Right arm   Patient Position: Sitting   Cuff Size: Adult Large   Pulse: 98   SpO2: 97%   Weight: 219 lb   Height: 5' 7\"       Body mass index is 34.3 kg/m .    Linda Diaz CMA       20 minutes spent on the date of the encounter doing chart review, history and exam, documentation and further activities as noted above.   This is a postop visit.    Adelina Johnson PA-C  Colon and Rectal " Surgery  [facility]    This note was created using speech recognition software and may contain unintended word substitutions.

## 2025-05-26 NOTE — PROGRESS NOTES
Colon and Rectal Surgery Clinic Note    RE: Bella Quintero.  : 1975.  IVETH: 6/3/2025.    Reason for visit: post op.    HPI: Bella Quintero is a 48 year old male who presents today for a post op. He has a past medical history of diverticulitis, alcohol (3 bottles of wine per week) and tobacco use (current smoker). He was admitted on 2024 and 2024 for acute complicated diverticulitis. Treated with IV antibiotics with transition to PO antibiotics. Follow up CT Abdomen/Pelvis on 8/3/2024 demonstrated continued eccentric wall thickening in the sigmoid colon, findings concerning for neoplasm and unchanged prominent pelvic and portacaval lymph nodes. He presented to the ED again on 2024 for LLQ pain. He was prescribed PO cipro/flagyl. He rescheduled his colonoscopy due to a recent diverticulitis flare.      CT Abdomen/Pelvis on 10/24/2024 demonstrated continued but slightly decreased eccentric right wall thickening and enhancement of the sigmoid colon with adjacent pericolonic inflammatory changes, favoring acute on chronic diverticulitis versus sigmoid colonic neoplasm with superimposed inflammatory changes. CEA 3.0. Clinic flex on 10/29/2024 demonstrated a large, circumferential mass starting at 14 cm from the AV. He did complete a full preop colonoscopy which demonstrated the partially obstructing tumor. No other massess or polyps. He is s/p open proctectomy with DLI on 2024. Surgical Pathology demonstrated moderately differentiated adenocarcinoma, + PNI, +TN, pT3N0. He was readmitted on 2024 for a SBO and abdominal wall cellulitis treated with ancef.      He met with Dr Matt Boston who recommended surveillance. GGE on 3/20/2025 demonstrated no evidence of anastomotic leak or strictures. Flex sig in April showed an intact anastomosis with no recurrence.     He is now s/p ileostomy takedown on 2025.     Interval History: Doing well, tolerating diet.    Surgical Pathology  "(4/28/2025):  Final Diagnosis   SMALL BOWEL HERNIA, ILEOSTOMY SITE:  - Skin with contiguous small intestine wall showing congestion and reactive changes, consistent with ileostomy site.   - Negative for dysplasia.        Medications:  Current Outpatient Medications   Medication Sig Dispense Refill    ketoconazole (NIZORAL) 2 % external shampoo Apply topically daily as needed for itching or irritation 100 mL 2    acetaminophen (TYLENOL) 500 MG tablet Take 2 tablets (1,000 mg) by mouth every 6 hours. 60 tablet 0    oxyCODONE (ROXICODONE) 5 MG tablet Take 1-2 tablets (5-10 mg) by mouth every 6 hours as needed for severe pain. (Patient not taking: Reported on 5/13/2025) 16 tablet 0       Allergies:  Allergies   Allergen Reactions    Codeine Swelling and Itching     PN: LW Reaction: EDEMA, GENERALIZED   Tolerated hydromorphone 5/1/24    Penicillins Other (See Comments)     Swelling, burning feeling in hands and feet. , PN: LW Reaction: EDEMA, GENERALIZED       Social history:   Social History     Tobacco Use    Smoking status: Every Day     Current packs/day: 1.00     Average packs/day: 1 pack/day for 30.9 years (30.9 ttl pk-yrs)     Types: Cigarettes     Start date: 7/9/1994    Smokeless tobacco: Never    Tobacco comments:     4/22/25 working on quitting, down to 3 cigarettes daily   Substance Use Topics    Alcohol use: Not Currently     Alcohol/week: 15.0 standard drinks of alcohol     Types: 15 Glasses of wine per week     Comment: Last alcohol months ago as of 4/22/25     Marital status: .    ROS:  A complete review of systems was performed with the patient and all systems negative except as per HPI.    Physical Examination:    /82 (BP Location: Left arm, Patient Position: Sitting, Cuff Size: Adult Large)   Pulse 65   Ht 1.702 m (5' 7\")   Wt 101.7 kg (224 lb 3.2 oz)   SpO2 97%   BMI 35.11 kg/m    General: Well hydrated. No acute distress.  Abdomen: Soft, NT, well healed incisions. No inguinal " adenopathy palpated.      ASSESSMENT     This is a 49 year old M with a large rectosigmoid adenocarcinoma s/p open LAR with DLI on 11/21/2024 now s/p ileostomy takedown on 4/28/2025. He is progressing well.      All pertinent labs and imaging were personally reviewed by me.     PLAN  - Next colonoscopy due in November  - Continue cares with Dr. Boston in medical/oncology  - RTC as needed  - Healthy life style and weight loss were encouraged     30 minutes spent on the date of the encounter doing chart review, history and exam, imaging review, documentation and further activities as noted above.      Frandy Kauffman MD, FACS, FASCRS, FSSO    Division of Colon and Rectal Surgery  [facility]    Referring Provider:  No referring provider defined for this encounter.     Primary Care Provider:  Nyla Pastor

## 2025-06-03 ENCOUNTER — OFFICE VISIT (OUTPATIENT)
Dept: SURGERY | Facility: CLINIC | Age: 50
End: 2025-06-03
Payer: COMMERCIAL

## 2025-06-03 VITALS
SYSTOLIC BLOOD PRESSURE: 129 MMHG | OXYGEN SATURATION: 97 % | BODY MASS INDEX: 35.19 KG/M2 | HEIGHT: 67 IN | DIASTOLIC BLOOD PRESSURE: 82 MMHG | WEIGHT: 224.2 LBS | HEART RATE: 65 BPM

## 2025-06-03 DIAGNOSIS — Z98.890 HX OF ILEOSTOMY: Primary | ICD-10-CM

## 2025-06-03 DIAGNOSIS — E66.3 OVERWEIGHT (BMI 25.0-29.9): ICD-10-CM

## 2025-06-03 DIAGNOSIS — Z85.048 HISTORY OF RECTAL CANCER: ICD-10-CM

## 2025-06-03 PROCEDURE — 99024 POSTOP FOLLOW-UP VISIT: CPT | Performed by: SURGERY

## 2025-06-03 PROCEDURE — 3079F DIAST BP 80-89 MM HG: CPT | Performed by: SURGERY

## 2025-06-03 PROCEDURE — 1126F AMNT PAIN NOTED NONE PRSNT: CPT | Performed by: SURGERY

## 2025-06-03 PROCEDURE — 3074F SYST BP LT 130 MM HG: CPT | Performed by: SURGERY

## 2025-06-03 ASSESSMENT — PAIN SCALES - GENERAL: PAINLEVEL_OUTOF10: NO PAIN (0)

## 2025-06-03 NOTE — NURSING NOTE
"Chief Complaint   Patient presents with    Post-op Visit       Vitals:    06/03/25 1312   BP: 129/82   BP Location: Left arm   Patient Position: Sitting   Cuff Size: Adult Large   Pulse: 65   SpO2: 97%   Weight: 224 lb 3.2 oz   Height: 5' 7\"       Body mass index is 35.11 kg/m .    Polina Jimenez, EMT  "

## 2025-06-03 NOTE — LETTER
6/3/2025       RE: Bella Quintero  [address]  [facility] [address]     Dear Colleague,    Thank you for referring your patient, Bella Quintero, to the [facility] COLON AND RECTAL SURGERY CLINIC [place] [facility]. Please see a copy of my visit note below.    Colon and Rectal Surgery Clinic Note    RE: Bella Quintero.  : 1975.  IVETH: 6/3/2025.    Reason for visit: post op.    HPI: Bella Quintero is a 48 year old male who presents today for a post op. He has a past medical history of diverticulitis, alcohol (3 bottles of wine per week) and tobacco use (current smoker). He was admitted on 2024 and 2024 for acute complicated diverticulitis. Treated with IV antibiotics with transition to PO antibiotics. Follow up CT Abdomen/Pelvis on 8/3/2024 demonstrated continued eccentric wall thickening in the sigmoid colon, findings concerning for neoplasm and unchanged prominent pelvic and portacaval lymph nodes. He presented to the ED again on 2024 for LLQ pain. He was prescribed PO cipro/flagyl. He rescheduled his colonoscopy due to a recent diverticulitis flare.      CT Abdomen/Pelvis on 10/24/2024 demonstrated continued but slightly decreased eccentric right wall thickening and enhancement of the sigmoid colon with adjacent pericolonic inflammatory changes, favoring acute on chronic diverticulitis versus sigmoid colonic neoplasm with superimposed inflammatory changes. CEA 3.0. Clinic flex on 10/29/2024 demonstrated a large, circumferential mass starting at 14 cm from the AV. He did complete a full preop colonoscopy which demonstrated the partially obstructing tumor. No other massess or polyps. He is s/p open proctectomy with DLI on 2024. Surgical Pathology demonstrated moderately differentiated adenocarcinoma, + PNI, +TN, pT3N0. He was readmitted on 2024 for a SBO and abdominal wall cellulitis treated with  ancef.      He met with Dr Matt Boston who recommended surveillance. GGE on 3/20/2025 demonstrated no evidence of anastomotic leak or strictures. Flex sig in April showed an intact anastomosis with no recurrence.     He is now s/p ileostomy takedown on 4/28/2025.     Interval History: Doing well, tolerating diet.    Surgical Pathology (4/28/2025):  Final Diagnosis   SMALL BOWEL HERNIA, ILEOSTOMY SITE:  - Skin with contiguous small intestine wall showing congestion and reactive changes, consistent with ileostomy site.   - Negative for dysplasia.        Medications:  Current Outpatient Medications   Medication Sig Dispense Refill     ketoconazole (NIZORAL) 2 % external shampoo Apply topically daily as needed for itching or irritation 100 mL 2     acetaminophen (TYLENOL) 500 MG tablet Take 2 tablets (1,000 mg) by mouth every 6 hours. 60 tablet 0     oxyCODONE (ROXICODONE) 5 MG tablet Take 1-2 tablets (5-10 mg) by mouth every 6 hours as needed for severe pain. (Patient not taking: Reported on 5/13/2025) 16 tablet 0       Allergies:  Allergies   Allergen Reactions     Codeine Swelling and Itching     PN: LW Reaction: EDEMA, GENERALIZED   Tolerated hydromorphone 5/1/24     Penicillins Other (See Comments)     Swelling, burning feeling in hands and feet. , PN: LW Reaction: EDEMA, GENERALIZED       Social history:   Social History     Tobacco Use     Smoking status: Every Day     Current packs/day: 1.00     Average packs/day: 1 pack/day for 30.9 years (30.9 ttl pk-yrs)     Types: Cigarettes     Start date: 7/9/1994     Smokeless tobacco: Never     Tobacco comments:     4/22/25 working on quitting, down to 3 cigarettes daily   Substance Use Topics     Alcohol use: Not Currently     Alcohol/week: 15.0 standard drinks of alcohol     Types: 15 Glasses of wine per week     Comment: Last alcohol months ago as of 4/22/25     Marital status: .    ROS:  A complete review of systems was performed with the patient and all  "systems negative except as per HPI.    Physical Examination:    /82 (BP Location: Left arm, Patient Position: Sitting, Cuff Size: Adult Large)   Pulse 65   Ht 1.702 m (5' 7\")   Wt 101.7 kg (224 lb 3.2 oz)   SpO2 97%   BMI 35.11 kg/m    General: Well hydrated. No acute distress.  Abdomen: Soft, NT, well healed incisions. No inguinal adenopathy palpated.      ASSESSMENT     This is a 49 year old M with a large rectosigmoid adenocarcinoma s/p open LAR with DLI on 11/21/2024 now s/p ileostomy takedown on 4/28/2025. He is progressing well.      All pertinent labs and imaging were personally reviewed by me.     PLAN  - Next colonoscopy due in November  - Continue cares with Dr. Boston in medical/oncology  - RTC as needed  - Healthy life style and weight loss were encouraged     30 minutes spent on the date of the encounter doing chart review, history and exam, imaging review, documentation and further activities as noted above.      Frandy Kauffman MD, FACS, FASCRS, FSSO    Division of Colon and Rectal Surgery  [facility]    Referring Provider:  No referring provider defined for this encounter.     Primary Care Provider:  Nyla Pastor      Again, thank you for allowing me to participate in the care of your patient.      Sincerely,    Frandy Kauffman MD    "

## 2025-07-22 ENCOUNTER — ANCILLARY PROCEDURE (OUTPATIENT)
Dept: CT IMAGING | Facility: CLINIC | Age: 50
End: 2025-07-22
Attending: STUDENT IN AN ORGANIZED HEALTH CARE EDUCATION/TRAINING PROGRAM
Payer: COMMERCIAL

## 2025-07-22 ENCOUNTER — LAB (OUTPATIENT)
Dept: LAB | Facility: CLINIC | Age: 50
End: 2025-07-22
Attending: STUDENT IN AN ORGANIZED HEALTH CARE EDUCATION/TRAINING PROGRAM
Payer: COMMERCIAL

## 2025-07-22 DIAGNOSIS — C19 MALIGNANT NEOPLASM OF RECTOSIGMOID JUNCTION (H): ICD-10-CM

## 2025-07-22 LAB
ALBUMIN SERPL BCG-MCNC: 4.1 G/DL (ref 3.5–5.2)
ALP SERPL-CCNC: 100 U/L (ref 40–150)
ALT SERPL W P-5'-P-CCNC: 8 U/L (ref 0–70)
ANION GAP SERPL CALCULATED.3IONS-SCNC: 11 MMOL/L (ref 7–15)
AST SERPL W P-5'-P-CCNC: 14 U/L (ref 0–45)
BASOPHILS # BLD AUTO: 0.1 10E3/UL (ref 0–0.2)
BASOPHILS NFR BLD AUTO: 2 %
BILIRUB SERPL-MCNC: 0.4 MG/DL
BUN SERPL-MCNC: 12.1 MG/DL (ref 6–20)
CALCIUM SERPL-MCNC: 9 MG/DL (ref 8.8–10.4)
CEA SERPL-MCNC: 1.4 NG/ML
CHLORIDE SERPL-SCNC: 103 MMOL/L (ref 98–107)
CREAT SERPL-MCNC: 0.85 MG/DL (ref 0.67–1.17)
EGFRCR SERPLBLD CKD-EPI 2021: >90 ML/MIN/1.73M2
EOSINOPHIL # BLD AUTO: 0.3 10E3/UL (ref 0–0.7)
EOSINOPHIL NFR BLD AUTO: 4 %
ERYTHROCYTE [DISTWIDTH] IN BLOOD BY AUTOMATED COUNT: 13.3 % (ref 10–15)
GLUCOSE SERPL-MCNC: 102 MG/DL (ref 70–99)
HCO3 SERPL-SCNC: 24 MMOL/L (ref 22–29)
HCT VFR BLD AUTO: 47.7 % (ref 40–53)
HGB BLD-MCNC: 15.4 G/DL (ref 13.3–17.7)
IMM GRANULOCYTES # BLD: 0 10E3/UL
IMM GRANULOCYTES NFR BLD: 0 %
LYMPHOCYTES # BLD AUTO: 2 10E3/UL (ref 0.8–5.3)
LYMPHOCYTES NFR BLD AUTO: 24 %
MCH RBC QN AUTO: 29.3 PG (ref 26.5–33)
MCHC RBC AUTO-ENTMCNC: 32.3 G/DL (ref 31.5–36.5)
MCV RBC AUTO: 91 FL (ref 78–100)
MONOCYTES # BLD AUTO: 0.6 10E3/UL (ref 0–1.3)
MONOCYTES NFR BLD AUTO: 7 %
NEUTROPHILS # BLD AUTO: 5.3 10E3/UL (ref 1.6–8.3)
NEUTROPHILS NFR BLD AUTO: 63 %
NRBC # BLD AUTO: 0 10E3/UL
NRBC BLD AUTO-RTO: 0 /100
PLATELET # BLD AUTO: 310 10E3/UL (ref 150–450)
POTASSIUM SERPL-SCNC: 4.8 MMOL/L (ref 3.4–5.3)
PROT SERPL-MCNC: 7.2 G/DL (ref 6.4–8.3)
RBC # BLD AUTO: 5.25 10E6/UL (ref 4.4–5.9)
SODIUM SERPL-SCNC: 138 MMOL/L (ref 135–145)
WBC # BLD AUTO: 8.4 10E3/UL (ref 4–11)

## 2025-07-22 PROCEDURE — 74177 CT ABD & PELVIS W/CONTRAST: CPT | Performed by: RADIOLOGY

## 2025-07-22 PROCEDURE — 80053 COMPREHEN METABOLIC PANEL: CPT | Performed by: PATHOLOGY

## 2025-07-22 PROCEDURE — 71260 CT THORAX DX C+: CPT | Performed by: RADIOLOGY

## 2025-07-22 PROCEDURE — 99000 SPECIMEN HANDLING OFFICE-LAB: CPT | Performed by: PATHOLOGY

## 2025-07-22 PROCEDURE — 36415 COLL VENOUS BLD VENIPUNCTURE: CPT | Performed by: PATHOLOGY

## 2025-07-22 PROCEDURE — 82378 CARCINOEMBRYONIC ANTIGEN: CPT | Performed by: STUDENT IN AN ORGANIZED HEALTH CARE EDUCATION/TRAINING PROGRAM

## 2025-07-22 PROCEDURE — 85025 COMPLETE CBC W/AUTO DIFF WBC: CPT | Performed by: PATHOLOGY

## 2025-07-22 RX ORDER — IOPAMIDOL 755 MG/ML
110 INJECTION, SOLUTION INTRAVASCULAR ONCE
Status: COMPLETED | OUTPATIENT
Start: 2025-07-22 | End: 2025-07-22

## 2025-07-22 RX ADMIN — IOPAMIDOL 110 ML: 755 INJECTION, SOLUTION INTRAVASCULAR at 13:15

## 2025-07-22 NOTE — DISCHARGE INSTRUCTIONS

## 2025-08-10 ENCOUNTER — HEALTH MAINTENANCE LETTER (OUTPATIENT)
Age: 50
End: 2025-08-10

## 2025-08-11 ENCOUNTER — PATIENT OUTREACH (OUTPATIENT)
Dept: GASTROENTEROLOGY | Facility: CLINIC | Age: 50
End: 2025-08-11
Payer: COMMERCIAL

## 2025-08-11 DIAGNOSIS — Z12.11 SPECIAL SCREENING FOR MALIGNANT NEOPLASM OF COLON: Primary | ICD-10-CM

## 2025-09-01 DIAGNOSIS — Z12.11 ENCOUNTER FOR SCREENING COLONOSCOPY: Primary | ICD-10-CM

## (undated) DEVICE — SU PDS II 0 CTX 36" Z370T

## (undated) DEVICE — ENDO TROCAR CONMED AIRSEAL BLADELESS 05X120MM IAS5-120LP

## (undated) DEVICE — SU MONOCRYL 3-0 SH 27" UND Y416H

## (undated) DEVICE — VESSEL LOOPS YELLOW MAXI 31145694

## (undated) DEVICE — KIT PATIENT POSITIONING PIGAZZI LATEX FREE 40580

## (undated) DEVICE — ENDO POUCH UNIVERSAL RETRIEVAL SYSTEM INZII 12/15MM CD004

## (undated) DEVICE — DRAIN JACKSON PRATT RESERVOIR 100ML SU130-1305

## (undated) DEVICE — ESU LIGASURE DISSECTOR EXACT LF2019

## (undated) DEVICE — SYR 10ML FINGER CONTROL W/O NDL 309695

## (undated) DEVICE — SUCTION TIP YANKAUER STR K87

## (undated) DEVICE — STPL LINEAR CUT 30X3.5MM TX30B

## (undated) DEVICE — NDL ANGIOCATH 14GA 1.25" 4048

## (undated) DEVICE — DRAIN JACKSON PRATT ROUND SIL 19FR W/TROCAR LF JP-2232

## (undated) DEVICE — DAVINCI XI MONOPOLAR SCISSORS HOT SHEARS 8MM 470179

## (undated) DEVICE — STPL ECHELON CONTOUR CUTTER CVD 40MM GREEN GCS40G

## (undated) DEVICE — ESU ELEC BLADE 6" COATED E1450-6

## (undated) DEVICE — PACK DAVINCI UROL

## (undated) DEVICE — PACK GOWN 3/PK DISP XL SBA32GPFCB

## (undated) DEVICE — GLOVE BIOGEL PI ULTRATOUCH SZ 7.0 41170

## (undated) DEVICE — JELLY LUBRICATING SURGILUBE 2OZ TUBE

## (undated) DEVICE — SU ETHILON 3-0 PS-1 18" 1663H

## (undated) DEVICE — SU MONOCRYL 4-0 PS-2 27" UND Y426H

## (undated) DEVICE — DRSG TELFA ISLAND 4X14" 7544

## (undated) DEVICE — DAVINCI XI HANDPIECE ESU VESSEL SEALER 8MM EXT 480422

## (undated) DEVICE — PAD CHUX UNDERPAD 23X24" 7136

## (undated) DEVICE — STPL 100 X 3.8MM GIA10038S

## (undated) DEVICE — DAVINCI XI GRASPER PROBE CARDIAC ENDOWRIST 470215

## (undated) DEVICE — SU SILK 0 TIE 6X30" A306H

## (undated) DEVICE — SUCTION MANIFOLD NEPTUNE 2 SYS 4 PORT 0702-020-000

## (undated) DEVICE — DRAPE U SPLIT 74X120" 29440

## (undated) DEVICE — SU PDS II 0 CT-1 27" Z340H

## (undated) DEVICE — TUBING SUCTION 10'X3/16" N510

## (undated) DEVICE — SU WND CLOSURE VLOC 90 ABS 2-0 VIOLET 6" GS-22 VLOCM2105

## (undated) DEVICE — DAVINCI HOT SHEARS TIP COVER  400180

## (undated) DEVICE — KIT ENDO FIRST STEP DISINFECTANT 200ML W/POUCH EP-4

## (undated) DEVICE — PACK THORACOTOMY UMMC LF SCV32THF13

## (undated) DEVICE — WIPES FOLEY CARE SURESTEP PROVON DFC100

## (undated) DEVICE — SU SILK 2-0 TIE 12X30" A305H

## (undated) DEVICE — SOL WATER IRRIG 3000ML BAG 2B7117

## (undated) DEVICE — LINEN TOWEL PACK X6 WHITE 5487

## (undated) DEVICE — DRSG GAUZE 4X4" TRAY 6939

## (undated) DEVICE — STPL RELOAD 100 X 3.8MM GIA10038L

## (undated) DEVICE — SPONGE LAP 18X18" X8435

## (undated) DEVICE — Device

## (undated) DEVICE — DAVINCI XI REDUCER 8-12MM 470381

## (undated) DEVICE — SOL WATER IRRIG 1000ML BOTTLE 2F7114

## (undated) DEVICE — GLOVE BIOGEL PI MICRO INDICATOR UNDERGLOVE SZ 7.5 48975

## (undated) DEVICE — DRSG PRIMAPORE 02X3" 7133

## (undated) DEVICE — DAVINCI XI DRAPE COLUMN 470341

## (undated) DEVICE — STPL RELOAD ECHELON CONTOUR CVD 40MM GREEN GCR40G

## (undated) DEVICE — STPL CIRCULAR 29MM CVD CDH29P

## (undated) DEVICE — STPL LINEAR CUT 75MM TLC75

## (undated) DEVICE — SU VICRYL 3-0 SH 8X18" UND J864D

## (undated) DEVICE — SUCTION TIP POOLE K770

## (undated) DEVICE — DRAPE SHEET REV FOLD 3/4 9349

## (undated) DEVICE — GOWN XLG DISP 9545

## (undated) DEVICE — TUBING FILTER TRI-LUMEN AIRSEAL ASC-EVAC1

## (undated) DEVICE — DRAPE POUCH IRR 1016

## (undated) DEVICE — ANTIFOG SOLUTION SEE SHARP 150M TROCAR SWABS 30978 (COI)

## (undated) DEVICE — DAVINCI XI ESU BIPOLAR 3MM ENDOWRIST FENESTRATED EXT 471205

## (undated) DEVICE — GLOVE BIOGEL PI MICRO SZ 7.5 48575

## (undated) DEVICE — CATH URETERAL OPEN END 05FR 70CM 020015

## (undated) DEVICE — SU DERMABOND ADVANCED .7ML DNX12

## (undated) DEVICE — DRAPE POUCH INSTRUMENT 3 POCKET 1018L

## (undated) DEVICE — SU PROLENE 3-0 SHDA 36" 8522H

## (undated) DEVICE — ESU ENDO SCISSORS 5MM CVD 5DCS

## (undated) DEVICE — BLADE CLIPPER DISP 4406

## (undated) DEVICE — DRAPE IOBAN INCISE 23X17" 6650EZ

## (undated) DEVICE — LINEN TOWEL PACK X30 5481

## (undated) DEVICE — SU PDS II 2-0 SH 27" Z317H

## (undated) DEVICE — LINEN TOWEL PACK X5 5464

## (undated) DEVICE — PROTECTOR ARM ONE-STEP TRENDELENBURG 40418 (COI)

## (undated) DEVICE — ESU LIGASURE IMPACT OPEN SEALER/DVDR CVD LG JAW LF4418

## (undated) DEVICE — GLOVE BIOGEL PI MICRO SZ 8.0 48580

## (undated) DEVICE — DAVINCI XI DRAPE ARM 470015

## (undated) DEVICE — ENDO ACCESS PLATFORM GELPOINT MINI CNGL3

## (undated) DEVICE — CATH TRAY FOLEY SURESTEP 16FR W/URNE MTR STLK LATEX A303316A

## (undated) DEVICE — SYR 10ML LL W/O NDL 302995

## (undated) DEVICE — PREP CHLORAPREP 26ML TINTED HI-LITE ORANGE 930815

## (undated) DEVICE — DAVINCI XI SEAL UNIVERSAL 5-12MM 470500

## (undated) DEVICE — BNDG ABDOMINAL BINDER 9X62-84" 79-89210

## (undated) DEVICE — KIT NEW IMAGE COLOSTOMY/ILEOSTOMY 2 3/4" LF 19354

## (undated) DEVICE — DAVINCI XI GRASPER PROGRASP 8MM EXT 471093

## (undated) DEVICE — ENDO CAP AND TUBING STERILE FOR ENDOGATOR  100130

## (undated) DEVICE — DRAPE MAYO STAND 23X54 8337

## (undated) DEVICE — DRSG TEGADERM 4X4 3/4" 1626W

## (undated) DEVICE — CLIP ENDO HEMO-LOC PURPLE LG 544240

## (undated) DEVICE — SU VICRYL+ 3-0 27IN SH UND VCP416H

## (undated) DEVICE — KIT CONNECTOR FOR OLYMPUS ENDOSCOPES DEFENDO 100310

## (undated) DEVICE — PACK CYSTO UMMC CUSTOM

## (undated) DEVICE — SU VICRYL 2-0 SH 27" UND J417H

## (undated) DEVICE — SUCTION TIP YANKAUER W/O VENT K86

## (undated) DEVICE — SU PDS II 3-0 SH 27" Z316H

## (undated) DEVICE — PACKING NUGAUZE 1/2" PLAIN 7632

## (undated) DEVICE — STPL RELOAD LINEAR 30X3.5MM XR30B

## (undated) DEVICE — PREP POVIDONE-IODINE 10% SOLUTION 4OZ BOTTLE MDS093944

## (undated) DEVICE — GUIDEWIRE SENSOR DUAL FLEX STR 0.035"X150CM M0066703080

## (undated) DEVICE — TUBING SUCTION MEDI-VAC SOFT 3/16"X20' N520A

## (undated) RX ORDER — FENTANYL CITRATE-0.9 % NACL/PF 10 MCG/ML
PLASTIC BAG, INJECTION (ML) INTRAVENOUS
Status: DISPENSED
Start: 2024-11-21

## (undated) RX ORDER — PROPOFOL 10 MG/ML
INJECTION, EMULSION INTRAVENOUS
Status: DISPENSED
Start: 2024-11-21

## (undated) RX ORDER — CEFAZOLIN SODIUM 1 G/3ML
INJECTION, POWDER, FOR SOLUTION INTRAMUSCULAR; INTRAVENOUS
Status: DISPENSED
Start: 2024-11-21

## (undated) RX ORDER — LABETALOL HYDROCHLORIDE 5 MG/ML
INJECTION, SOLUTION INTRAVENOUS
Status: DISPENSED
Start: 2024-11-21

## (undated) RX ORDER — HEPARIN SODIUM 5000 [USP'U]/.5ML
INJECTION, SOLUTION INTRAVENOUS; SUBCUTANEOUS
Status: DISPENSED
Start: 2025-04-28

## (undated) RX ORDER — HYDROMORPHONE HYDROCHLORIDE 1 MG/ML
INJECTION, SOLUTION INTRAMUSCULAR; INTRAVENOUS; SUBCUTANEOUS
Status: DISPENSED
Start: 2024-11-21

## (undated) RX ORDER — HYDROMORPHONE HYDROCHLORIDE 1 MG/ML
INJECTION, SOLUTION INTRAMUSCULAR; INTRAVENOUS; SUBCUTANEOUS
Status: DISPENSED
Start: 2025-04-28

## (undated) RX ORDER — CALCIUM CHLORIDE 100 MG/ML
INJECTION INTRAVENOUS; INTRAVENTRICULAR
Status: DISPENSED
Start: 2024-11-21

## (undated) RX ORDER — BUPIVACAINE HYDROCHLORIDE 2.5 MG/ML
INJECTION, SOLUTION EPIDURAL; INFILTRATION; INTRACAUDAL; PERINEURAL
Status: DISPENSED
Start: 2025-04-28

## (undated) RX ORDER — ACETAMINOPHEN 325 MG/1
TABLET ORAL
Status: DISPENSED
Start: 2024-11-21

## (undated) RX ORDER — CLINDAMYCIN PHOSPHATE 900 MG/50ML
INJECTION, SOLUTION INTRAVENOUS
Status: DISPENSED
Start: 2024-11-21

## (undated) RX ORDER — AMOXICILLIN 250 MG
CAPSULE ORAL
Status: DISPENSED
Start: 2024-11-21

## (undated) RX ORDER — METOPROLOL TARTRATE 1 MG/ML
INJECTION, SOLUTION INTRAVENOUS
Status: DISPENSED
Start: 2024-11-21

## (undated) RX ORDER — LIDOCAINE HYDROCHLORIDE AND EPINEPHRINE 10; 10 MG/ML; UG/ML
INJECTION, SOLUTION INFILTRATION; PERINEURAL
Status: DISPENSED
Start: 2025-04-28

## (undated) RX ORDER — FENTANYL CITRATE 50 UG/ML
INJECTION, SOLUTION INTRAMUSCULAR; INTRAVENOUS
Status: DISPENSED
Start: 2025-04-28

## (undated) RX ORDER — GLYCOPYRROLATE 0.2 MG/ML
INJECTION, SOLUTION INTRAMUSCULAR; INTRAVENOUS
Status: DISPENSED
Start: 2024-11-21

## (undated) RX ORDER — FENTANYL CITRATE 50 UG/ML
INJECTION, SOLUTION INTRAMUSCULAR; INTRAVENOUS
Status: DISPENSED
Start: 2024-11-21

## (undated) RX ORDER — ACETAMINOPHEN 500 MG
TABLET ORAL
Status: DISPENSED
Start: 2024-11-21

## (undated) RX ORDER — HEPARIN SODIUM 5000 [USP'U]/.5ML
INJECTION, SOLUTION INTRAVENOUS; SUBCUTANEOUS
Status: DISPENSED
Start: 2024-11-21

## (undated) RX ORDER — ACETAMINOPHEN 325 MG/1
TABLET ORAL
Status: DISPENSED
Start: 2025-04-28

## (undated) RX ORDER — ESMOLOL HYDROCHLORIDE 10 MG/ML
INJECTION INTRAVENOUS
Status: DISPENSED
Start: 2025-04-28

## (undated) RX ORDER — OXYCODONE HYDROCHLORIDE 5 MG/1
TABLET ORAL
Status: DISPENSED
Start: 2024-11-22

## (undated) RX ORDER — ACETAMINOPHEN 500 MG
TABLET ORAL
Status: DISPENSED
Start: 2024-11-22

## (undated) RX ORDER — ALBUTEROL SULFATE 90 UG/1
INHALANT RESPIRATORY (INHALATION)
Status: DISPENSED
Start: 2024-11-21

## (undated) RX ORDER — ONDANSETRON 2 MG/ML
INJECTION INTRAMUSCULAR; INTRAVENOUS
Status: DISPENSED
Start: 2025-04-28

## (undated) RX ORDER — SODIUM CHLORIDE, SODIUM LACTATE, POTASSIUM CHLORIDE, CALCIUM CHLORIDE 600; 310; 30; 20 MG/100ML; MG/100ML; MG/100ML; MG/100ML
INJECTION, SOLUTION INTRAVENOUS
Status: DISPENSED
Start: 2024-11-21

## (undated) RX ORDER — ONDANSETRON 2 MG/ML
INJECTION INTRAMUSCULAR; INTRAVENOUS
Status: DISPENSED
Start: 2024-11-21

## (undated) RX ORDER — DEXAMETHASONE SODIUM PHOSPHATE 4 MG/ML
INJECTION, SOLUTION INTRA-ARTICULAR; INTRALESIONAL; INTRAMUSCULAR; INTRAVENOUS; SOFT TISSUE
Status: DISPENSED
Start: 2024-11-21

## (undated) RX ORDER — EPHEDRINE SULFATE 50 MG/ML
INJECTION, SOLUTION INTRAMUSCULAR; INTRAVENOUS; SUBCUTANEOUS
Status: DISPENSED
Start: 2024-11-21

## (undated) RX ORDER — METRONIDAZOLE 500 MG/100ML
INJECTION, SOLUTION INTRAVENOUS
Status: DISPENSED
Start: 2025-04-28

## (undated) RX ORDER — ESMOLOL HYDROCHLORIDE 10 MG/ML
INJECTION INTRAVENOUS
Status: DISPENSED
Start: 2024-11-21

## (undated) RX ORDER — PHENYLEPHRINE HCL IN 0.9% NACL 50MG/250ML
PLASTIC BAG, INJECTION (ML) INTRAVENOUS
Status: DISPENSED
Start: 2024-11-21